# Patient Record
Sex: MALE | Race: WHITE | Employment: OTHER | RURAL
[De-identification: names, ages, dates, MRNs, and addresses within clinical notes are randomized per-mention and may not be internally consistent; named-entity substitution may affect disease eponyms.]

---

## 2017-08-24 ENCOUNTER — OFFICE VISIT (OUTPATIENT)
Dept: FAMILY MEDICINE CLINIC | Age: 82
End: 2017-08-24

## 2017-08-24 VITALS
TEMPERATURE: 96.1 F | SYSTOLIC BLOOD PRESSURE: 155 MMHG | HEART RATE: 54 BPM | BODY MASS INDEX: 27.49 KG/M2 | WEIGHT: 192 LBS | DIASTOLIC BLOOD PRESSURE: 64 MMHG | RESPIRATION RATE: 16 BRPM | HEIGHT: 70 IN | OXYGEN SATURATION: 96 %

## 2017-08-24 DIAGNOSIS — I10 ESSENTIAL HYPERTENSION: ICD-10-CM

## 2017-08-24 DIAGNOSIS — K57.30 DIVERTICULOSIS OF LARGE INTESTINE WITHOUT HEMORRHAGE: ICD-10-CM

## 2017-08-24 DIAGNOSIS — M62.838 MUSCLE SPASM: Primary | ICD-10-CM

## 2017-08-24 DIAGNOSIS — C61 PROSTATE CANCER (HCC): ICD-10-CM

## 2017-08-24 DIAGNOSIS — Z86.79 HISTORY OF ATRIAL FIBRILLATION: ICD-10-CM

## 2017-08-24 DIAGNOSIS — Z96.651 HISTORY OF TOTAL RIGHT KNEE REPLACEMENT: ICD-10-CM

## 2017-08-24 DIAGNOSIS — K80.20 GALLSTONES: ICD-10-CM

## 2017-08-24 RX ORDER — LANOLIN ALCOHOL/MO/W.PET/CERES
400 CREAM (GRAM) TOPICAL 2 TIMES DAILY
Qty: 100 TAB | Refills: 0
Start: 2017-08-24 | End: 2019-01-03 | Stop reason: ALTCHOICE

## 2017-08-24 RX ORDER — PNV NO.95/FERROUS FUM/FOLIC AC 28MG-0.8MG
TABLET ORAL
COMMUNITY
End: 2019-01-03 | Stop reason: ALTCHOICE

## 2017-08-24 NOTE — MR AVS SNAPSHOT
Visit Information Date & Time Provider Department Dept. Phone Encounter #  
 8/24/2017  1:20 PM Elise Interiano MD [facility] Primary [facility] [phone] [id number] Upcoming Health Maintenance Date Due DTaP/Tdap/Td series (1 - Tdap) 10/19/1947 ZOSTER VACCINE AGE 60> 8/19/1986 GLAUCOMA SCREENING Q2Y 10/19/1991 Pneumococcal 65+ High/Highest Risk (1 of 2 - PCV13) 10/19/1991 INFLUENZA AGE 9 TO ADULT 8/1/2017 MEDICARE YEARLY EXAM 11/2/2017 Allergies as of 8/24/2017  Review Complete On: 8/24/2017 By: Elise Interiano MD  
 No Known Allergies Current Immunizations  Never Reviewed No immunizations on file. Not reviewed this visit You Were Diagnosed With   
  
 Codes Comments Essential hypertension    -  Primary ICD-10-CM: I10 
ICD-9-CM: 401.9 Vitals BP Pulse Temp Resp Height(growth percentile) Weight(growth percentile) 155/64 (BP 1 Location: Left arm, BP Patient Position: Sitting) (!) 54 96.1 °F (35.6 °C) 16 5' 10\" (1.778 m) 192 lb (87.1 kg) SpO2 BMI Smoking Status 96% 27.55 kg/m2 Never Smoker Vitals History BMI and BSA Data Body Mass Index Body Surface Area  
 27.55 kg/m 2 2.07 m 2 Your Updated Medication List  
  
   
This list is accurate as of: 8/24/17  2:03 PM.  Always use your most recent med list.  
  
  
  
  
 CENTRUM SILVER MEN PO Take  by mouth. CITRACAL + D3 (CALCIUM PHOS) PO Take  by mouth. KRILL -80-03-50 mg Cap Generic drug:  krill-omega-3-dha-epa-lipids Take  by mouth.  
  
 metoprolol tartrate 50 mg tablet Commonly known as:  LOPRESSOR Take  by mouth two (2) times a day. Introducing [facility]! [facility] introduces [employer] patient portal. Now you can access parts of your medical record, email your doctor's office, and request medication refills online.    
 
1. In your internet browser, go to [email or web address] 2. Click on the First Time User? Click Here link in the Sign In box. You will see the New Member Sign Up page. 3. Enter your madKast Access Code exactly as it appears below. You will not need to use this code after youve completed the sign-up process. If you do not sign up before the expiration date, you must request a new code. · madKast Access Code: [email or web address] Expires: 11/22/2017  2:03 PM 
 
4. Enter the last four digits of your Social Security Number (xxxx) and Date of Birth (mm/dd/yyyy) as indicated and click Submit. You will be taken to the next sign-up page. 5. Create a MEPS Real-Timet ID. This will be your madKast login ID and cannot be changed, so think of one that is secure and easy to remember. 6. Create a madKast password. You can change your password at any time. 7. Enter your Password Reset Question and Answer. This can be used at a later time if you forget your password. 8. Enter your e-mail address. You will receive e-mail notification when new information is available in [address] 9. Click Sign Up. You can now view and download portions of your medical record. 10. Click the Download Summary menu link to download a portable copy of your medical information. If you have questions, please visit the Frequently Asked Questions section of the madKast website. Remember, madKast is NOT to be used for urgent needs. For medical emergencies, dial 911. Now available from your iPhone and Android! Please provide this summary of care documentation to your next provider. Your primary care clinician is listed as Florentin Archer. If you have any questions after today's visit, please call [phone].

## 2017-08-24 NOTE — PROGRESS NOTES
Jerry Brito is a 80 y.o. male who presents with the following:  Chief Complaint   Patient presents with    Hypertension    Rib Pain     both sides    Mass     Prostate       Hypertension    The history is provided by the patient (Patient has a long history of high blood pressure which is well controlled on his metoprolol which was originally given to him for atrial fibrillation). Associated symptoms include chest pain (Chest wall). Pertinent negatives include no orthopnea, no palpitations, no malaise/fatigue, no blurred vision, no headaches, no tinnitus, no dizziness, no shortness of breath, no nausea and no vomiting. Rib Pain    The history is provided by the patient (Patient having sharp stabbing pains in the left and right side of his chest wall on certain movements). Associated symptoms include back pain (Patient has mild to moderate back pain after surgeries on both his neck and his lower back years ago). Pertinent negatives include no abdominal pain, no claudication, no cough, no diaphoresis, no dizziness, no exertional chest pressure, no fever, no headaches, no lower extremity edema, no malaise/fatigue, no nausea, no numbness, no orthopnea, no palpitations, no shortness of breath, no vomiting and no weakness. Mass   Associated symptoms include chest pain (Chest wall). Pertinent negatives include no abdominal pain, no headaches and no shortness of breath. No Known Allergies    Current Outpatient Prescriptions   Medication Sig    MULTIVIT-MIN/FA/LYCOPEN/LUTEIN (CENTRUM SILVER MEN PO) Take  by mouth.  CALCIUM PHOSPHATE TRIB/VIT D3 (CITRACAL + D3, CALCIUM PHOS, PO) Take  by mouth.  krill-omega-3-dha-epa-lipids (KRILL OIL) 127-17-27-19 mg cap Take  by mouth.  magnesium oxide (MAG-OX) 400 mg tablet Take 1 Tab by mouth two (2) times a day.  metoprolol (LOPRESSOR) 50 mg tablet Take  by mouth two (2) times a day. No current facility-administered medications for this visit.         Past Medical History:   Diagnosis Date    HTN (hypertension)     Prostate cancer ([facility] [address]        Past Surgical History:   Procedure Laterality Date    HX ORTHOPAEDIC      HX UROLOGICAL         Family History   Problem Relation Age of Onset    Stroke Father     Cancer Sister        Social History     Social History    Marital status: SINGLE     Spouse name: N/A    Number of children: N/A    Years of education: N/A     Social History Main Topics    Smoking status: Never Smoker    Smokeless tobacco: Never Used    Alcohol use No    Drug use: None    Sexual activity: Not Asked     Other Topics Concern    None     Social History Narrative       Review of Systems   Constitutional: Negative for chills, diaphoresis, fever, malaise/fatigue and weight loss. HENT: Negative for congestion, hearing loss, sore throat and tinnitus. Eyes: Negative for blurred vision, pain and discharge. Respiratory: Negative for cough, shortness of breath and wheezing. Cardiovascular: Positive for chest pain (Chest wall). Negative for palpitations, orthopnea, claudication and leg swelling. Gastrointestinal: Negative for abdominal pain, constipation, heartburn, nausea and vomiting. Genitourinary: Negative for dysuria, frequency and urgency. Musculoskeletal: Positive for back pain (Patient has mild to moderate back pain after surgeries on both his neck and his lower back years ago). Negative for falls, joint pain and myalgias. Skin: Negative for itching and rash. Neurological: Negative for dizziness, tingling, tremors, weakness, numbness and headaches. Endo/Heme/Allergies: Negative for environmental allergies and polydipsia. Psychiatric/Behavioral: Negative for depression and substance abuse. The patient is not nervous/anxious.         Visit Vitals    /64 (BP 1 Location: Left arm, BP Patient Position: Sitting)    Pulse (!) 54    Temp 96.1 °F (35.6 °C)    Resp 16    Ht 5' 10\" (1.778 m)    Wt 192 lb (87.1 kg)  SpO2 96%    BMI 27.55 kg/m2     Physical Exam   Constitutional: He is oriented to person, place, and time and well-developed, well-nourished, and in no distress. HENT:   Head: Normocephalic and atraumatic. Nose: Nose normal.   Mouth/Throat: Oropharynx is clear and moist.   Eyes: Conjunctivae and EOM are normal. Pupils are equal, round, and reactive to light. Neck: Normal range of motion. Neck supple. No JVD present. No tracheal deviation present. No thyromegaly present. Cardiovascular: Normal rate, regular rhythm, normal heart sounds and intact distal pulses. Exam reveals no gallop and no friction rub. No murmur heard. Pulmonary/Chest: Effort normal and breath sounds normal. No respiratory distress. He has no wheezes. He has no rales. He exhibits no tenderness. Patient is tender in the intercostal spaces that correlate to where he was feeling the pain. Abdominal: Soft. Bowel sounds are normal. He exhibits no distension and no mass. There is no tenderness. There is no rebound and no guarding. Musculoskeletal: Normal range of motion. He exhibits no edema or tenderness. Lymphadenopathy:     He has no cervical adenopathy. Neurological: He is alert and oriented to person, place, and time. He has normal reflexes. No cranial nerve deficit. He exhibits normal muscle tone. Gait normal. Coordination normal.   Skin: Skin is warm and dry. No rash noted. No erythema. Psychiatric: Mood, memory, affect and judgment normal.   Vitals reviewed. ICD-10-CM ICD-9-CM    1. Muscle spasm A12.875 367.62 METABOLIC PANEL, COMPREHENSIVE      MAGNESIUM      magnesium oxide (MAG-OX) 400 mg tablet   2. Essential hypertension R26 565.8 METABOLIC PANEL, COMPREHENSIVE      CBC WITH AUTOMATED DIFF      MAGNESIUM      MA COLLECTION VENOUS BLOOD,VENIPUNCTURE   3. Prostate cancer ([facility] [address] C61 185    4. Diverticulosis of large intestine without hemorrhage K57.30 562.10    5.  History of total right knee replacement Z96.651 V43.65    6. Gallstones K80.20 574.20    7. History of atrial fibrillation Z86.79 V12.59        Orders Placed This Encounter    METABOLIC PANEL, COMPREHENSIVE    CBC WITH AUTOMATED DIFF    MAGNESIUM    CA COLLECTION VENOUS BLOOD,VENIPUNCTURE    MULTIVIT-MIN/FA/LYCOPEN/LUTEIN (CENTRUM SILVER MEN PO)     Sig: Take  by mouth.  CALCIUM PHOSPHATE TRIB/VIT D3 (CITRACAL + D3, CALCIUM PHOS, PO)     Sig: Take  by mouth.  krill-omega-3-dha-epa-lipids (KRILL OIL) 341-38-89-11 mg cap     Sig: Take  by mouth.  magnesium oxide (MAG-OX) 400 mg tablet     Sig: Take 1 Tab by mouth two (2) times a day.      Dispense:  100 Tab     Refill:  0       Follow-up Disposition: Not on Anoop Angel MD

## 2017-08-25 LAB
ALBUMIN SERPL-MCNC: 4.3 G/DL (ref 3.2–4.6)
ALBUMIN/GLOB SERPL: 2 {RATIO} (ref 1.2–2.2)
ALP SERPL-CCNC: 55 IU/L (ref 39–117)
ALT SERPL-CCNC: 13 IU/L (ref 0–44)
AST SERPL-CCNC: 20 IU/L (ref 0–40)
BASOPHILS # BLD AUTO: 0 X10E3/UL (ref 0–0.2)
BASOPHILS NFR BLD AUTO: 1 %
BILIRUB SERPL-MCNC: 0.4 MG/DL (ref 0–1.2)
BUN SERPL-MCNC: 9 MG/DL (ref 10–36)
BUN/CREAT SERPL: 10 (ref 10–24)
CALCIUM SERPL-MCNC: 9.2 MG/DL (ref 8.6–10.2)
CHLORIDE SERPL-SCNC: 95 MMOL/L (ref 96–106)
CO2 SERPL-SCNC: 28 MMOL/L (ref 18–29)
CREAT SERPL-MCNC: 0.93 MG/DL (ref 0.76–1.27)
EOSINOPHIL # BLD AUTO: 0.2 X10E3/UL (ref 0–0.4)
EOSINOPHIL NFR BLD AUTO: 3 %
ERYTHROCYTE [DISTWIDTH] IN BLOOD BY AUTOMATED COUNT: 13.7 % (ref 12.3–15.4)
GLOBULIN SER CALC-MCNC: 2.1 G/DL (ref 1.5–4.5)
GLUCOSE SERPL-MCNC: 85 MG/DL (ref 65–99)
HCT VFR BLD AUTO: 37.9 % (ref 37.5–51)
HGB BLD-MCNC: 12.8 G/DL (ref 12.6–17.7)
IMM GRANULOCYTES # BLD: 0 X10E3/UL (ref 0–0.1)
IMM GRANULOCYTES NFR BLD: 0 %
LYMPHOCYTES # BLD AUTO: 2 X10E3/UL (ref 0.7–3.1)
LYMPHOCYTES NFR BLD AUTO: 39 %
MAGNESIUM SERPL-MCNC: 2.2 MG/DL (ref 1.6–2.3)
MCH RBC QN AUTO: 30.1 PG (ref 26.6–33)
MCHC RBC AUTO-ENTMCNC: 33.8 G/DL (ref 31.5–35.7)
MCV RBC AUTO: 89 FL (ref 79–97)
MONOCYTES # BLD AUTO: 0.5 X10E3/UL (ref 0.1–0.9)
MONOCYTES NFR BLD AUTO: 11 %
NEUTROPHILS # BLD AUTO: 2.4 X10E3/UL (ref 1.4–7)
NEUTROPHILS NFR BLD AUTO: 46 %
PLATELET # BLD AUTO: 188 X10E3/UL (ref 150–379)
POTASSIUM SERPL-SCNC: 4.8 MMOL/L (ref 3.5–5.2)
PROT SERPL-MCNC: 6.4 G/DL (ref 6–8.5)
RBC # BLD AUTO: 4.25 X10E6/UL (ref 4.14–5.8)
SODIUM SERPL-SCNC: 137 MMOL/L (ref 134–144)
WBC # BLD AUTO: 5.1 X10E3/UL (ref 3.4–10.8)

## 2017-08-26 LAB
PSA SERPL-MCNC: 6 NG/ML (ref 0–4)
SPECIMEN STATUS REPORT, ROLRST: NORMAL

## 2017-08-28 ENCOUNTER — DOCUMENTATION ONLY (OUTPATIENT)
Dept: FAMILY MEDICINE CLINIC | Age: 82
End: 2017-08-28

## 2017-10-24 ENCOUNTER — OFFICE VISIT (OUTPATIENT)
Dept: SURGERY | Age: 82
End: 2017-10-24

## 2017-10-24 VITALS
HEART RATE: 59 BPM | BODY MASS INDEX: 27.01 KG/M2 | DIASTOLIC BLOOD PRESSURE: 78 MMHG | HEIGHT: 70 IN | SYSTOLIC BLOOD PRESSURE: 131 MMHG | WEIGHT: 188.7 LBS

## 2017-10-24 DIAGNOSIS — K59.01 SLOW TRANSIT CONSTIPATION: ICD-10-CM

## 2017-10-24 DIAGNOSIS — Z80.0 FAMILY HX OF COLON CANCER: ICD-10-CM

## 2017-10-24 DIAGNOSIS — K57.30 DIVERTICULOSIS OF LARGE INTESTINE WITHOUT HEMORRHAGE: Primary | ICD-10-CM

## 2017-10-29 NOTE — PROGRESS NOTES
ZULAY Ervin is a 80 y.o. male who self referred for a screening colonoscopy. It has been about 20 years since the last one and it was normal.  He has been having some trouble with his stools recently with some constipation. He does not take any meds for his bowels. He does not see any blood. He does have a younger sister who developed colon cancer in her [de-identified]. Spent over half of the 20 minute visit in discussion and coordination of care. IMP  We discussed options and given his age I do not feel he needs a scope. He has never had any polyps and aside for a sister with colon cancer he does not have any risks. PLAN  Do not recommend colonoscopy at this time.   Benefiber daily

## 2017-12-18 ENCOUNTER — TELEPHONE (OUTPATIENT)
Dept: FAMILY MEDICINE CLINIC | Age: 82
End: 2017-12-18

## 2017-12-18 ENCOUNTER — OFFICE VISIT (OUTPATIENT)
Dept: FAMILY MEDICINE CLINIC | Age: 82
End: 2017-12-18

## 2017-12-18 VITALS
WEIGHT: 190.4 LBS | DIASTOLIC BLOOD PRESSURE: 70 MMHG | OXYGEN SATURATION: 97 % | TEMPERATURE: 97.4 F | HEART RATE: 66 BPM | RESPIRATION RATE: 16 BRPM | BODY MASS INDEX: 28.2 KG/M2 | HEIGHT: 69 IN | SYSTOLIC BLOOD PRESSURE: 120 MMHG

## 2017-12-18 DIAGNOSIS — J40 BRONCHITIS: Primary | ICD-10-CM

## 2017-12-18 RX ORDER — PREDNISONE 10 MG/1
TABLET ORAL
Qty: 8 TAB | Refills: 0 | Status: SHIPPED | OUTPATIENT
Start: 2017-12-18 | End: 2017-12-21

## 2017-12-18 NOTE — MR AVS SNAPSHOT
Visit Information Date & Time Provider Department Dept. Phone Encounter #  
 12/18/2017  3:20 PM Samson Lemus MD [address] [place] [address] Avenue [phone] [id number] Follow-up Instructions Return if symptoms worsen or fail to improve. Upcoming Health Maintenance Date Due DTaP/Tdap/Td series (1 - Tdap) 10/19/1947 GLAUCOMA SCREENING Q2Y 10/19/1991 MEDICARE YEARLY EXAM 11/2/2017 Allergies as of 12/18/2017  Review Complete On: 12/18/2017 By: Samson Lemus MD  
 No Known Allergies Current Immunizations  Never Reviewed Name Date Influenza High Dose Vaccine PF 9/25/2017, 9/14/2016 Pneumococcal Conjugate (PCV-13) 9/14/2016, 6/15/2015 Pneumococcal Polysaccharide (PPSV-23) 12/1/2003 Zoster Vaccine, Live 12/8/2013 Not reviewed this visit You Were Diagnosed With   
  
 Codes Comments Bronchitis    -  Primary ICD-10-CM: L71 ICD-9-CM: 415 Vitals BP Pulse Temp Resp Height(growth percentile) Weight(growth percentile) 120/70 (BP 1 Location: Left arm, BP Patient Position: Sitting) 66 97.4 °F (36.3 °C) (Oral) 16 5' 9\" (1.753 m) 190 lb 6.4 oz (86.4 kg) SpO2 BMI Smoking Status 97% 28.12 kg/m2 Never Smoker Vitals History BMI and BSA Data Body Mass Index Body Surface Area  
 28.12 kg/m 2 2.05 m 2 Preferred Pharmacy Pharmacy Name Phone [facility] [place] [id number] [phone] Your Updated Medication List  
  
   
This list is accurate as of: 12/18/17  4:12 PM.  Always use your most recent med list.  
  
  
  
  
 CENTRUM SILVER MEN PO Take  by mouth. CITRACAL + D3 (CALCIUM PHOS) PO Take  by mouth. KRILL -12-27-50 mg Cap Generic drug:  krill-omega-3-dha-epa-lipids Take  by mouth.  
  
 magnesium oxide 400 mg tablet Commonly known as:  MAG-OX Take 1 Tab by mouth two (2) times a day. metoprolol tartrate 50 mg tablet Commonly known as:  LOPRESSOR Take  by mouth two (2) times a day. predniSONE 10 mg tablet Commonly known as:  Krzysztof Dally Take 2 tabs twice daily for 1 days then 1 tab twice daily for 2 days then one tab daily for 2 days PROBIOTIC 4X 10-15 mg Tbec Generic drug:  B.infantis-B.ani-B.long-B.bifi Take  by mouth. Prescriptions Sent to Pharmacy Refills  
 predniSONE (DELTASONE) 10 mg tablet 0 Sig: Take 2 tabs twice daily for 1 days then 1 tab twice daily for 2 days then one tab daily for 2 days Class: Normal  
 Pharmacy: [address] Ph #: [phone] Follow-up Instructions Return if symptoms worsen or fail to improve. To-Do List   
 12/25/2017 Imaging:  XR CHEST PA LAT Patient Instructions Home, rest, lots of fluids, vaporizer if needed. Prednisone taper 
mucenex DM if needed. Xray Follow up if worse or not better next 3-5 days. Introducing \[facility]\"" & HEALTH SERVICES! Romayne Duster introduces [employer] patient portal. Now you can access parts of your medical record, email your doctor's office, and request medication refills online. 1. In your internet browser, go to [email or web address] 2. Click on the First Time User? Click Here link in the Sign In box. You will see the New Member Sign Up page. 3. Enter your [employer]itus Access Code exactly as it appears below. You will not need to use this code after youve completed the sign-up process. If you do not sign up before the expiration date, you must request a new code. · [employer] Access Code: [email or web address] Expires: 3/18/2018  4:08 PM 
 
4. Enter the last four digits of your Social Security Number (xxxx) and Date of Birth (mm/dd/yyyy) as indicated and click Submit. You will be taken to the next sign-up page. 5. Create a [employer]itus ID.  This will be your [employer] login ID and cannot be changed, so think of one that is secure and easy to remember. 6. Create a Tealet password. You can change your password at any time. 7. Enter your Password Reset Question and Answer. This can be used at a later time if you forget your password. 8. Enter your e-mail address. You will receive e-mail notification when new information is available in [address] 9. Click Sign Up. You can now view and download portions of your medical record. 10. Click the Download Summary menu link to download a portable copy of your medical information. If you have questions, please visit the Frequently Asked Questions section of the [employer] website. Remember, [employer] is NOT to be used for urgent needs. For medical emergencies, dial 911. Now available from your iPhone and Android! Please provide this summary of care documentation to your next provider. Your primary care clinician is listed as Linda Jordan. If you have any questions after today's visit, please call [phone].

## 2017-12-18 NOTE — PATIENT INSTRUCTIONS
Home, rest, lots of fluids, vaporizer if needed. Prednisone taper  mucenex DM if needed. Xray  Follow up if worse or not better next 3-5 days.

## 2017-12-18 NOTE — PROGRESS NOTES
Fredy Aguilera is a 80 y.o. male who presents to the office today with the following:  Chief Complaint   Patient presents with    Cough     chest congestion       No Known Allergies    Current Outpatient Prescriptions   Medication Sig    B.infantis-B.ani-B.long-B.bifi (PROBIOTIC 4X) 10-15 mg TbEC Take  by mouth.  predniSONE (DELTASONE) 10 mg tablet Take 2 tabs twice daily for 1 days then 1 tab twice daily for 2 days then one tab daily for 2 days    MULTIVIT-MIN/FA/LYCOPEN/LUTEIN (CENTRUM SILVER MEN PO) Take  by mouth.  CALCIUM PHOSPHATE TRIB/VIT D3 (CITRACAL + D3, CALCIUM PHOS, PO) Take  by mouth.  krill-omega-3-dha-epa-lipids (KRILL OIL) 139-78-82-26 mg cap Take  by mouth.  magnesium oxide (MAG-OX) 400 mg tablet Take 1 Tab by mouth two (2) times a day.  metoprolol (LOPRESSOR) 50 mg tablet Take  by mouth two (2) times a day. No current facility-administered medications for this visit. Past Medical History:   Diagnosis Date    HTN (hypertension)     Prostate cancer ([facility] [address]        Past Surgical History:   Procedure Laterality Date    HX ORTHOPAEDIC      HX UROLOGICAL         History   Smoking Status    Never Smoker   Smokeless Tobacco    Never Used       Family History   Problem Relation Age of Onset    Stroke Father     Cancer Sister      colon         History of Present Illness:  Patient here for evaluation URI complaints    Patient complaining of URI symptoms since a week ago Tuesday. He was on a cruise. Began to develop a sore throat. Then he developed nasal congestion and a cough. He on his own took a course of Zithromax that he had with him. He finished this yesterday. He feels a bit better. He states throat is no longer sore. He may be coughing a bit less but still has a lot of coughing with clear phlegm. He also has a runny nose. No fever no chills no chest pain no shortness of breath. No history of COPD.   No history of diabetes    He did have a flu shot earlier this year      Review of Systems:    Review of systems negative except as noted above      Physical Exam:  Visit Vitals    /70 (BP 1 Location: Left arm, BP Patient Position: Sitting)    Pulse 66    Temp 97.4 °F (36.3 °C) (Oral)    Resp 16    Ht 5' 9\" (1.753 m)    Wt 190 lb 6.4 oz (86.4 kg)    SpO2 97%    BMI 28.12 kg/m2     Vitals:    12/18/17 1507   BP: 120/70   BP 1 Location: Left arm   BP Patient Position: Sitting   Pulse: 66   Resp: 16   Temp: 97.4 °F (36.3 °C)   TempSrc: Oral   SpO2: 97%   Weight: 190 lb 6.4 oz (86.4 kg)   Height: 5' 9\" (1.753 m)     Patient no acute distress vitals as above. Afebrile. O2 sat excellent on room air  Head was normocephalic  External ears were normal.  Ear canals normal.  TMs were clear  Nose external nose normal.  No lesions. Minimal clear congestion      OP Mucosa normal.  Pharynx normal.  No erythema or exudate. Structures midline  Neck no nodes no masses  Chest had some coarse expiratory breath sounds patient with no wheezes rhonchi or rales. Good air exchange  Cor regular rate and rhythm no murmurs    1. Bronchitis  Patient with URI/bronchitis. Just finished a course of Zithromax. Feeling some better at this point but with persistent cough and congestion. No additional antibiotics indicated at this point. I am going to place him on a course of prednisone. I am checking a chest x-ray. He will call me if not continuing to feel better this week    - XR CHEST PA LAT; Future        Continue current therapy plan except for indicated above. Verbal and written instructions (see AVS) provided.  Patient expresses understanding of diagnosis and treatment plan. Follow-up Disposition:  Return if symptoms worsen or fail to improve. Tara Swanson.  Brandy Montague MD

## 2017-12-18 NOTE — TELEPHONE ENCOUNTER
Wife calling to get an appointment for her  and to make sure he does not need to go to ER. Nurse advised that if he has breathing difficulty or problems swallowing go to ER. He is a [facility] patient but they do not have any openings andtheir office asked them to try and get an appointment at the [facility] office.

## 2017-12-21 ENCOUNTER — TELEPHONE (OUTPATIENT)
Dept: FAMILY MEDICINE CLINIC | Age: 82
End: 2017-12-21

## 2017-12-21 ENCOUNTER — OFFICE VISIT (OUTPATIENT)
Dept: FAMILY MEDICINE CLINIC | Age: 82
End: 2017-12-21

## 2017-12-21 VITALS
TEMPERATURE: 97.4 F | HEIGHT: 69 IN | RESPIRATION RATE: 20 BRPM | OXYGEN SATURATION: 97 % | BODY MASS INDEX: 27.4 KG/M2 | HEART RATE: 61 BPM | SYSTOLIC BLOOD PRESSURE: 112 MMHG | DIASTOLIC BLOOD PRESSURE: 70 MMHG | WEIGHT: 185 LBS

## 2017-12-21 DIAGNOSIS — Z00.00 ENCOUNTER FOR MEDICARE ANNUAL WELLNESS EXAM: ICD-10-CM

## 2017-12-21 DIAGNOSIS — I10 ESSENTIAL HYPERTENSION: ICD-10-CM

## 2017-12-21 DIAGNOSIS — J40 BRONCHITIS: Primary | ICD-10-CM

## 2017-12-21 RX ORDER — BROMPHENIRAMINE MALEATE, PSEUDOEPHEDRINE HYDROCHLORIDE, AND DEXTROMETHORPHAN HYDROBROMIDE 2; 30; 10 MG/5ML; MG/5ML; MG/5ML
5 SYRUP ORAL
Qty: 118 BOTTLE | Refills: 1 | Status: SHIPPED | OUTPATIENT
Start: 2017-12-21 | End: 2018-08-03

## 2017-12-21 RX ORDER — ALBUTEROL SULFATE 90 UG/1
2 AEROSOL, METERED RESPIRATORY (INHALATION)
Qty: 1 INHALER | Refills: 5 | Status: SHIPPED | OUTPATIENT
Start: 2017-12-21 | End: 2018-01-20

## 2017-12-21 RX ORDER — ALBUTEROL SULFATE 0.83 MG/ML
2.5 SOLUTION RESPIRATORY (INHALATION) ONCE
Qty: 1 EACH | Refills: 0
Start: 2017-12-21 | End: 2017-12-21

## 2017-12-21 NOTE — TELEPHONE ENCOUNTER
Pt wife calls, says pt was seen 12/18 for cough, cold s/s. Pt refuses to take prednisone d/t SE; wife says pt is not better, maybe even worse. Pt coughing, unable to sleep, and exhausted. Advised pt wife to take pt to urgent care or ER if s/s worsen, and/or await a call back from our office with guidance today, as we have no open appts.  Please advise

## 2017-12-21 NOTE — MR AVS SNAPSHOT
Visit Information Date & Time Provider Department Dept. Phone Encounter #  
 12/21/2017  2:00 PM Ulices Up, BE[place] [id number] [facility] [phone] [id number] Upcoming Health Maintenance Date Due  
 GLAUCOMA SCREENING Q2Y 10/19/1991 MEDICARE YEARLY EXAM 12/22/2018 DTaP/Tdap/Td series (2 - Td) 12/21/2027 Allergies as of 12/21/2017  Review Complete On: 12/21/2017 By: Africa Horn RN Severity Noted Reaction Type Reactions Prednisone  12/21/2017    Other (comments) Current Immunizations  Never Reviewed Name Date Influenza High Dose Vaccine PF 9/25/2017, 9/14/2016 Pneumococcal Conjugate (PCV-13) 9/14/2016, 6/15/2015 Pneumococcal Polysaccharide (PPSV-23) 12/1/2003 Zoster Vaccine, Live 12/8/2013 Not reviewed this visit You Were Diagnosed With   
  
 Codes Comments Bronchitis    -  Primary ICD-10-CM: L44 ICD-9-CM: 489 Vitals BP Pulse Temp Resp Height(growth percentile) 112/70 (BP 1 Location: Left arm, BP Patient Position: Sitting) 61 97.4 °F (36.3 °C) (Temporal) 20 5' 9\" (1.753 m) Weight(growth percentile) SpO2 BMI Smoking Status 185 lb (83.9 kg) 97% 27.32 kg/m2 Never Smoker Vitals History BMI and BSA Data Body Mass Index Body Surface Area  
 27.32 kg/m 2 2.02 m 2 Preferred Pharmacy Pharmacy Name Phone [facility] [place] [address] [phone] Your Updated Medication List  
  
   
This list is accurate as of: 12/21/17  3:31 PM.  Always use your most recent med list.  
  
  
  
  
 * albuterol 2.5 mg /3 mL (0.083 %) nebulizer solution Commonly known as:  PROVENTIL VENTOLIN  
3 mL by Nebulization route once for 1 dose. * albuterol 90 mcg/actuation inhaler Commonly known as:  PROVENTIL HFA, VENTOLIN HFA, PROAIR HFA Take 2 Puffs by inhalation every four (4) hours as needed for Wheezing for up to 30 days. Indications: BRONCHOSPASM PREVENTION Brompheniramine-Pseudoeph-DM 2-30-10 mg/5 mL syrup Commonly known as:  BROMFED DM Take 5 mL by mouth four (4) times daily as needed. Indications: COLD SYMPTOMS CENTRUM SILVER MEN PO Take  by mouth. CITRACAL + D3 (CALCIUM PHOS) PO Take  by mouth. KRILL -31-50-50 mg Cap Generic drug:  krill-omega-3-dha-epa-lipids Take  by mouth.  
  
 magnesium oxide 400 mg tablet Commonly known as:  MAG-OX Take 1 Tab by mouth two (2) times a day. metoprolol tartrate 50 mg tablet Commonly known as:  LOPRESSOR Take  by mouth two (2) times a day. PROBIOTIC 4X 10-15 mg Tbec Generic drug:  B.infantis-B.ani-B.long-B.bifi Take  by mouth. * Notice: This list has 2 medication(s) that are the same as other medications prescribed for you. Read the directions carefully, and ask your doctor or other care provider to review them with you. Prescriptions Sent to Pharmacy Refills Brompheniramine-Pseudoeph-DM (BROMFED DM) 2-30-10 mg/5 mL syrup 1 Sig: Take 5 mL by mouth four (4) times daily as needed. Indications: COLD SYMPTOMS Class: Normal  
 Pharmacy: [place] [address] [address] [place] [address] [employer] Ph #: [phone] Route: Oral  
 albuterol (PROVENTIL HFA, VENTOLIN HFA, PROAIR HFA) 90 mcg/actuation inhaler 5 Sig: Take 2 Puffs by inhalation every four (4) hours as needed for Wheezing for up to 30 days. Indications: BRONCHOSPASM PREVENTION Class: Normal  
 Pharmacy: [facility] [address] [place] [address] [address] [facility] Ph #: [phone] Route: Inhalation We Performed the Following ALBUTEROL, INHAL. SOL., FDA-APPROVED FINAL, NON-COMPOUND UNIT DOSE, 1 MG [ [facility]] Introducing [facility]!    
 [employer] introduces MessageCast patient portal. Now you can access parts of your medical record, email your doctor's office, and request medication refills online. 1. In your internet browser, go to [email or web address] 2. Click on the First Time User? Click Here link in the Sign In box. You will see the New Member Sign Up page. 3. Enter your Intermolecular Access Code exactly as it appears below. You will not need to use this code after youve completed the sign-up process. If you do not sign up before the expiration date, you must request a new code. · Intermolecular Access Code: [email or web address] Expires: 3/18/2018  4:08 PM 
 
4. Enter the last four digits of your Social Security Number (xxxx) and Date of Birth (mm/dd/yyyy) as indicated and click Submit. You will be taken to the next sign-up page. 5. Create a Intermolecular ID. This will be your Intermolecular login ID and cannot be changed, so think of one that is secure and easy to remember. 6. Create a Intermolecular password. You can change your password at any time. 7. Enter your Password Reset Question and Answer. This can be used at a later time if you forget your password. 8. Enter your e-mail address. You will receive e-mail notification when new information is available in [address] 9. Click Sign Up. You can now view and download portions of your medical record. 10. Click the Download Summary menu link to download a portable copy of your medical information. If you have questions, please visit the Frequently Asked Questions section of the Intermolecular website. Remember, Intermolecular is NOT to be used for urgent needs. For medical emergencies, dial 911. Now available from your iPhone and Android! Please provide this summary of care documentation to your next provider. Your primary care clinician is listed as Colleen Musa. If you have any questions after today's visit, please call [phone].

## 2017-12-21 NOTE — TELEPHONE ENCOUNTER
Spoke w/Casandra at [facility] office; they will see pt today at 2pm; pt notified

## 2017-12-24 NOTE — PROGRESS NOTES
Subjective:     Lena De La Cruz is a 80 y.o. male who presents today with the following:  Chief Complaint   Patient presents with    Annual Wellness Visit    Cough     Patient complaining of URI symptoms for two weeks. He was on a cruise. Began to develop a sore throat. Then he developed nasal congestion and a cough. He on his own took a course of Zithromax that he had with him. Seen by Dr. America Miranda shortly after finishing the Z pack. Since that visit he developed chest congestion with persistent cough. No fever no chills no chest pain no shortness of breath. No history of COPD. No history of diabetes    COMPLIANT WITH MEDICATION:   HTN; Denies chest pain, dyspnea, palpitations, headache and blurred vision. Blood pressure normotensive. ROS:  Gen: denies fever, chills, fatigue, weight loss, weight gain  HEENT:denies blurry vision, nasal congestion, sore throat  Resp: denies dypsnea, cough, wheezing  CV: denies chest pain radiating to the jaws or arms, palpitations, lower extremity edema  Abd: denies nausea, vomiting, diarrhea, constipation  Neuro: denies numbness/tingling  Endo: denies polyuria, polydipsia, heat/cold intolerance  Heme: no lymphadenopathy    Allergies   Allergen Reactions    Prednisone Other (comments)         Current Outpatient Prescriptions:     Brompheniramine-Pseudoeph-DM (BROMFED DM) 2-30-10 mg/5 mL syrup, Take 5 mL by mouth four (4) times daily as needed. Indications: COLD SYMPTOMS, Disp: 118 Bottle, Rfl: 1    albuterol (PROVENTIL HFA, VENTOLIN HFA, PROAIR HFA) 90 mcg/actuation inhaler, Take 2 Puffs by inhalation every four (4) hours as needed for Wheezing for up to 30 days.  Indications: BRONCHOSPASM PREVENTION, Disp: 1 Inhaler, Rfl: 5    B.infantis-B.ani-B.long-B.bifi (PROBIOTIC 4X) 10-15 mg TbEC, Take  by mouth., Disp: , Rfl:     MULTIVIT-MIN/FA/LYCOPEN/LUTEIN (CENTRUM SILVER MEN PO), Take  by mouth., Disp: , Rfl:     CALCIUM PHOSPHATE TRIB/VIT D3 (CITRACAL + D3, CALCIUM PHOS, PO), Take  by mouth., Disp: , Rfl:     krill-omega-3-dha-epa-lipids (KRILL OIL) 192-45-80-50 mg cap, Take  by mouth., Disp: , Rfl:     metoprolol (LOPRESSOR) 50 mg tablet, Take  by mouth two (2) times a day., Disp: , Rfl:     predniSONE (STERAPRED) 5 mg dose pack, See administration instruction per 5mg dose pack, Disp: 21 Tab, Rfl: 0    magnesium oxide (MAG-OX) 400 mg tablet, Take 1 Tab by mouth two (2) times a day., Disp: 100 Tab, Rfl: 0    Past Medical History:   Diagnosis Date    HTN (hypertension)     Prostate cancer ([facility] [address]        Past Surgical History:   Procedure Laterality Date    HX ORTHOPAEDIC      HX UROLOGICAL         History   Smoking Status    Never Smoker   Smokeless Tobacco    Never Used       Social History     Social History    Marital status: SINGLE     Spouse name: N/A    Number of children: N/A    Years of education: N/A     Social History Main Topics    Smoking status: Never Smoker    Smokeless tobacco: Never Used    Alcohol use No    Drug use: None    Sexual activity: Not Asked     Other Topics Concern     Service Yes    Blood Transfusions No    Caffeine Concern No    Occupational Exposure No    Hobby Hazards No    Sleep Concern No    Stress Concern No    Weight Concern No    Special Diet No    Back Care No    Exercise No    Bike Helmet No    Seat Belt Yes    Self-Exams Yes     Social History Narrative       Family History   Problem Relation Age of Onset    Stroke Father     Cancer Sister      colon         Objective:     Visit Vitals    /70 (BP 1 Location: Left arm, BP Patient Position: Sitting)    Pulse 61    Temp 97.4 °F (36.3 °C) (Temporal)    Resp 20    Ht 5' 9\" (1.753 m)    Wt 185 lb (83.9 kg)    SpO2 97%    BMI 27.32 kg/m2     Body mass index is 27.32 kg/(m^2). General: Alert and oriented. No acute distress. Well nourished  HEENT :  Ears:TMs are normal. Canals are clear.    Eyes: pupils equal, round, react to light and accommodation. Extra ocular movements intact. Nose: patent. Mouth and throat is clear. Neck:supple full range of motion no thyromegaly. Trachea midline, No carotid bruits. No significant lymphadenopathy  Lungs[de-identified] diffuse  wheezes, and rales on auscultation condition improved post nebulizer tx.negative rales, or rhonchi.mild exp wheezing. Heart :RRR, S1 & S2 are normal intensity. No murmur; no gallop  Abdomen: bowel sounds active. No tenderness, guarding, rebound, masses, hepatic or spleen enlargement  Back: no CVA tenderness. Extremities: without clubbing, cyanosis, or edema  Pulses: radial and femoral pulses are normal  Neuro: HMF intact. Cranial nerves II through XII grossly normal.  Motor: is 5 over 5 and symmetrical.   Deep tendon reflexes: +2 equal    Results for orders placed or performed during the hospital encounter of 12/19/17   PSA, DIAGNOSTIC (PROSTATE SPECIFIC AG)   Result Value Ref Range    Prostate Specific Ag 6.5 (H) 0.0 - 4.0 ng/mL       No results found for this visit on 12/21/17. Assessment/ Plan:     Diagnoses and all orders for this visit:    1. Bronchitis  -     ALBUTEROL, INHAL. [facility].()    Other orders  -     Brompheniramine-Pseudoeph-DM (BROMFED DM) 2-30-10 mg/5 mL syrup; Take 5 mL by mouth four (4) times daily as needed. Indications: COLD SYMPTOMS  -     albuterol (PROVENTIL VENTOLIN) 2.5 mg /3 mL (0.083 %) nebulizer solution; 3 mL by Nebulization route once for 1 dose. -     albuterol (PROVENTIL HFA, VENTOLIN HFA, PROAIR HFA) 90 mcg/actuation inhaler; Take 2 Puffs by inhalation every four (4) hours as needed for Wheezing for up to 30 days. Indications: BRONCHOSPASM PREVENTION         1.  Bronchitis        Orders Placed This Encounter    ALBUTEROL, INHAL. [facility].()     Order Specific Question:   Dose     Answer:   1mg     Order Specific Question:   Site     Answer:   OTHER     Order Specific Question:   Expiration Date     Answer:   9/30/2018     Order Specific Question:   Lot# Answer:   [id number]     Order Specific Question:        Answer:   nephron     Order Specific Question:   Charge Quantity? Answer:   1     Order Specific Question:   Perfomed by/Witnessed by: Answer:   [email or web address]    Brompheniramine-Pseudoeph-DM (BROMFED DM) 2-30-10 mg/5 mL syrup     Sig: Take 5 mL by mouth four (4) times daily as needed. Indications: COLD SYMPTOMS     Dispense:  118 Bottle     Refill:  1    albuterol (PROVENTIL VENTOLIN) 2.5 mg /3 mL (0.083 %) nebulizer solution     Sig: 3 mL by Nebulization route once for 1 dose. Dispense:  1 Each     Refill:  0    albuterol (PROVENTIL HFA, VENTOLIN HFA, PROAIR HFA) 90 mcg/actuation inhaler     Sig: Take 2 Puffs by inhalation every four (4) hours as needed for Wheezing for up to 30 days. Indications: BRONCHOSPASM PREVENTION     Dispense:  1 Inhaler     Refill:  5         Verbal and written instructions (see AVS) provided.  Patient expresses understanding of diagnosis and treatment plan. Follow-up Disposition: Not on File      RALEIGH Posey        Chirag Torres is a 80 y.o. male and presents for annual Medicare Wellness Visit. Problem List: Reviewed with patient and discussed risk factors.     Patient Active Problem List   Diagnosis Code    Hypertension I10    Prostate cancer ([facility] [address] C61    Muscle spasm M62.838    Diverticulosis of large intestine without hemorrhage K57.30    History of total right knee replacement Z96.651    Gallstones K80.20    History of atrial fibrillation Z86.79       Current medical providers:  Patient Care Team:  Rosangela Gray MD as PCP - General (Family Practice)  Laine Ontiveros MD (Surgery)    PSH: Reviewed with patient  Past Surgical History:   Procedure Laterality Date    HX ORTHOPAEDIC      HX UROLOGICAL          SH: Reviewed with patient  Social History   Substance Use Topics    Smoking status: Never Smoker    Smokeless tobacco: Never Used    Alcohol use No       FH: Reviewed with patient  Family History   Problem Relation Age of Onset    Stroke Father    [address] [facility] Cancer Sister      colon       Medications/Allergies: Reviewed with patient  Current Outpatient Prescriptions on File Prior to Visit   Medication Sig Dispense Refill    B.infantis-B.ani-B.long-B.bifi (PROBIOTIC 4X) 10-15 mg TbEC Take  by mouth.  MULTIVIT-MIN/FA/LYCOPEN/LUTEIN (CENTRUM SILVER MEN PO) Take  by mouth.  CALCIUM PHOSPHATE TRIB/VIT D3 (CITRACAL + D3, CALCIUM PHOS, PO) Take  by mouth.  krill-omega-3-dha-epa-lipids (KRILL OIL) 499-05-56-33 mg cap Take  by mouth.  metoprolol (LOPRESSOR) 50 mg tablet Take  by mouth two (2) times a day.  magnesium oxide (MAG-OX) 400 mg tablet Take 1 Tab by mouth two (2) times a day. 100 Tab 0     No current facility-administered medications on file prior to visit. Allergies   Allergen Reactions    Prednisone Other (comments)       Objective:  Visit Vitals    /70 (BP 1 Location: Left arm, BP Patient Position: Sitting)    Pulse 61    Temp 97.4 °F (36.3 °C) (Temporal)    Resp 20    Ht 5' 9\" (1.753 m)    Wt 185 lb (83.9 kg)    SpO2 97%    BMI 27.32 kg/m2    Body mass index is 27.32 kg/(m^2). Assessment of cognitive impairment: Alert and oriented x 3    Depression Screen:   PHQ over the last two weeks 12/21/2017   Little interest or pleasure in doing things Not at all   Feeling down, depressed or hopeless Not at all   Total Score PHQ 2 0       Fall Risk Assessment:    Fall Risk Assessment, last 12 mths 12/21/2017   Able to walk? Yes   Fall in past 12 months? No       Functional Ability:   Does the patient exhibit a steady gait? yes   How long did it take the patient to get up and walk from a sitting position? 3 seconds   Is the patient self reliant?  (ie can do own laundry, meals, household chores)  yes     Does the patient handle his/her own medications? yes     Does the patient handle his/her own money?    yes     Is the patients home safe (ie good lighting, handrails on stairs and bath, etc.)? yes     Did you notice or did patient express any hearing difficulties? no     Did you notice or did patient express any vision difficulties?   no     Were distance and reading eye charts used? no       Advance Care Planning:   Patient was offered the opportunity to discuss advance care planning:  yes     Does patient have an Advance Directive:  no   If no, did you provide information on Caring Connections? yes       Plan:      Orders Placed This Encounter    ALBUTEROL, INHAL. hospitals.()    Brompheniramine-Pseudoeph-DM (BROMFED DM) 2-30-10 mg/5 mL syrup    albuterol (PROVENTIL VENTOLIN) 2.5 mg /3 mL (0.083 %) nebulizer solution    albuterol (PROVENTIL HFA, VENTOLIN HFA, PROAIR HFA) 90 mcg/actuation inhaler       Health Maintenance   Topic Date Due    GLAUCOMA SCREENING Q2Y  10/19/1991    MEDICARE YEARLY EXAM  12/22/2018    DTaP/Tdap/Td series (2 - Td) 12/21/2027    ZOSTER VACCINE AGE 60>  Completed    Pneumococcal 65+ High/Highest Risk  Completed    Influenza Age 5 to Adult  Completed       *Patient verbalized understanding and agreement with the plan. A copy of the After Visit Summary with personalized health plan was given to the patient today.       Follow-up Disposition: Not on File        ISAURO Mcallister

## 2018-02-14 ENCOUNTER — OFFICE VISIT (OUTPATIENT)
Dept: FAMILY MEDICINE CLINIC | Age: 83
End: 2018-02-14

## 2018-02-14 VITALS
HEART RATE: 58 BPM | BODY MASS INDEX: 28.29 KG/M2 | RESPIRATION RATE: 18 BRPM | DIASTOLIC BLOOD PRESSURE: 75 MMHG | SYSTOLIC BLOOD PRESSURE: 152 MMHG | TEMPERATURE: 97.4 F | WEIGHT: 191 LBS | HEIGHT: 69 IN | OXYGEN SATURATION: 98 %

## 2018-02-14 DIAGNOSIS — R19.4 CHANGE IN BOWEL HABITS: Primary | ICD-10-CM

## 2018-02-14 NOTE — MR AVS SNAPSHOT
[address] [place] [address] [phone] Patient: Donnette Sandhoff MRN: [id number] :10/19/1926 Visit Information Date & Time Provider Department Dept. Phone Encounter #  
 2018  9:20 AM Symone Olivarez MD [address] [id number] Upcoming Health Maintenance Date Due  
 GLAUCOMA SCREENING Q2Y 10/19/1991 MEDICARE YEARLY EXAM 2018 DTaP/Tdap/Td series (2 - Td) 2027 Allergies as of 2018  Review Complete On: 2018 By: Symone Olivarez MD  
  
 Severity Noted Reaction Type Reactions Prednisone  2017    Other (comments) Current Immunizations  Never Reviewed Name Date Influenza High Dose Vaccine PF 2017, 2016 Pneumococcal Conjugate (PCV-13) 2016, 6/15/2015 Pneumococcal Polysaccharide (PPSV-23) 2003 Zoster Vaccine, Live 2013 Not reviewed this visit You Were Diagnosed With   
  
 Codes Comments Change in bowel habits    -  Primary ICD-10-CM: R19.4 ICD-9-CM: 787.99 Vitals BP Pulse Temp Resp Height(growth percentile) Weight(growth percentile) 152/75 (BP 1 Location: Left arm, BP Patient Position: Sitting) (!) 58 97.4 °F (36.3 °C) (Oral) 18 5' 9\" (1.753 m) 191 lb (86.6 kg) SpO2 BMI Smoking Status 98% 28.21 kg/m2 Never Smoker Vitals History BMI and BSA Data Body Mass Index Body Surface Area  
 28.21 kg/m 2 2.05 m 2 Preferred Pharmacy Pharmacy Name Phone [address] [place] [phone] Your Updated Medication List  
  
   
This list is accurate as of: 18 11:07 AM.  Always use your most recent med list.  
  
  
  
  
 Brompheniramine-Pseudoeph-DM 2-30-10 mg/5 mL syrup Commonly known as:  BROMFED DM Take 5 mL by mouth four (4) times daily as needed.  Indications: COLD SYMPTOMS CENTRUM SILVER MEN PO Take  by mouth. CITRACAL + D3 (CALCIUM PHOS) PO Take  by mouth. KRILL -70-70-50 mg Cap Generic drug:  krill-omega-3-dha-epa-lipids Take  by mouth.  
  
 magnesium oxide 400 mg tablet Commonly known as:  MAG-OX Take 1 Tab by mouth two (2) times a day. metoprolol tartrate 50 mg tablet Commonly known as:  LOPRESSOR Take  by mouth two (2) times a day. predniSONE 5 mg dose pack Commonly known as:  STERAPRED See administration instruction per 5mg dose pack PROBIOTIC 4X 10-15 mg Tbec Generic drug:  B.infantis-B.ani-B.long-B.bifi Take  by mouth. We Performed the Following REFERRAL TO GASTROENTEROLOGY [NOX10 Custom] Referral Information Referral ID Referred By Referred To  
  
 [id number] [facility] Phone: [phone] Fax: [phone] Visits Status Start Date End Date 1 New Request 2/14/18 2/14/19 If your referral has a status of pending review or denied, additional information will be sent to support the outcome of this decision. Introducing [facility] & [facility]! [employer] introduces [employer] Toolkit patient portal. Now you can access parts of your medical record, email your doctor's office, and request medication refills online. 1. In your internet browser, go to [email or web address] 2. Click on the First Time User? Click Here link in the Sign In box. You will see the New Member Sign Up page. 3. Enter your Money Toolkit Access Code exactly as it appears below. You will not need to use this code after youve completed the sign-up process. If you do not sign up before the expiration date, you must request a new code. · Money Toolkit Access Code: [email or web address] Expires: 3/18/2018  4:08 PM 
 
4.  Enter the last four digits of your Social Security Number (xxxx) and Date of Birth (mm/dd/yyyy) as indicated and click Submit. You will be taken to the next sign-up page. 5. Create a Bonfaire ID. This will be your Bonfaire login ID and cannot be changed, so think of one that is secure and easy to remember. 6. Create a Bonfaire password. You can change your password at any time. 7. Enter your Password Reset Question and Answer. This can be used at a later time if you forget your password. 8. Enter your e-mail address. You will receive e-mail notification when new information is available in [address] 9. Click Sign Up. You can now view and download portions of your medical record. 10. Click the Download Summary menu link to download a portable copy of your medical information. If you have questions, please visit the Frequently Asked Questions section of the Bonfaire website. Remember, Bonfaire is NOT to be used for urgent needs. For medical emergencies, dial 911. Now available from your iPhone and Android! Please provide this summary of care documentation to your next provider. Your primary care clinician is listed as Heron Alva. If you have any questions after today's visit, please call [phone].

## 2018-02-14 NOTE — PROGRESS NOTES
Chey Stone is a 80 y.o. male who presents with the following:  Chief Complaint   Patient presents with    Diarrhea     loose stools       Diarrhea    The history is provided by the patient (Patient states that his bowel movements have been erratic for the past 3 months and is been going 2-3 times a day with no blood or cramping and instead of being formed and normal size the stool is gotten looser and even as little as 1 glass of water can ca). This is a chronic problem. The current episode started more than 1 week ago. The problem occurs 2 to 4 times per day. The problem has been gradually worsening. There has been no fever. The stool consistency is described as watery. Pertinent negatives include no abdominal pain, no vomiting, no chills, no sweats, no headaches, no arthralgias, no myalgias, no URI, no cough, no anal bleeding and no back pain. Risk factors: Patient's sister  from colon cancer and he is concerned about the erratic bowel movements and the fact they are getting worse and not better. Allergies   Allergen Reactions    Prednisone Other (comments)       Current Outpatient Prescriptions   Medication Sig    predniSONE (STERAPRED) 5 mg dose pack See administration instruction per 5mg dose pack    Brompheniramine-Pseudoeph-DM (BROMFED DM) 2-30-10 mg/5 mL syrup Take 5 mL by mouth four (4) times daily as needed. Indications: COLD SYMPTOMS    B.infantis-B.ani-B.long-B.bifi (PROBIOTIC 4X) 10-15 mg TbEC Take  by mouth.  MULTIVIT-MIN/FA/LYCOPEN/LUTEIN (CENTRUM SILVER MEN PO) Take  by mouth.  CALCIUM PHOSPHATE TRIB/VIT D3 (CITRACAL + D3, CALCIUM PHOS, PO) Take  by mouth.  krill-omega-3-dha-epa-lipids (KRILL OIL) 235-24-02-51 mg cap Take  by mouth.  magnesium oxide (MAG-OX) 400 mg tablet Take 1 Tab by mouth two (2) times a day.  metoprolol (LOPRESSOR) 50 mg tablet Take  by mouth two (2) times a day. No current facility-administered medications for this visit.         Past Medical History:   Diagnosis Date    HTN (hypertension)     Prostate cancer ([facility] [address]        Past Surgical History:   Procedure Laterality Date    HX ORTHOPAEDIC      HX UROLOGICAL         Family History   Problem Relation Age of Onset    Stroke Father     Cancer Sister      colon       Social History     Social History    Marital status: SINGLE     Spouse name: N/A    Number of children: N/A    Years of education: N/A     Social History Main Topics    Smoking status: Never Smoker    Smokeless tobacco: Never Used    Alcohol use No    Drug use: None    Sexual activity: Not Asked     Other Topics Concern     Service Yes    Blood Transfusions No    Caffeine Concern No    Occupational Exposure No    Hobby Hazards No    Sleep Concern No    Stress Concern No    Weight Concern No    Special Diet No    Back Care No    Exercise No    Bike Helmet No    Seat Belt Yes    Self-Exams Yes     Social History Narrative       Review of Systems   Constitutional: Negative for chills. Respiratory: Negative for cough. Gastrointestinal: Positive for diarrhea. Negative for abdominal pain, anal bleeding and vomiting. Musculoskeletal: Negative for arthralgias, back pain and myalgias. Neurological: Negative for headaches. Visit Vitals    /75 (BP 1 Location: Left arm, BP Patient Position: Sitting)    Pulse (!) 58    Temp 97.4 °F (36.3 °C) (Oral)    Resp 18    Ht 5' 9\" (1.753 m)    Wt 191 lb (86.6 kg)    SpO2 98%    BMI 28.21 kg/m2     Physical Exam   Constitutional: He is oriented to person, place, and time and well-developed, well-nourished, and in no distress. HENT:   Head: Normocephalic and atraumatic. Nose: Nose normal.   Mouth/Throat: Oropharynx is clear and moist.   Eyes: Conjunctivae and EOM are normal. Pupils are equal, round, and reactive to light. Neck: Normal range of motion. Neck supple. No JVD present. No tracheal deviation present. No thyromegaly present. Cardiovascular: Normal rate, regular rhythm, normal heart sounds and intact distal pulses. Exam reveals no gallop and no friction rub. No murmur heard. Pulmonary/Chest: Effort normal and breath sounds normal. No respiratory distress. He has no wheezes. He has no rales. He exhibits no tenderness. Abdominal: Soft. Bowel sounds are normal. He exhibits no distension and no mass. There is no tenderness. There is no rebound and no guarding. Musculoskeletal: Normal range of motion. He exhibits no edema or tenderness. Lymphadenopathy:     He has no cervical adenopathy. Neurological: He is alert and oriented to person, place, and time. He has normal reflexes. No cranial nerve deficit. He exhibits normal muscle tone. Gait normal. Coordination normal.   Skin: Skin is warm and dry. No rash noted. No erythema. Psychiatric: Mood, memory, affect and judgment normal.   Vitals reviewed. ICD-10-CM ICD-9-CM    1. Change in bowel habits R19.4 787.99 REFERRAL TO GASTROENTEROLOGY       Orders Placed This Encounter    [facility]     Referral Priority:   Routine     Referral Type:   Consultation     Referral Reason:   Specialty Services Required     Referral Location:   [facility]     Referred to Provider:   Abdelrahman Guerrero MD   To rule out anything else going on and for the patient's peace of mind I believe that the colonoscopy is worthwhile. The patient is aware that he is at greater risk of perforation because of a thin wall to his gut.     Follow-up Disposition: Not on Nan Edmondson MD

## 2018-02-15 ENCOUNTER — DOCUMENTATION ONLY (OUTPATIENT)
Dept: FAMILY MEDICINE CLINIC | Age: 83
End: 2018-02-15

## 2018-12-27 PROBLEM — J96.00 ACUTE RESPIRATORY FAILURE (HCC): Status: ACTIVE | Noted: 2018-12-27

## 2019-01-03 PROBLEM — J96.00 ACUTE RESPIRATORY FAILURE (HCC): Status: RESOLVED | Noted: 2018-12-27 | Resolved: 2019-01-03

## 2020-09-25 ENCOUNTER — TRANSCRIBE ORDER (OUTPATIENT)
Dept: SCHEDULING | Age: 85
End: 2020-09-25

## 2020-09-25 DIAGNOSIS — K59.09 CONSTIPATION, CHRONIC: Primary | ICD-10-CM

## 2020-11-06 ENCOUNTER — OFFICE VISIT (OUTPATIENT)
Dept: ONCOLOGY | Age: 85
End: 2020-11-06
Payer: MEDICARE

## 2020-11-06 VITALS
TEMPERATURE: 97.8 F | HEIGHT: 68 IN | RESPIRATION RATE: 16 BRPM | DIASTOLIC BLOOD PRESSURE: 63 MMHG | HEART RATE: 67 BPM | BODY MASS INDEX: 21.95 KG/M2 | SYSTOLIC BLOOD PRESSURE: 123 MMHG | OXYGEN SATURATION: 95 % | WEIGHT: 144.8 LBS

## 2020-11-06 DIAGNOSIS — C61 PROSTATE CANCER (HCC): Primary | ICD-10-CM

## 2020-11-06 PROCEDURE — 99203 OFFICE O/P NEW LOW 30 MIN: CPT | Performed by: INTERNAL MEDICINE

## 2020-11-06 RX ORDER — SILODOSIN 8 MG/1
8 CAPSULE ORAL
COMMUNITY
End: 2021-01-29

## 2020-11-06 RX ORDER — OMEPRAZOLE 40 MG/1
40 CAPSULE, DELAYED RELEASE ORAL DAILY
COMMUNITY
End: 2021-01-29

## 2020-11-06 RX ORDER — FAMOTIDINE 40 MG/1
40 TABLET, FILM COATED ORAL DAILY
COMMUNITY
End: 2021-01-29

## 2020-11-06 NOTE — PROGRESS NOTES
Reason for Visit:   Sasha Mchugh is a 80 y.o. male who is seen for further management of Prostate Cancer    Treatment History:   Dx: Prostate Cancer--initially treated with proton therapy in [facility]. PSA recurrence and began Intermittent Androgen Deprivation with Lupron. Following PSA. No symptoms with increase PSA  Current Tx: Intermittent Androgen Deprivation with leuprolide   Goal: prolong life    History of Present Illness:   Here for above--wants to keep cancer care closer to his home. Had been seen at [facility] and getting intermittent androgen deprivation. Does not have symptoms with PSA rise, does not have symptoms with leuprolide--thinks may have gotten hot flashes with last shot but unsure. Progressive decline in strength over past year--nothing specific just weak. To start PT/OT    Past Medical History:   Diagnosis Date    Afib [facility] [address]     HTN (hypertension)     Prostate cancer ([facility] [address]       Past Surgical History:   Procedure Laterality Date    HX ORTHOPAEDIC     [facility] UROLOGICAL        Social History     Tobacco Use    Smoking status: Never Smoker    Smokeless tobacco: Never Used   Substance Use Topics    Alcohol use: No      Family History   Problem Relation Age of Onset    Stroke Father     Cancer Sister         colon     Current Outpatient Medications   Medication Sig    antiox #8/om3/dha/epa/lut/zeax (PRESERVISION AREDS 2, OMEGA-3, PO) Take 1 Cap by mouth two (2) times a day.  famotidine (PEPCID) 40 mg tablet Take 40 mg by mouth daily.  omeprazole (PRILOSEC) 40 mg capsule Take 40 mg by mouth daily.  silodosin (RAPAFLO) 8 mg capsule Take 8 mg by mouth daily (with breakfast).  metoprolol (LOPRESSOR) 50 mg tablet Take 50 mg by mouth daily. No current facility-administered medications for this visit.        Allergies   Allergen Reactions    Oxycodone-Acetaminophen Other (comments)     Cloudy conscious      Prednisone Other (comments)        Review of Systems: A complete review of systems was obtained, negative except as described above. Physical Exam:     Visit Vitals  /63   Pulse 67   Temp 97.8 °F (36.6 °C) (Oral)   Resp 16   Ht 5' 7.5\" (1.715 m)   Wt 144 lb 12.8 oz (65.7 kg)   SpO2 95%   BMI 22.34 kg/m²     ECOG PS: 2  General: No distress  Eyes: PERRLA, anicteric sclerae  HENT: Atraumatic, OP clear  Neck: Supple  Lymphatic: No cervical, supraclavicular, or inguinal adenopathy  Respiratory: CTAB, normal respiratory effort  CV: Normal rate, regular rhythm, no murmurs, no peripheral edema  GI: Soft, nontender, nondistended, no masses, no hepatomegaly, no splenomegaly  MS: Normal gait and station. Digits without clubbing or cyanosis. Skin: No rashes, ecchymoses, or petechiae. Normal temperature, turgor, and texture. Psych: Alert, oriented, appropriate affect, normal judgment/insight    Results:     Lab Results   Component Value Date/Time    WBC 4.3 12/27/2018 07:04 AM    HGB 12.4 12/27/2018 07:04 AM    HCT 36.7 12/27/2018 07:04 AM    PLATELET 421 90/97/6073 07:04 AM    MCV 89.1 12/27/2018 07:04 AM    ABS. NEUTROPHILS 2.1 12/27/2018 07:04 AM     Lab Results   Component Value Date/Time    Sodium 129 (L) 12/27/2018 07:04 AM    Potassium 4.0 12/27/2018 07:04 AM    Chloride 93 (L) 12/27/2018 07:04 AM    CO2 28 12/27/2018 07:04 AM    Glucose 118 (H) 12/27/2018 07:04 AM    BUN 10 12/27/2018 07:04 AM    Creatinine 0.69 (L) 12/27/2018 07:04 AM    GFR est AA >60 12/27/2018 07:04 AM    GFR est non-AA >60 12/27/2018 07:04 AM    Calcium 8.6 12/27/2018 07:04 AM     Lab Results   Component Value Date/Time    Bilirubin, total 0.4 08/24/2017 02:05 PM    ALT (SGPT) 13 08/24/2017 02:05 PM    Alk. phosphatase 55 08/24/2017 02:05 PM    Protein, total 6.4 08/24/2017 02:05 PM    Albumin 4.3 08/24/2017 02:05 PM       PSA 4.8 10/21/2020    Assessment:   1) Prostate Cancer      Plan:   1) Hormone Sensitive--return in 4 weeks to check PSA--watch how fast it rises.   Leuprolide when up or when has symptoms.   Has tolerated treatment very well in past.    Signed By: Kirsten Bravo MD

## 2020-11-06 NOTE — PROGRESS NOTES
Ga Medina is a 80 y.o. male new patient today referred by DR Abhinav Neri for history of prostate cancer.

## 2020-12-18 ENCOUNTER — OFFICE VISIT (OUTPATIENT)
Dept: ONCOLOGY | Age: 85
End: 2020-12-18
Payer: MEDICARE

## 2020-12-18 VITALS
HEART RATE: 66 BPM | DIASTOLIC BLOOD PRESSURE: 69 MMHG | HEIGHT: 68 IN | BODY MASS INDEX: 21.92 KG/M2 | RESPIRATION RATE: 16 BRPM | WEIGHT: 144.6 LBS | TEMPERATURE: 97.1 F | OXYGEN SATURATION: 97 % | SYSTOLIC BLOOD PRESSURE: 139 MMHG

## 2020-12-18 DIAGNOSIS — C61 PROSTATE CANCER (HCC): Primary | ICD-10-CM

## 2020-12-18 PROCEDURE — G8420 CALC BMI NORM PARAMETERS: HCPCS | Performed by: INTERNAL MEDICINE

## 2020-12-18 PROCEDURE — G8432 DEP SCR NOT DOC, RNG: HCPCS | Performed by: INTERNAL MEDICINE

## 2020-12-18 PROCEDURE — 99214 OFFICE O/P EST MOD 30 MIN: CPT | Performed by: INTERNAL MEDICINE

## 2020-12-18 PROCEDURE — G8536 NO DOC ELDER MAL SCRN: HCPCS | Performed by: INTERNAL MEDICINE

## 2020-12-18 PROCEDURE — 1101F PT FALLS ASSESS-DOCD LE1/YR: CPT | Performed by: INTERNAL MEDICINE

## 2020-12-18 PROCEDURE — G8427 DOCREV CUR MEDS BY ELIG CLIN: HCPCS | Performed by: INTERNAL MEDICINE

## 2020-12-18 RX ORDER — POLYETHYLENE GLYCOL 3350 17 G/17G
17 POWDER, FOR SOLUTION ORAL
COMMUNITY

## 2020-12-18 NOTE — PROGRESS NOTES
Urmila Warner is a 80 y.o. male who is seen for further management of Prostate Cancer. Key Oncology Meds     Patient is on no Oncologic meds. Key Pain Meds     The patient is on no pain meds.

## 2020-12-18 NOTE — PROGRESS NOTES
Reason for Visit:   Christiano Heard is a 80 y.o. male who is seen for further management of Prostate Cancer     Treatment History:   Dx: Prostate Cancer--initially treated with proton therapy in [facility]. PSA recurrence and began Intermittent Androgen Deprivation with Lupron. Following PSA. No symptoms with increase PSA  Current Tx: Intermittent Androgen Deprivation with leuprolide   Goal: prolong life    History of Present Illness:   Doing ok, no major issues, feels like getting weaker--persistent wt loss as well. No urine or stool issues. No appetite. Past Medical History:   Diagnosis Date    [facility]ib ([facility] [address]     HTN (hypertension)     Prostate cancer ([facility] [address]       Past Surgical History:   Procedure Laterality Date    HX ORTHOPAEDIC     [facility] UROLOGICAL        Social History     Tobacco Use    Smoking status: Never Smoker    Smokeless tobacco: Never Used   Substance Use Topics    Alcohol use: No      Family History   Problem Relation Age of Onset    Stroke Father     Cancer Sister         colon     Current Outpatient Medications   Medication Sig    antiox #8/om3/dha/epa/lut/zeax (PRESERVISION AREDS 2, OMEGA-3, PO) Take 1 Cap by mouth two (2) times a day.  famotidine (PEPCID) 40 mg tablet Take 40 mg by mouth daily.  omeprazole (PRILOSEC) 40 mg capsule Take 40 mg by mouth daily.  silodosin (RAPAFLO) 8 mg capsule Take 8 mg by mouth daily (with breakfast).  metoprolol (LOPRESSOR) 50 mg tablet Take 50 mg by mouth daily. No current facility-administered medications for this visit. Allergies   Allergen Reactions    Oxycodone-Acetaminophen Other (comments)     Cloudy conscious      Prednisone Other (comments)        Review of Systems: A complete review of systems was obtained, negative except as described above. Physical Exam:   There were no vitals taken for this visit.   ECOG PS: 2  General: No distress  Eyes: PERRLA, anicteric sclerae  HENT: Atraumatic, OP clear  Neck: Supple  Lymphatic: No cervical, supraclavicular, or inguinal adenopathy  Respiratory: CTAB, normal respiratory effort  CV: Normal rate, regular rhythm, no murmurs, no peripheral edema  GI: Soft, nontender, nondistended, no masses, no hepatomegaly, no splenomegaly  MS: Normal gait and station. Digits without clubbing or cyanosis. Skin: No rashes, ecchymoses, or petechiae. Normal temperature, turgor, and texture. Psych: Alert, oriented, appropriate affect, normal judgment/insight    Results:     Lab Results   Component Value Date/Time    WBC 4.3 12/27/2018 07:04 AM    HGB 12.4 12/27/2018 07:04 AM    HCT 36.7 12/27/2018 07:04 AM    PLATELET 331 51/20/7410 07:04 AM    MCV 89.1 12/27/2018 07:04 AM    ABS. NEUTROPHILS 2.1 12/27/2018 07:04 AM     Lab Results   Component Value Date/Time    Sodium 129 (L) 12/27/2018 07:04 AM    Potassium 4.0 12/27/2018 07:04 AM    Chloride 93 (L) 12/27/2018 07:04 AM    CO2 28 12/27/2018 07:04 AM    Glucose 118 (H) 12/27/2018 07:04 AM    BUN 10 12/27/2018 07:04 AM    Creatinine 0.69 (L) 12/27/2018 07:04 AM    GFR est AA >60 12/27/2018 07:04 AM    GFR est non-AA >60 12/27/2018 07:04 AM    Calcium 8.6 12/27/2018 07:04 AM     Lab Results   Component Value Date/Time    Bilirubin, total 0.4 08/24/2017 02:05 PM    ALT (SGPT) 13 08/24/2017 02:05 PM    Alk. phosphatase 55 08/24/2017 02:05 PM    Protein, total 6.4 08/24/2017 02:05 PM    Albumin 4.3 08/24/2017 02:05 PM       PSA 4.8 10/21/2020     Assessment:   1) Prostate Cancer     Plan:   1) Hormone Sensitive--return in 4 weeks to check PSA--watch how fast it rises. Leuprolide when up or when has symptoms. Has tolerated treatment very well in past.  Weakness and wt loss could be symptom.       Signed By: Nito Denson MD

## 2020-12-29 ENCOUNTER — TELEPHONE (OUTPATIENT)
Dept: ONCOLOGY | Age: 85
End: 2020-12-29

## 2020-12-29 NOTE — TELEPHONE ENCOUNTER
----- Message from Kt Mantilla MD sent at 12/23/2020  2:11 PM EST -----  PSA increased from prior--will repeat before making a decision on treatment--please inform

## 2020-12-29 NOTE — TELEPHONE ENCOUNTER
HIPAA verified. Relayed message concerning PSA  Labs results. Patient verbalized understanding, thanked for call.   He was aware of his follow up appointments for next month

## 2021-01-21 ENCOUNTER — OFFICE VISIT (OUTPATIENT)
Dept: ONCOLOGY | Age: 86
End: 2021-01-21
Payer: MEDICARE

## 2021-01-21 VITALS
HEIGHT: 68 IN | WEIGHT: 146 LBS | DIASTOLIC BLOOD PRESSURE: 65 MMHG | TEMPERATURE: 97.3 F | BODY MASS INDEX: 22.13 KG/M2 | RESPIRATION RATE: 16 BRPM | SYSTOLIC BLOOD PRESSURE: 126 MMHG | HEART RATE: 58 BPM | OXYGEN SATURATION: 97 %

## 2021-01-21 DIAGNOSIS — C61 PROSTATE CANCER (HCC): Primary | ICD-10-CM

## 2021-01-21 PROCEDURE — G8536 NO DOC ELDER MAL SCRN: HCPCS | Performed by: INTERNAL MEDICINE

## 2021-01-21 PROCEDURE — 1101F PT FALLS ASSESS-DOCD LE1/YR: CPT | Performed by: INTERNAL MEDICINE

## 2021-01-21 PROCEDURE — G8420 CALC BMI NORM PARAMETERS: HCPCS | Performed by: INTERNAL MEDICINE

## 2021-01-21 PROCEDURE — 99214 OFFICE O/P EST MOD 30 MIN: CPT | Performed by: INTERNAL MEDICINE

## 2021-01-21 PROCEDURE — G8427 DOCREV CUR MEDS BY ELIG CLIN: HCPCS | Performed by: INTERNAL MEDICINE

## 2021-01-21 PROCEDURE — G8510 SCR DEP NEG, NO PLAN REQD: HCPCS | Performed by: INTERNAL MEDICINE

## 2021-01-21 RX ORDER — ROSUVASTATIN CALCIUM 5 MG/1
1 TABLET, COATED ORAL
COMMUNITY
Start: 2021-01-18

## 2021-01-21 NOTE — PROGRESS NOTES
Reason for Visit:   Jyoti Cruz a 80 y. o. male who is seen for further management of Prostate Cancer     Treatment History:   Dx: Prostate Cancer--initially treated with proton therapy in [facility].  PSA recurrence and began Intermittent Androgen Deprivation with Lupron.  Following PSA.  No symptoms with increase PSA  Current Tx: Intermittent Androgen Deprivation with leuprolide   Goal: prolong life    History of Present Illness:   Still with considerable fatigue/wt loss. No new nodules/lumps/bumps    Past Medical History:   Diagnosis Date    Afib ([facility] [address]     HTN (hypertension)     Prostate cancer ([facility] [address]       Past Surgical History:   Procedure Laterality Date    HX ORTHOPAEDIC     [facility] UROLOGICAL        Social History     Tobacco Use    Smoking status: Never Smoker    Smokeless tobacco: Never Used   Substance Use Topics    Alcohol use: No      Family History   Problem Relation Age of Onset    Stroke Father     Cancer Sister         colon     Current Outpatient Medications   Medication Sig    rosuvastatin (CRESTOR) 5 mg tablet Take 1 Tab by mouth nightly.  metoprolol tartrate (LOPRESSOR) 50 mg tablet Take 1 Tab by mouth two (2) times a day.  apixaban (Eliquis) 2.5 mg tablet Take 1 Tab by mouth two (2) times a day.  polyethylene glycol (Miralax) 17 gram/dose powder Take 17 g by mouth daily.  antiox #8/om3/dha/epa/lut/zeax (PRESERVISION AREDS 2, OMEGA-3, PO) Take 1 Cap by mouth two (2) times a day.  omeprazole (PRILOSEC) 40 mg capsule Take 40 mg by mouth daily.  famotidine (PEPCID) 40 mg tablet Take 40 mg by mouth daily.  silodosin (RAPAFLO) 8 mg capsule Take 8 mg by mouth daily (with breakfast). No current facility-administered medications for this visit.        Allergies   Allergen Reactions    Oxycodone-Acetaminophen Other (comments)     Cloudy conscious      Prednisone Other (comments)        Review of Systems: A complete review of systems was obtained, negative except as described above. Physical Exam:     Visit Vitals  Pulse (!) 58   Temp 97.3 °F (36.3 °C) (Oral)   Resp 16   Ht 5' 7.5\" (1.715 m)   Wt 146 lb (66.2 kg)   SpO2 97%   BMI 22.53 kg/m²     ECOG PS: 1-2  General: No distress  Eyes: PERRLA, anicteric sclerae  HENT: Atraumatic, OP clear  Neck: Supple  Lymphatic: No cervical, supraclavicular, or inguinal adenopathy  Respiratory: CTAB, normal respiratory effort  CV: Normal rate, regular rhythm, no murmurs, no peripheral edema  GI: Soft, nontender, nondistended, no masses, no hepatomegaly, no splenomegaly  MS: Normal gait and station. Digits without clubbing or cyanosis. Skin: No rashes, ecchymoses, or petechiae. Normal temperature, turgor, and texture. Psych: Alert, oriented, appropriate affect, normal judgment/insight    Results:     Lab Results   Component Value Date/Time    WBC 5.3 01/19/2021 10:42 AM    HGB 12.1 01/19/2021 10:42 AM    HCT 36.2 (L) 01/19/2021 10:42 AM    PLATELET 728 33/48/2478 10:42 AM    MCV 92.3 01/19/2021 10:42 AM    ABS. NEUTROPHILS 3.5 01/19/2021 10:42 AM     Lab Results   Component Value Date/Time    Sodium 132 (L) 01/19/2021 10:42 AM    Potassium 4.0 01/19/2021 10:42 AM    Chloride 95 (L) 01/19/2021 10:42 AM    CO2 33 (H) 01/19/2021 10:42 AM    Glucose 114 (H) 01/19/2021 10:42 AM    BUN 13 01/19/2021 10:42 AM    Creatinine 0.66 (L) 01/19/2021 10:42 AM    GFR est AA >60 01/19/2021 10:42 AM    GFR est non-AA >60 01/19/2021 10:42 AM    Calcium 8.7 01/19/2021 10:42 AM     Lab Results   Component Value Date/Time    Bilirubin, total 0.4 01/19/2021 10:42 AM    ALT (SGPT) 24 01/19/2021 10:42 AM    Alk. phosphatase 65 01/19/2021 10:42 AM    Protein, total 6.1 (L) 01/19/2021 10:42 AM    Albumin 3.3 (L) 01/19/2021 10:42 AM    Globulin 2.8 01/19/2021 10:42 AM       PSA 7.7 1/13/2021     Assessment:   1) Prostate Cancer     Plan:   1) Hormone Sensitive--will restart leuprolide--has symptoms and rising PSA.   Back in 6 weeks to see how he does and if it helps his symptoms.       Signed By: Saad Irwin MD

## 2021-01-21 NOTE — PROGRESS NOTES
Krish Morel a 80 y. o. male who is seen for further management of Prostate Cancer. Patient states he feels fine, but is weak, has no energy or strength. Per is wife he sleeps well, he feels he sleeps to much. Over the last year he has lost 20+ lbs.

## 2021-02-09 ENCOUNTER — TRANSCRIBE ORDER (OUTPATIENT)
Dept: SCHEDULING | Age: 86
End: 2021-02-09

## 2021-02-09 DIAGNOSIS — I48.91 UNSPECIFIED ATRIAL FIBRILLATION (HCC): Primary | ICD-10-CM

## 2021-02-09 DIAGNOSIS — I48.91 ATRIAL FIBRILLATION (HCC): Primary | ICD-10-CM

## 2021-02-23 ENCOUNTER — OFFICE VISIT (OUTPATIENT)
Dept: CARDIOLOGY CLINIC | Age: 86
End: 2021-02-23
Payer: MEDICARE

## 2021-02-23 VITALS
RESPIRATION RATE: 16 BRPM | WEIGHT: 145 LBS | DIASTOLIC BLOOD PRESSURE: 82 MMHG | TEMPERATURE: 98.2 F | OXYGEN SATURATION: 96 % | HEART RATE: 51 BPM | HEIGHT: 67 IN | BODY MASS INDEX: 22.76 KG/M2 | SYSTOLIC BLOOD PRESSURE: 120 MMHG

## 2021-02-23 DIAGNOSIS — I10 ESSENTIAL HYPERTENSION: ICD-10-CM

## 2021-02-23 DIAGNOSIS — K21.9 GASTROESOPHAGEAL REFLUX DISEASE, UNSPECIFIED WHETHER ESOPHAGITIS PRESENT: ICD-10-CM

## 2021-02-23 DIAGNOSIS — E87.1 HYPONATREMIA: ICD-10-CM

## 2021-02-23 DIAGNOSIS — I48.0 PAROXYSMAL ATRIAL FIBRILLATION (HCC): Primary | ICD-10-CM

## 2021-02-23 DIAGNOSIS — K57.30 DIVERTICULOSIS OF LARGE INTESTINE WITHOUT HEMORRHAGE: ICD-10-CM

## 2021-02-23 DIAGNOSIS — I38 MILD VALVULAR HEART DISEASE: ICD-10-CM

## 2021-02-23 DIAGNOSIS — E78.5 DYSLIPIDEMIA: ICD-10-CM

## 2021-02-23 DIAGNOSIS — C61 PROSTATE CANCER (HCC): ICD-10-CM

## 2021-02-23 DIAGNOSIS — I77.810 DILATED AORTIC ROOT (HCC): ICD-10-CM

## 2021-02-23 PROBLEM — M62.838 MUSCLE SPASM: Status: RESOLVED | Noted: 2017-08-24 | Resolved: 2021-02-23

## 2021-02-23 PROCEDURE — 93000 ELECTROCARDIOGRAM COMPLETE: CPT | Performed by: INTERNAL MEDICINE

## 2021-02-23 PROCEDURE — G8510 SCR DEP NEG, NO PLAN REQD: HCPCS | Performed by: INTERNAL MEDICINE

## 2021-02-23 PROCEDURE — G8420 CALC BMI NORM PARAMETERS: HCPCS | Performed by: INTERNAL MEDICINE

## 2021-02-23 PROCEDURE — 1101F PT FALLS ASSESS-DOCD LE1/YR: CPT | Performed by: INTERNAL MEDICINE

## 2021-02-23 PROCEDURE — 99204 OFFICE O/P NEW MOD 45 MIN: CPT | Performed by: INTERNAL MEDICINE

## 2021-02-23 PROCEDURE — G8427 DOCREV CUR MEDS BY ELIG CLIN: HCPCS | Performed by: INTERNAL MEDICINE

## 2021-02-23 PROCEDURE — G8536 NO DOC ELDER MAL SCRN: HCPCS | Performed by: INTERNAL MEDICINE

## 2021-02-23 NOTE — LETTER
2/23/2021 Patient: Carol Ann Cade YOB: 1926 Date of Visit: 2/23/2021 Javier Guan MD 
[address] [place] [phone] Via Fax: [phone] Dear Javier Guan MD, Thank you for referring Mr. Carol Ann Cade to [facility] for evaluation. My notes for this consultation are attached. If you have questions, please do not hesitate to call me. I look forward to following your patient along with you.  
 
 
Sincerely, 
 
Roshan Haynes MD

## 2021-02-23 NOTE — PROGRESS NOTES
Demetrice Aldana is a 80 y.o. male is here for new patient evaluation--recently moved to [facility] and episodes of PAF/RVR--seen in ER and by PCP. On metoprolol, cardizem, and Eliquis. Sees Dr. Billy Jean as PCP. Hx hypertension, dyslipidemia, GERD, prostate Ca (s/p XRT). Lexiscan MPI 10/20/20 with normal perfusion--no infarct/ischemia, LVEF 66%. Recent Echo 2/17/21:  · LV: Estimated LVEF is 65 - 70%. Visually measured ejection fraction. Normal cavity size and systolic function (ejection fraction normal). Upper normal wall thickness. Mild (grade 1) left ventricular diastolic dysfunction. · LA: Severely dilated left atrium. Left Atrium volume index is 45 mL/m2. · AV: Mild aortic valve regurgitation is present. · MV: Mild mitral valve regurgitation is present. · TV: Mild tricuspid valve regurgitation is present. · PV: Mild pulmonic valve regurgitation is present. · AO: Mild aortic root dilatation. Aortic root diameter = 4.5 cm. ·   Recent OV and labs with Dr Frye--2/8/21--Hb 12.1, Na 126, gluc 117, BUN 12, Cr 0.5, K 4.8.  proBNP 742  The patient denies chest pain/ shortness of breath, orthopnea, PND, LE edema, palpitations, syncope, presyncope or fatigue.        Patient Active Problem List    Diagnosis Date Noted    Hyponatremia 02/23/2021    Dilated aortic root (HCC)     Mild valvular heart disease     Dyslipidemia     GERD (gastroesophageal reflux disease)     Diverticulosis of large intestine without hemorrhage 08/24/2017    History of total right knee replacement 08/24/2017    Gallstones 08/24/2017    History of atrial fibrillation 08/24/2017    Hypertension 05/31/2014    Prostate cancer ([facility] [address] 05/31/2014      Virginia Mitchell MD  Past Medical History:   Diagnosis Date    Afib [facility])     Dilated aortic root ([facility] [address]     Diverticulosis     Dyslipidemia     GERD (gastroesophageal reflux disease)     HTN (hypertension)     Hx of colonic polyps     Hyponatremia 2/23/2021    Mild valvular heart disease     Prostate cancer ([facility] [address] 2014    s/p XRT (proton beam)      Past Surgical History:   Procedure Laterality Date    HX ORTHOPAEDIC      HX UROLOGICAL       Allergies   Allergen Reactions    Oxycodone-Acetaminophen Other (comments)     Cloudy conscious      Prednisone Other (comments)      Family History   Problem Relation Age of Onset    Stroke Father     Hypertension Father     Cancer Sister         colon    Hypertension Mother       Social History     Socioeconomic History    Marital status: SINGLE     Spouse name: Not on file    Number of children: Not on file    Years of education: Not on file    Highest education level: Not on file   Occupational History    Not on file   Social Needs    Financial resource strain: Not on file    Food insecurity     Worry: Not on file     Inability: Not on file    Transportation needs     Medical: Not on file     Non-medical: Not on file   Tobacco Use    Smoking status: Never Smoker    Smokeless tobacco: Never Used   Substance and Sexual Activity    Alcohol use: No     Comment: quit 20 years ago- states he was a heavy drinker    Drug use: Never    Sexual activity: Not Currently   Lifestyle    Physical activity     Days per week: Not on file     Minutes per session: Not on file    Stress: Not on file   Relationships    Social connections     Talks on phone: Not on file     Gets together: Not on file     Attends Spiritism service: Not on file     Active member of club or organization: Not on file     Attends meetings of clubs or organizations: Not on file     Relationship status: Not on file    Intimate partner violence     Fear of current or ex partner: Not on file     Emotionally abused: Not on file     Physically abused: Not on file     Forced sexual activity: Not on file   Other Topics Concern     Service Yes    Blood Transfusions No    Caffeine Concern No    Occupational Exposure No    Hobby Hazards No    Sleep Concern No    Stress Concern No    Weight Concern No    Special Diet No    Back Care No    Exercise No    Bike Helmet No    Seat Belt Yes    Self-Exams Yes   Social History Narrative    Not on file      Current Outpatient Medications   Medication Sig    silodosin (RAPAFLO) 4 mg capsule Take 4 mg by mouth daily (with breakfast).  krill oil 500 mg cap Take 1 Cap by mouth daily (with breakfast).  cholecalciferol (Vitamin D3) 25 mcg (1,000 unit) cap Take 1,000 Units by mouth daily.  dilTIAZem ER (CARDIZEM LA) 180 mg tablet Take 1 Tab by mouth daily.  rosuvastatin (CRESTOR) 5 mg tablet Take 1 Tab by mouth nightly.  metoprolol tartrate (LOPRESSOR) 50 mg tablet Take 1 Tab by mouth two (2) times a day.  apixaban (Eliquis) 2.5 mg tablet Take 1 Tab by mouth two (2) times a day.  polyethylene glycol (Miralax) 17 gram/dose powder Take 17 g by mouth daily.  antiox #8/om3/dha/epa/lut/zeax (PRESERVISION AREDS 2, OMEGA-3, PO) Take 1 Cap by mouth two (2) times a day.  omeprazole (PRILOSEC) 10 mg capsule Take 10 mg by mouth daily. No current facility-administered medications for this visit. Review of Symptoms:    CONST  No weight change. No fever, chills, sweats    ENT No visual changes, URI sx, sore throat    CV  See HPI   RESP  No cough, or sputum, wheezing. Also see HPI   GI  No abdominal pain or change in bowel habits. No heartburn or dysphagia. No melena or rectal bleeding.   No dysuria, urgency, frequency, hematuria   MSKEL  No joint pain, swelling. No muscle pain. SKIN  No rash or lesions. NEURO  No headache, syncope, or seizure. No weakness, loss of sensation, or paresthesias. PSYCH  No low mood or depression  No anxiety. HE/LYMPH  No easy bruising, abnormal bleeding, or enlarged glands.         Physical ExamPhysical Exam:    Visit Vitals  /82 (BP 1 Location: Left upper arm, BP Patient Position: Sitting, BP Cuff Size: Adult)   Pulse (!) 51   Temp 98.2 °F (36.8 °C) (Temporal)   Resp 16   Ht 5' 7\" (1.702 m)   Wt 145 lb (65.8 kg)   SpO2 96%   BMI 22.71 kg/m²     Gen: NAD  HEENT:  PERRL, throat clear  Neck: no adenopathy, no thyromegaly, no JVD   Heart:  Regular,Nl S1S2,  I/VI murmur, no gallop or rub. Lungs:  clear  Abdomen:   Soft, non-tender, bowel sounds are active.    Extremities:  No edema  Pulse: symmetric  Neuro: A&O times 3, No focal neuro deficits    Cardiographics    ECG: NSR HR 66, RBBB      Labs:   Lab Results   Component Value Date/Time    Sodium 130 (L) 01/29/2021 06:55 AM    Sodium 132 (L) 01/19/2021 10:42 AM    Sodium 134 (L) 01/12/2021 03:20 PM    Sodium 134 (L) 12/18/2020 12:10 PM    Sodium 129 (L) 12/27/2018 07:04 AM    Potassium 4.4 01/29/2021 06:55 AM    Potassium 4.0 01/19/2021 10:42 AM    Potassium 4.3 01/12/2021 03:20 PM    Potassium 4.0 12/18/2020 12:10 PM    Potassium 4.0 12/27/2018 07:04 AM    Chloride 95 (L) 01/29/2021 06:55 AM    Chloride 95 (L) 01/19/2021 10:42 AM    Chloride 97 01/12/2021 03:20 PM    Chloride 95 (L) 12/18/2020 12:10 PM    Chloride 93 (L) 12/27/2018 07:04 AM    CO2 30 01/29/2021 06:55 AM    CO2 33 (H) 01/19/2021 10:42 AM    CO2 34 (H) 01/12/2021 03:20 PM    CO2 32 12/18/2020 12:10 PM    CO2 28 12/27/2018 07:04 AM    Anion gap 5 01/29/2021 06:55 AM    Anion gap 4 (L) 01/19/2021 10:42 AM    Anion gap 3 (L) 01/12/2021 03:20 PM    Anion gap 7 12/18/2020 12:10 PM    Anion gap 8 12/27/2018 07:04 AM    Glucose 119 (H) 01/29/2021 06:55 AM    Glucose 114 (H) 01/19/2021 10:42 AM    Glucose 106 (H) 01/12/2021 03:20 PM    Glucose 82 12/18/2020 12:10 PM    Glucose 118 (H) 12/27/2018 07:04 AM    BUN 13 01/29/2021 06:55 AM    BUN 13 01/19/2021 10:42 AM    BUN 12 01/12/2021 03:20 PM    BUN 10 12/18/2020 12:10 PM    BUN 10 12/27/2018 07:04 AM    Creatinine 0.62 (L) 01/29/2021 06:55 AM    Creatinine 0.66 (L) 01/19/2021 10:42 AM    Creatinine 0.62 (L) 01/12/2021 03:20 PM    Creatinine 0.60 (L) 12/18/2020 12:10 PM    Creatinine 0.69 (L) 12/27/2018 07:04 AM    BUN/Creatinine ratio 21 (H) 01/29/2021 06:55 AM    BUN/Creatinine ratio 20 01/19/2021 10:42 AM    BUN/Creatinine ratio 19 01/12/2021 03:20 PM    BUN/Creatinine ratio 17 12/18/2020 12:10 PM    BUN/Creatinine ratio 14 12/27/2018 07:04 AM    GFR est AA >60 01/29/2021 06:55 AM    GFR est AA >60 01/19/2021 10:42 AM    GFR est AA >60 01/12/2021 03:20 PM    GFR est AA >60 12/18/2020 12:10 PM    GFR est AA >60 12/27/2018 07:04 AM    GFR est non-AA >60 01/29/2021 06:55 AM    GFR est non-AA >60 01/19/2021 10:42 AM    GFR est non-AA >60 01/12/2021 03:20 PM    GFR est non-AA >60 12/18/2020 12:10 PM    GFR est non-AA >60 12/27/2018 07:04 AM    Calcium 8.8 01/29/2021 06:55 AM    Calcium 8.7 01/19/2021 10:42 AM    Calcium 9.0 01/12/2021 03:20 PM    Calcium 9.0 12/18/2020 12:10 PM    Calcium 8.6 12/27/2018 07:04 AM    Bilirubin, total 0.4 01/29/2021 06:55 AM    Bilirubin, total 0.4 01/19/2021 10:42 AM    Bilirubin, total 0.5 01/12/2021 03:20 PM    Bilirubin, total 0.4 12/18/2020 12:10 PM    Bilirubin, total 0.4 08/24/2017 02:05 PM    Alk. phosphatase 85 01/29/2021 06:55 AM    Alk. phosphatase 65 01/19/2021 10:42 AM    Alk. phosphatase 91 01/12/2021 03:20 PM    Alk. phosphatase 85 12/18/2020 12:10 PM    Alk.  phosphatase 55 08/24/2017 02:05 PM    Protein, total 6.1 (L) 01/29/2021 06:55 AM    Protein, total 6.1 (L) 01/19/2021 10:42 AM    Protein, total 6.6 01/12/2021 03:20 PM    Protein, total 6.7 12/18/2020 12:10 PM    Protein, total 6.4 08/24/2017 02:05 PM    Albumin 3.4 (L) 01/29/2021 06:55 AM    Albumin 3.3 (L) 01/19/2021 10:42 AM    Albumin 3.6 01/12/2021 03:20 PM    Albumin 3.6 12/18/2020 12:10 PM    Albumin 4.3 08/24/2017 02:05 PM    Globulin 2.7 01/29/2021 06:55 AM    Globulin 2.8 01/19/2021 10:42 AM    Globulin 3.0 01/12/2021 03:20 PM    Globulin 3.1 12/18/2020 12:10 PM    A-G Ratio 1.3 01/29/2021 06:55 AM    A-G Ratio 1.2 01/19/2021 10:42 AM    A-G Ratio 1.2 01/12/2021 03:20 PM    A-G Ratio 1.2 12/18/2020 12:10 PM    A-G Ratio 2.0 08/24/2017 02:05 PM    ALT (SGPT) 30 01/29/2021 06:55 AM    ALT (SGPT) 24 01/19/2021 10:42 AM    ALT (SGPT) 41 01/12/2021 03:20 PM    ALT (SGPT) 32 12/18/2020 12:10 PM    ALT (SGPT) 13 08/24/2017 02:05 PM     Lab Results   Component Value Date/Time     08/03/2018 11:15 PM     No results found for: CHOL, CHOLX, CHLST, CHOLV, [id number], HDL, HDLP, LDL, LDLC, DLDLP, TGLX, TRIGL, TRIGP, CHHD, CHHDX  No results found for this or any previous visit. Assessment:         Patient Active Problem List    Diagnosis Date Noted    Hyponatremia 02/23/2021    Dilated aortic root (HCC)     Mild valvular heart disease     Dyslipidemia     GERD (gastroesophageal reflux disease)     Diverticulosis of large intestine without hemorrhage 08/24/2017    History of total right knee replacement 08/24/2017    Gallstones 08/24/2017    History of atrial fibrillation 08/24/2017    Hypertension 05/31/2014    Prostate cancer ([facility] [address] 05/31/2014      80 y.o. male is here for new patient evaluation--recently moved to [facility] and episodes of PAF/RVR--seen in ER and by PCP. On metoprolol, cardizem, and Eliquis. Sees Dr. Delores Whitley as PCP. Hx hypertension, dyslipidemia, GERD, prostate Ca (s/p XRT). Lexiscan MPI 10/20/20 with normal perfusion--no infarct/ischemia, LVEF 66%. Recent Echo 2/17/21:  · LV: Estimated LVEF is 65 - 70%. Visually measured ejection fraction. Normal cavity size and systolic function (ejection fraction normal). Upper normal wall thickness. Mild (grade 1) left ventricular diastolic dysfunction. · LA: Severely dilated left atrium. Left Atrium volume index is 45 mL/m2. · AV: Mild aortic valve regurgitation is present. · MV: Mild mitral valve regurgitation is present. · TV: Mild tricuspid valve regurgitation is present. · PV: Mild pulmonic valve regurgitation is present. · AO: Mild aortic root dilatation. Aortic root diameter = 4.5 cm.   ·   Recent OV and labs with  [facility]--2/8/21--Hb 12.1, Na 126  gluc 117, BUN 12, Cr 0.5, K 4.8.  proBNP 742     Plan:     Testing, labs, testing (lexiscan MPI, Echo)--ER andoffice noteIs reviewed/discussed-- currently in NSR, doing ok since increased Cardizem CD to 180, continues on metoprolol/eliquis  Reviewed afib options--antiarrhthmics, ablation, pacing, etc  Conservative rx currently, not interested in aggressive/invasive  Fu with PCP as planned  RTC 3 mos, sooner swapna Natarajan MD

## 2021-02-23 NOTE — PROGRESS NOTES
Chief Complaint   Patient presents with    New Patient     needs to establish care      Verified patient with two patient identifiers. Medications reviewed/approved by Dr. Ronald Mackay. 1. Have you been to the ER, urgent care clinic since your last visit? Hospitalized since your last visit? new pt     2. Have you seen or consulted any other health care providers outside of the [address] [place] [address] since your last visit? Include any pap smears or colon screening.  New pt

## 2021-03-04 ENCOUNTER — OFFICE VISIT (OUTPATIENT)
Dept: ONCOLOGY | Age: 86
End: 2021-03-04
Payer: MEDICARE

## 2021-03-04 VITALS
TEMPERATURE: 96.9 F | WEIGHT: 143.6 LBS | RESPIRATION RATE: 16 BRPM | OXYGEN SATURATION: 98 % | SYSTOLIC BLOOD PRESSURE: 134 MMHG | HEIGHT: 67 IN | DIASTOLIC BLOOD PRESSURE: 71 MMHG | BODY MASS INDEX: 22.54 KG/M2 | HEART RATE: 61 BPM

## 2021-03-04 DIAGNOSIS — C61 PROSTATE CANCER (HCC): Primary | ICD-10-CM

## 2021-03-04 PROCEDURE — G8427 DOCREV CUR MEDS BY ELIG CLIN: HCPCS | Performed by: INTERNAL MEDICINE

## 2021-03-04 PROCEDURE — 99213 OFFICE O/P EST LOW 20 MIN: CPT | Performed by: INTERNAL MEDICINE

## 2021-03-04 PROCEDURE — G8420 CALC BMI NORM PARAMETERS: HCPCS | Performed by: INTERNAL MEDICINE

## 2021-03-04 PROCEDURE — G8536 NO DOC ELDER MAL SCRN: HCPCS | Performed by: INTERNAL MEDICINE

## 2021-03-04 PROCEDURE — 1101F PT FALLS ASSESS-DOCD LE1/YR: CPT | Performed by: INTERNAL MEDICINE

## 2021-03-04 PROCEDURE — G8510 SCR DEP NEG, NO PLAN REQD: HCPCS | Performed by: INTERNAL MEDICINE

## 2021-03-04 NOTE — PROGRESS NOTES
Aleshia Tash a 80 y. o. male who is seen for further management of Prostate Cancer. Patient is scheduled for his next Eligard on 4/20/2021      Key Oncology Meds     Patient is on no Oncologic meds. Key Pain Meds     The patient is on no pain meds.         Chemotherapy Flowsheet 1/26/2021   Cycle C1   Date 1/26/2021   Drug / Regimen Eligard   Dosage 22.5 mg   Time Up 1343   Notes Left abdominal tissue

## 2021-03-04 NOTE — PROGRESS NOTES
Reason for Visit:   Remedios Charlton a 80 y. o. male who is seen for further management of Prostate Cancer     Treatment History:   Dx: Prostate Cancer--initially diagnosed with T1c [place] score of 8 in 4/2010 after an incidental finding of elevated PSA up to 100. Metastatic workup at that time was negative. He was then started by his urologist on ADT from 4/2010 until 6/2012 when the patient decided to discontinue therapy due to side effects. He underwent proton beam radiation in 9/2012 due to a slight increase of his PSA. His PSA remained stable around 1 until 6/2018 when he was found to have PSA of 11.9 during his regular surveillance and began Intermittent Androgen Deprivation with Lupron.  Following PSA.  No symptoms with increase PSA   Current Tx: Intermittent Androgen Deprivation with leuprolide    Goal: prolong life/palliative    History of Present Illness:   No energy, weakness, some balance problems, using cane and hasn't fallen. Not sure if the weakness and fatigue are related to the leuprolide. Past Medical History:   Diagnosis Date    Afib ([facility] [address]     Dilated aortic root (HCC)     Diverticulosis     Dyslipidemia     GERD (gastroesophageal reflux disease)     HTN (hypertension)     Hx of colonic polyps     Hyponatremia 2/23/2021    Mild valvular heart disease     Prostate cancer ([facility] [address] 2014    s/p XRT (proton beam)      Past Surgical History:   Procedure Laterality Date    HX ORTHOPAEDIC      HX UROLOGICAL        Social History     Tobacco Use    Smoking status: Never Smoker    Smokeless tobacco: Never Used   Substance Use Topics    Alcohol use: No     Comment: quit 20 years ago- states he was a heavy drinker      Family History   Problem Relation Age of Onset   [de-identified] Stroke Father     Hypertension Father     Cancer Sister         colon    Hypertension Mother      Current Outpatient Medications   Medication Sig    omeprazole (PRILOSEC) 10 mg capsule Take 10 mg by mouth daily.  silodosin (RAPAFLO) 4 mg capsule Take 4 mg by mouth daily (with breakfast).  krill oil 500 mg cap Take 1 Cap by mouth daily (with breakfast).  cholecalciferol (Vitamin D3) 25 mcg (1,000 unit) cap Take 1,000 Units by mouth daily.  dilTIAZem ER (CARDIZEM LA) 180 mg tablet Take 1 Tab by mouth daily.  rosuvastatin (CRESTOR) 5 mg tablet Take 1 Tab by mouth nightly.  metoprolol tartrate (LOPRESSOR) 50 mg tablet Take 1 Tab by mouth two (2) times a day.  apixaban (Eliquis) 2.5 mg tablet Take 1 Tab by mouth two (2) times a day.  polyethylene glycol (Miralax) 17 gram/dose powder Take 17 g by mouth daily.  antiox #8/om3/dha/epa/lut/zeax (PRESERVISION AREDS 2, OMEGA-3, PO) Take 1 Cap by mouth two (2) times a day. No current facility-administered medications for this visit. Allergies   Allergen Reactions    Oxycodone-Acetaminophen Other (comments)     Cloudy conscious      Prednisone Other (comments)        Review of Systems: A complete review of systems was obtained, negative except as described above. Physical Exam:   There were no vitals taken for this visit. ECOG PS: 2  General: No distress  Eyes: PERRLA, anicteric sclerae  HENT: Atraumatic, OP clear  Neck: Supple  Lymphatic: No cervical, supraclavicular, or inguinal adenopathy  Respiratory: CTAB, normal respiratory effort  CV: Normal rate, regular rhythm, no murmurs, no peripheral edema  GI: Soft, nontender, nondistended, no masses, no hepatomegaly, no splenomegaly  MS: Normal gait and station. Digits without clubbing or cyanosis. Skin: No rashes, ecchymoses, or petechiae. Normal temperature, turgor, and texture. Psych: Alert, oriented, appropriate affect, normal judgment/insight    Results:     Lab Results   Component Value Date/Time    WBC 4.9 01/29/2021 06:55 AM    HGB 11.7 (L) 01/29/2021 06:55 AM    HCT 34.4 (L) 01/29/2021 06:55 AM    PLATELET 522 88/44/6045 06:55 AM    MCV 92.5 01/29/2021 06:55 AM    ABS.  NEUTROPHILS 3.0 01/29/2021 06:55 AM     Lab Results   Component Value Date/Time    Sodium 130 (L) 01/29/2021 06:55 AM    Potassium 4.4 01/29/2021 06:55 AM    Chloride 95 (L) 01/29/2021 06:55 AM    CO2 30 01/29/2021 06:55 AM    Glucose 119 (H) 01/29/2021 06:55 AM    BUN 13 01/29/2021 06:55 AM    Creatinine 0.62 (L) 01/29/2021 06:55 AM    GFR est AA >60 01/29/2021 06:55 AM    GFR est non-AA >60 01/29/2021 06:55 AM    Calcium 8.8 01/29/2021 06:55 AM     Lab Results   Component Value Date/Time    Bilirubin, total 0.4 01/29/2021 06:55 AM    ALT (SGPT) 30 01/29/2021 06:55 AM    Alk. phosphatase 85 01/29/2021 06:55 AM    Protein, total 6.1 (L) 01/29/2021 06:55 AM    Albumin 3.4 (L) 01/29/2021 06:55 AM    Globulin 2.7 01/29/2021 06:55 AM       PSA 7.7 1/13/2021     Assessment:   1) Prostate Cancer     Plan:   1) Hormone Sensitive--on leuprolide--recheck PSA today, if down will hold to see if energy and weakness improve. May benefit from PT--will defer to Dr Guerita Pace. Back in 2 months to see how he does and if it helps his symptoms.       Signed By: Rock Priscilla MD

## 2021-03-22 ENCOUNTER — TELEPHONE (OUTPATIENT)
Dept: ONCOLOGY | Age: 86
End: 2021-03-22

## 2021-03-22 NOTE — TELEPHONE ENCOUNTER
HIPAA verified. Patient had called to get results of PSA, provided results of 5.0. Patient verbalized understanding, in January it was 7.7. Patient has an appointment on 4/20/2021 for Brianna, he would like to know if he needs to keep the appointment for April since his PSA is going down.

## 2021-03-22 NOTE — TELEPHONE ENCOUNTER
Patient called stated that he has & apt with [facility] but never receive the results of PSA. Please call patient.

## 2021-03-24 NOTE — TELEPHONE ENCOUNTER
HIPAA verified. Spoke with patient concerning Dr Overton Sender note okay to hold Eligard. Patient then stated he feels it would be ovi to take the eligard as scheduled due to it would make his PSA go lower. Patient chose to keep his PSA appointment for 4/20/2021 for Eligard,  Lab appointment on 6/7 @ 1130 and OV f/u with Dr Real Addison on 6/10/2021 @ 1140. Patient was able to read back dates and times. Thanked for call.

## 2021-03-30 ENCOUNTER — HOSPITAL ENCOUNTER (OUTPATIENT)
Dept: NUTRITION | Age: 86
Discharge: HOME | End: 2021-03-30
Payer: MEDICARE

## 2021-03-30 ENCOUNTER — TRANSCRIBE ORDER (OUTPATIENT)
Dept: NUTRITION | Age: 86
End: 2021-03-30

## 2021-03-30 DIAGNOSIS — R63.4 LOSS OF WEIGHT: Primary | ICD-10-CM

## 2021-03-30 PROCEDURE — 97802 MEDICAL NUTRITION INDIV IN: CPT

## 2021-04-12 NOTE — PROGRESS NOTES
[facility] [address] [place] [address] Prasad [phone]  Outpatient Diet Hx / Assessment Name: Clay Posey   Diet Order: High calorie, high pro : 10/19/1926     Date: 3/30/21        Patient Information   Diagnosis: Wt loss, constipation    Past Medical Hx: Past Medical History:   Diagnosis Date    [facility]ib [facility] [address]     Dilated aortic root ([facility] [address]     Diverticulosis     Dyslipidemia     GERD (gastroesophageal reflux disease)     HTN (hypertension)     Hx of colonic polyps     Hyponatremia 2021    Mild valvular heart disease     Prostate cancer ([facility] [address]     s/p XRT (proton beam)      Exercise/Activity: None    # in Household: 2 Cooking/shopping responsibility: Together    ETOH:  none Smoking: none   Nausea/Vomiting: none Bowel Problems: Constipation    Current Meds: lopressor, eliqus,vit d 3, crestor, prilosec    Current Wt: 135#  Hgt: 69in  BMI: 19.9 IBW: 160# Goal Wt: 160#   Wt ?: 170# 1 yr ago  Appetite: Poor       24- hour Diet Recall:   Time Foods Eaten   breakfast    1 pack of grits, blueberries and raspberries on to, coffee and prune juice with boost in the am, benefiber and 3 prunes              Lunch    None (state they get up so late)          snack water             Dinner  Soup (often lobster bisque) and broth      Usual Intake:    Milk: 0 Times a day/week. Comments: none ? Only on Cereal   ? Skim ? 1% ? 2% ? Whole Milk ? Soy Milk ? Other   Vegetables: 0 Times a day/week. [id number] 2-3x wk and mashed potatoes sometimes    Fruits: 1-2 Times a day smoothie   Meats: 2-3 Times a week How often? Hard for pt to chew most meats     ? Red meat Not much  ? White Meat Not much  ? Eggs no   ? High-fat meats no ? Fried Meats No  ? Fish Yes    Fast Food / Eating out 1-2 Times a month    Fluid Intake:  Mainly ? x Water ? X Juices ? Milk ? Soft Drinks ? X Coffee   ? X Tea ? Sugar sweetened drinks oj and prune juice         ? Meal Pattern: ? 3 meals/day ? snacks ?  X skip meals Blood Sugar @ home: (if applicable) No    Other pertinent info: Pt has no appetite, he likes boost but was told to try to eat food and     Cut back but this is now not working for him as he has lost wt and cannot keep up his appetite with     Solid foods         Pt is interested in silver sneakers and will ask his PCP about this      Pts Diet seems to be: Low in Mod in High in  Low in Mod in High in    x   Calories x   Fiber   x   Protein  x  Sodium   x   Fat x   Vitamins / Minerals   x   Carbohydrates  x  Sugar    x  Cholesterol x   Calcium             Comments/Recommendations/Goals   Main diet concerns: Pt skips meals, his caloric and protein intake do not meet his needs    He has lost a sig amount of wt in the last yr     His strength and gait are not well     Pt sleeps a lot causing him to skip meals as well        Reviewed diet with Patient / Significant other: Both    Barriers to diet compliance Y/N: no      Recommendations:     ? Increase boost or ensure to 3-4  A day-pt can survive on a liquid diet-he enjoys this     ? Pt should not skip meals and try to do more small meals more frequently     ? Pt has hard time walking and had to be wheeled today-might benefit from PT    ? Do fluids with calories, increase these if he is not eating solids     ? Pt may benefit from appetite stimulant if needed     ? Pt needs to do more protein and more veggies     Materials provided:      ? Sample smoothies recipes ? ? Increasing calories and protein in the diet     ? ? Weight gaining guidelines ? ? Tips to increase calories in the diet  ? Sample menu for wt gain  ? Other                       Individualized Goals:      Initial Goals #1 Increase calories via boost or ensure-can drink 3-4 day if needed    #2 Drink fluids with calories- ie.  Pt agreed to sweet tea, meds with juice etc    #3 Try some different smoothies with whey protein and sample recipes received    #4 Exercise-pt may need physical therapy again (difficulty walking) and wants to go to gym via silver sneakers program                 Georgie Erika RD

## 2021-04-20 ENCOUNTER — HOSPITAL ENCOUNTER (OUTPATIENT)
Dept: INFUSION THERAPY | Age: 86
Discharge: HOME | End: 2021-04-20
Payer: MEDICARE

## 2021-04-20 ENCOUNTER — TELEPHONE (OUTPATIENT)
Dept: INFUSION THERAPY | Age: 86
End: 2021-04-20

## 2021-04-20 VITALS
SYSTOLIC BLOOD PRESSURE: 167 MMHG | DIASTOLIC BLOOD PRESSURE: 77 MMHG | RESPIRATION RATE: 18 BRPM | OXYGEN SATURATION: 97 % | WEIGHT: 135.2 LBS | BODY MASS INDEX: 21.18 KG/M2 | TEMPERATURE: 96.4 F | HEART RATE: 61 BPM

## 2021-04-20 DIAGNOSIS — C61 PROSTATE CANCER (HCC): Primary | ICD-10-CM

## 2021-04-20 LAB — PSA SERPL-MCNC: 5.5 NG/ML (ref 0.01–4)

## 2021-04-20 PROCEDURE — 84153 ASSAY OF PSA TOTAL: CPT

## 2021-04-20 PROCEDURE — 74011250636 HC RX REV CODE- 250/636: Performed by: INTERNAL MEDICINE

## 2021-04-20 PROCEDURE — 96402 CHEMO HORMON ANTINEOPL SQ/IM: CPT

## 2021-04-20 PROCEDURE — 36415 COLL VENOUS BLD VENIPUNCTURE: CPT

## 2021-04-20 RX ADMIN — LEUPROLIDE ACETATE 22.5 MG: KIT SUBCUTANEOUS at 14:08

## 2021-04-20 NOTE — PROGRESS NOTES
Problem: Patient Education:  Go to Education Activity  Goal: Patient/Family Education  Outcome: Resolved/Met

## 2021-04-20 NOTE — PROGRESS NOTES
[facility] Progress Note    Date: 2021    Name: Evan Alas    MRN: [id number]         : 10/19/1926    Mr. Williamson Arrived ambulatory and in no distress for cycle 2 day 1 of Eligard regimen. Assessment was completed, no acute issues at this time, no new complaints voiced. PSA drawn by phlebotomist.    Chemotherapy Flowsheet 2021   Cycle C2   Date 2021   Drug / Regimen Eligard   Dosage 22.5 mg   Time Up 1408   Notes right abdominal tissue       Mr. Wills December vitals were reviewed. Visit Vitals  BP (!) 167/77 (BP 1 Location: Left upper arm)   Pulse 61   Temp (!) 96.4 °F (35.8 °C)   Resp 18   Wt 61.3 kg (135 lb 3.2 oz)   SpO2 97%   BMI 21.18 kg/m²       Eligard 22.5 mg was administered subcutaneous to right abdominal tissue. Mr. Kay Mendoza tolerated treatment well and was discharged from [place] [id number] in stable condition at 1420. He is to return on 2021 at 1330 for his next appointment.     Sudeep Hamilton RN  2021

## 2021-05-11 ENCOUNTER — TELEPHONE (OUTPATIENT)
Dept: CARDIOLOGY CLINIC | Age: 86
End: 2021-05-11

## 2021-05-11 NOTE — TELEPHONE ENCOUNTER
pts spouse called in to report that the eliquis is too expensive and the pt will NOT be taking it. Pts spouse reports that pt does not have RX insurance. Advised to discuss coumadin with Min Molina regarding the transition as we do not have a coumadin clinic at this location. Wife verbalized understanding. Verified patient with two patient identifiers.

## 2021-05-14 ENCOUNTER — HOSPITAL ENCOUNTER (EMERGENCY)
Age: 86
Discharge: ANOTHER HOSPITAL | End: 2021-05-14
Attending: EMERGENCY MEDICINE
Payer: MEDICARE

## 2021-05-14 ENCOUNTER — APPOINTMENT (OUTPATIENT)
Dept: GENERAL RADIOLOGY | Age: 86
End: 2021-05-14
Attending: EMERGENCY MEDICINE
Payer: MEDICARE

## 2021-05-14 ENCOUNTER — HOSPITAL ENCOUNTER (INPATIENT)
Age: 86
LOS: 6 days | Discharge: HOSPICE | DRG: 193 | End: 2021-05-20
Attending: EMERGENCY MEDICINE | Admitting: STUDENT IN AN ORGANIZED HEALTH CARE EDUCATION/TRAINING PROGRAM
Payer: MEDICARE

## 2021-05-14 ENCOUNTER — APPOINTMENT (OUTPATIENT)
Dept: CT IMAGING | Age: 86
End: 2021-05-14
Attending: EMERGENCY MEDICINE
Payer: MEDICARE

## 2021-05-14 VITALS
SYSTOLIC BLOOD PRESSURE: 123 MMHG | OXYGEN SATURATION: 100 % | TEMPERATURE: 98.3 F | DIASTOLIC BLOOD PRESSURE: 85 MMHG | HEART RATE: 92 BPM | RESPIRATION RATE: 24 BRPM

## 2021-05-14 DIAGNOSIS — E78.5 DYSLIPIDEMIA: ICD-10-CM

## 2021-05-14 DIAGNOSIS — I50.9 ACUTE CONGESTIVE HEART FAILURE, UNSPECIFIED HEART FAILURE TYPE (HCC): ICD-10-CM

## 2021-05-14 DIAGNOSIS — J96.01 ACUTE RESPIRATORY FAILURE WITH HYPOXEMIA (HCC): Primary | ICD-10-CM

## 2021-05-14 DIAGNOSIS — J18.9 PNEUMONIA OF BOTH LOWER LOBES DUE TO INFECTIOUS ORGANISM: ICD-10-CM

## 2021-05-14 DIAGNOSIS — J18.9 COMMUNITY ACQUIRED PNEUMONIA, UNSPECIFIED LATERALITY: ICD-10-CM

## 2021-05-14 DIAGNOSIS — E44.0 MODERATE PROTEIN-CALORIE MALNUTRITION (HCC): ICD-10-CM

## 2021-05-14 DIAGNOSIS — I48.0 PAF (PAROXYSMAL ATRIAL FIBRILLATION) (HCC): ICD-10-CM

## 2021-05-14 DIAGNOSIS — Z86.79 HISTORY OF ATRIAL FIBRILLATION: ICD-10-CM

## 2021-05-14 DIAGNOSIS — J96.01 ACUTE RESPIRATORY FAILURE WITH HYPOXIA (HCC): Primary | ICD-10-CM

## 2021-05-14 DIAGNOSIS — I48.20 CHRONIC ATRIAL FIBRILLATION (HCC): ICD-10-CM

## 2021-05-14 DIAGNOSIS — R63.0 POOR APPETITE: ICD-10-CM

## 2021-05-14 DIAGNOSIS — I10 ESSENTIAL HYPERTENSION: ICD-10-CM

## 2021-05-14 DIAGNOSIS — E87.1 HYPONATREMIA: ICD-10-CM

## 2021-05-14 DIAGNOSIS — R53.81 PHYSICAL DEBILITY: ICD-10-CM

## 2021-05-14 DIAGNOSIS — Z71.89 GOALS OF CARE, COUNSELING/DISCUSSION: ICD-10-CM

## 2021-05-14 DIAGNOSIS — I77.810 DILATED AORTIC ROOT (HCC): ICD-10-CM

## 2021-05-14 DIAGNOSIS — G93.40 ENCEPHALOPATHY ACUTE: ICD-10-CM

## 2021-05-14 LAB
ALBUMIN SERPL-MCNC: 3.4 G/DL (ref 3.5–5)
ALBUMIN/GLOB SERPL: 1.1 {RATIO} (ref 1.1–2.2)
ALP SERPL-CCNC: 73 U/L (ref 45–117)
ALT SERPL-CCNC: 44 U/L (ref 12–78)
ANION GAP SERPL CALC-SCNC: 2 MMOL/L (ref 5–15)
ARTERIAL PATENCY WRIST A: YES
AST SERPL-CCNC: 25 U/L (ref 15–37)
BASE EXCESS BLDA CALC-SCNC: 10.1 MMOL/L
BASOPHILS # BLD: 0 K/UL (ref 0–0.1)
BASOPHILS NFR BLD: 0 % (ref 0–1)
BDY SITE: ABNORMAL
BILIRUB SERPL-MCNC: 0.6 MG/DL (ref 0.2–1)
BNP SERPL-MCNC: 7493 PG/ML (ref 0–450)
BUN SERPL-MCNC: 7 MG/DL (ref 6–20)
BUN/CREAT SERPL: 12 (ref 12–20)
CALCIUM SERPL-MCNC: 8.8 MG/DL (ref 8.5–10.1)
CHLORIDE SERPL-SCNC: 89 MMOL/L (ref 97–108)
CO2 SERPL-SCNC: 38 MMOL/L (ref 21–32)
CREAT SERPL-MCNC: 0.59 MG/DL (ref 0.7–1.3)
DIFFERENTIAL METHOD BLD: ABNORMAL
EOSINOPHIL # BLD: 0 K/UL (ref 0–0.4)
EOSINOPHIL NFR BLD: 0 % (ref 0–7)
ERYTHROCYTE [DISTWIDTH] IN BLOOD BY AUTOMATED COUNT: 12.7 % (ref 11.5–14.5)
FLUAV RNA SPEC QL NAA+PROBE: NOT DETECTED
FLUBV RNA SPEC QL NAA+PROBE: NOT DETECTED
GLOBULIN SER CALC-MCNC: 3.2 G/DL (ref 2–4)
GLUCOSE SERPL-MCNC: 144 MG/DL (ref 65–100)
HCO3 BLDA-SCNC: 39 MMOL/L (ref 22–26)
HCT VFR BLD AUTO: 34.9 % (ref 36.6–50.3)
HGB BLD-MCNC: 11.4 G/DL (ref 12.1–17)
IMM GRANULOCYTES # BLD AUTO: 0 K/UL (ref 0–0.04)
IMM GRANULOCYTES NFR BLD AUTO: 0 % (ref 0–0.5)
INR PPP: 1.2 (ref 0.9–1.1)
LACTATE SERPL-SCNC: 1.1 MMOL/L (ref 0.4–2)
LYMPHOCYTES # BLD: 0.3 K/UL (ref 0.8–3.5)
LYMPHOCYTES NFR BLD: 3 % (ref 12–49)
MAGNESIUM SERPL-MCNC: 2.1 MG/DL (ref 1.6–2.4)
MCH RBC QN AUTO: 30.6 PG (ref 26–34)
MCHC RBC AUTO-ENTMCNC: 32.7 G/DL (ref 30–36.5)
MCV RBC AUTO: 93.6 FL (ref 80–99)
MONOCYTES # BLD: 0.3 K/UL (ref 0–1)
MONOCYTES NFR BLD: 3 % (ref 5–13)
NEUTS BAND NFR BLD MANUAL: 2 %
NEUTS SEG # BLD: 9.8 K/UL (ref 1.8–8)
NEUTS SEG NFR BLD: 92 % (ref 32–75)
NRBC # BLD: 0 K/UL (ref 0–0.01)
NRBC BLD-RTO: 0 PER 100 WBC
PCO2 BLDA: 69 MMHG (ref 35–45)
PH BLDA: 7.37 [PH] (ref 7.35–7.45)
PLATELET # BLD AUTO: 140 K/UL (ref 150–400)
PLATELET COMMENTS,PCOM: ABNORMAL
PMV BLD AUTO: 9.2 FL (ref 8.9–12.9)
PO2 BLDA: 46 MMHG (ref 80–100)
POTASSIUM SERPL-SCNC: 4 MMOL/L (ref 3.5–5.1)
PROT SERPL-MCNC: 6.6 G/DL (ref 6.4–8.2)
PROTHROMBIN TIME: 11.6 SEC (ref 9–11.1)
RBC # BLD AUTO: 3.73 M/UL (ref 4.1–5.7)
RBC MORPH BLD: ABNORMAL
SAO2 % BLD: 79 % (ref 92–97)
SAO2% DEVICE SAO2% SENSOR NAME: ABNORMAL
SARS-COV-2, COV2: NORMAL
SARS-COV-2, COV2: NOT DETECTED
SERVICE CMNT-IMP: ABNORMAL
SODIUM SERPL-SCNC: 129 MMOL/L (ref 136–145)
SPECIMEN SITE: ABNORMAL
TROPONIN I SERPL-MCNC: <0.05 NG/ML
WBC # BLD AUTO: 10.4 K/UL (ref 4.1–11.1)

## 2021-05-14 PROCEDURE — 87636 SARSCOV2 & INF A&B AMP PRB: CPT

## 2021-05-14 PROCEDURE — 5A09357 ASSISTANCE WITH RESPIRATORY VENTILATION, LESS THAN 24 CONSECUTIVE HOURS, CONTINUOUS POSITIVE AIRWAY PRESSURE: ICD-10-PCS | Performed by: INTERNAL MEDICINE

## 2021-05-14 PROCEDURE — 99285 EMERGENCY DEPT VISIT HI MDM: CPT

## 2021-05-14 PROCEDURE — 83880 ASSAY OF NATRIURETIC PEPTIDE: CPT

## 2021-05-14 PROCEDURE — 36600 WITHDRAWAL OF ARTERIAL BLOOD: CPT

## 2021-05-14 PROCEDURE — 74011250636 HC RX REV CODE- 250/636: Performed by: EMERGENCY MEDICINE

## 2021-05-14 PROCEDURE — 85610 PROTHROMBIN TIME: CPT

## 2021-05-14 PROCEDURE — 83605 ASSAY OF LACTIC ACID: CPT

## 2021-05-14 PROCEDURE — 96375 TX/PRO/DX INJ NEW DRUG ADDON: CPT

## 2021-05-14 PROCEDURE — 65660000000 HC RM CCU STEPDOWN

## 2021-05-14 PROCEDURE — 80053 COMPREHEN METABOLIC PANEL: CPT

## 2021-05-14 PROCEDURE — 99283 EMERGENCY DEPT VISIT LOW MDM: CPT

## 2021-05-14 PROCEDURE — 87040 BLOOD CULTURE FOR BACTERIA: CPT

## 2021-05-14 PROCEDURE — 84484 ASSAY OF TROPONIN QUANT: CPT

## 2021-05-14 PROCEDURE — 74011250636 HC RX REV CODE- 250/636: Performed by: STUDENT IN AN ORGANIZED HEALTH CARE EDUCATION/TRAINING PROGRAM

## 2021-05-14 PROCEDURE — 82803 BLOOD GASES ANY COMBINATION: CPT

## 2021-05-14 PROCEDURE — 36415 COLL VENOUS BLD VENIPUNCTURE: CPT

## 2021-05-14 PROCEDURE — 74011000636 HC RX REV CODE- 636: Performed by: EMERGENCY MEDICINE

## 2021-05-14 PROCEDURE — 71045 X-RAY EXAM CHEST 1 VIEW: CPT

## 2021-05-14 PROCEDURE — 83735 ASSAY OF MAGNESIUM: CPT

## 2021-05-14 PROCEDURE — 70450 CT HEAD/BRAIN W/O DYE: CPT

## 2021-05-14 PROCEDURE — 74011000258 HC RX REV CODE- 258: Performed by: EMERGENCY MEDICINE

## 2021-05-14 PROCEDURE — 96365 THER/PROPH/DIAG IV INF INIT: CPT

## 2021-05-14 PROCEDURE — 85025 COMPLETE CBC W/AUTO DIFF WBC: CPT

## 2021-05-14 PROCEDURE — 71275 CT ANGIOGRAPHY CHEST: CPT

## 2021-05-14 PROCEDURE — U0005 INFEC AGEN DETEC AMPLI PROBE: HCPCS

## 2021-05-14 PROCEDURE — 93005 ELECTROCARDIOGRAM TRACING: CPT

## 2021-05-14 RX ORDER — ROSUVASTATIN CALCIUM 5 MG/1
5 TABLET, COATED ORAL
Status: DISCONTINUED | OUTPATIENT
Start: 2021-05-14 | End: 2021-05-19

## 2021-05-14 RX ORDER — ONDANSETRON 4 MG/1
4 TABLET, ORALLY DISINTEGRATING ORAL
Status: DISCONTINUED | OUTPATIENT
Start: 2021-05-14 | End: 2021-05-19

## 2021-05-14 RX ORDER — SODIUM CHLORIDE 0.9 % (FLUSH) 0.9 %
5-40 SYRINGE (ML) INJECTION EVERY 8 HOURS
Status: DISCONTINUED | OUTPATIENT
Start: 2021-05-14 | End: 2021-05-20 | Stop reason: HOSPADM

## 2021-05-14 RX ORDER — METOPROLOL TARTRATE 50 MG/1
50 TABLET ORAL 2 TIMES DAILY
Status: DISCONTINUED | OUTPATIENT
Start: 2021-05-15 | End: 2021-05-19

## 2021-05-14 RX ORDER — FUROSEMIDE 10 MG/ML
40 INJECTION INTRAMUSCULAR; INTRAVENOUS 2 TIMES DAILY
Status: DISCONTINUED | OUTPATIENT
Start: 2021-05-15 | End: 2021-05-14

## 2021-05-14 RX ORDER — POLYETHYLENE GLYCOL 3350 17 G/17G
17 POWDER, FOR SOLUTION ORAL DAILY PRN
Status: DISCONTINUED | OUTPATIENT
Start: 2021-05-14 | End: 2021-05-20 | Stop reason: HOSPADM

## 2021-05-14 RX ORDER — FUROSEMIDE 10 MG/ML
40 INJECTION INTRAMUSCULAR; INTRAVENOUS DAILY
Status: DISCONTINUED | OUTPATIENT
Start: 2021-05-15 | End: 2021-05-16

## 2021-05-14 RX ORDER — ONDANSETRON 2 MG/ML
4 INJECTION INTRAMUSCULAR; INTRAVENOUS
Status: DISCONTINUED | OUTPATIENT
Start: 2021-05-14 | End: 2021-05-20 | Stop reason: HOSPADM

## 2021-05-14 RX ORDER — DILTIAZEM HYDROCHLORIDE 5 MG/ML
5 INJECTION INTRAVENOUS ONCE
Status: COMPLETED | OUTPATIENT
Start: 2021-05-14 | End: 2021-05-15

## 2021-05-14 RX ORDER — SODIUM CHLORIDE 0.9 % (FLUSH) 0.9 %
5-10 SYRINGE (ML) INJECTION ONCE
Status: DISCONTINUED | OUTPATIENT
Start: 2021-05-14 | End: 2021-05-14 | Stop reason: HOSPADM

## 2021-05-14 RX ORDER — FUROSEMIDE 10 MG/ML
20 INJECTION INTRAMUSCULAR; INTRAVENOUS
Status: COMPLETED | OUTPATIENT
Start: 2021-05-14 | End: 2021-05-14

## 2021-05-14 RX ORDER — DILTIAZEM HYDROCHLORIDE 180 MG/1
180 CAPSULE, COATED, EXTENDED RELEASE ORAL DAILY
Status: DISCONTINUED | OUTPATIENT
Start: 2021-05-15 | End: 2021-05-14

## 2021-05-14 RX ORDER — ALBUTEROL SULFATE 90 UG/1
2 AEROSOL, METERED RESPIRATORY (INHALATION)
Status: DISCONTINUED | OUTPATIENT
Start: 2021-05-15 | End: 2021-05-16

## 2021-05-14 RX ORDER — SODIUM CHLORIDE 0.9 % (FLUSH) 0.9 %
5-40 SYRINGE (ML) INJECTION AS NEEDED
Status: DISCONTINUED | OUTPATIENT
Start: 2021-05-14 | End: 2021-05-20 | Stop reason: HOSPADM

## 2021-05-14 RX ADMIN — IOPAMIDOL 100 ML: 755 INJECTION, SOLUTION INTRAVENOUS at 17:33

## 2021-05-14 RX ADMIN — AZITHROMYCIN MONOHYDRATE 500 MG: 500 INJECTION, POWDER, LYOPHILIZED, FOR SOLUTION INTRAVENOUS at 21:36

## 2021-05-14 RX ADMIN — FUROSEMIDE 20 MG: 10 INJECTION, SOLUTION INTRAMUSCULAR; INTRAVENOUS at 15:50

## 2021-05-14 RX ADMIN — CEFTRIAXONE SODIUM 1 G: 1 INJECTION, POWDER, FOR SOLUTION INTRAMUSCULAR; INTRAVENOUS at 15:53

## 2021-05-14 RX ADMIN — Medication 10 ML: at 22:01

## 2021-05-14 NOTE — PROGRESS NOTES
Spoke with Iris Mchugh of [facility] to arrange ALS transport to [address] [facility] ED.  ETA of [phone] given

## 2021-05-14 NOTE — ROUTINE PROCESS
RT note: ABG on RA, Spo2 82%  RN at bedside Results for Emily Mercado (MRN [id number]) as of 5/14/2021 15:24 Ref. Range 5/14/2021 15:20  
pH Latest Ref Range: 7.35 - 7.45   7.37  
PCO2 Latest Ref Range: 35 - 45 mmHg 69 (H)  
PO2 Latest Ref Range: 80 - 100 mmHg 46 (LL)  
BICARBONATE Latest Ref Range: 22 - 26 mmol/L 39 (H) O2 SAT Latest Ref Range: 92 - 97 % 79 (L) BASE EXCESS Latest Units: mmol/L 10.1 Sample source Latest Units:   ARTERIAL  
SITE Latest Units:   RIGHT RADIAL ABISAI'S TEST Latest Units:   YES  
O2 METHOD Latest Units:   ROOM AIR Critical value read back Unknown Called to Kandy Nyhan, MD on 05/14/2021 at 15:24

## 2021-05-14 NOTE — ED NOTES
TRANSFER - OUT REPORT:    Verbal report given to [facility] with [facility] on Bev   being transferred to [facility] ED for routine progression of care       Report consisted of patients Situation, Background, Assessment and   Recommendations(SBAR). Information from the following report(s) SBAR, ED Summary, Intake/Output and MAR was reviewed with the receiving nurse. Lines:   Peripheral IV 05/14/21 Right Wrist (Active)   Site Assessment Clean, dry, & intact 05/14/21 1534   Phlebitis Assessment 0 05/14/21 1534   Infiltration Assessment 0 05/14/21 1534   Dressing Status Clean, dry, & intact 05/14/21 1534        Opportunity for questions and clarification was provided.       Patient transported with:   Monitor  O2 @ 3 liters

## 2021-05-14 NOTE — ED NOTES
Attempted to obtain covid swab and patient became unresponsive. Dr Hercules Salvage called to bedside. Patient had been carrying on conversation and suddenly got quiet, eyes closed and became quiet, after a few minutes patient responded to verbal stimuli. Patient had a pulse and vitals remained stable.

## 2021-05-14 NOTE — ED NOTES
TRANSFER - OUT REPORT:    Verbal report given to Abelino Jaramillo RN on [employer]  being transferred to [address] [facility] ED for routine progression of care       Report consisted of patients Situation, Background, Assessment and   Recommendations(SBAR). Information from the following report(s) SBAR, Kardex, ED Summary, OR Summary, Procedure Summary and Intake/Output was reviewed with the receiving nurse. Lines:   Peripheral IV 05/14/21 Right Wrist (Active)   Site Assessment Clean, dry, & intact 05/14/21 1534   Phlebitis Assessment 0 05/14/21 1534   Infiltration Assessment 0 05/14/21 1534   Dressing Status Clean, dry, & intact 05/14/21 1534        Opportunity for questions and clarification was provided.       Patient transported with:   O2 @ 3 liters     [facility]

## 2021-05-14 NOTE — ED NOTES
Patient arrived in ED via EMS, complaining of shortness of breath on exertion that started 2 days ago. Patient alert and oriented,  Self maintained airway. BBS crackles, mild retractions noted. Patient arrived in ED on O2 via nc at 4 lpm.  Upon arrival O2 removed and spO2 down to 86% on room air. Placed on 3 lpm.  Patient denies chest pain, abdominal pain, nausea or vomiting but states he has not had a bowel movement in 3 days.

## 2021-05-14 NOTE — ED TRIAGE NOTES
Increasing SOB/COURTNEY and decreased x 3 days with hx of afib. No SOB at  This time.  , denies pain, cough , n/v or diarrhea or urinary symptoms

## 2021-05-15 PROBLEM — I48.0 PAF (PAROXYSMAL ATRIAL FIBRILLATION) (HCC): Status: ACTIVE | Noted: 2021-05-15

## 2021-05-15 LAB
ANION GAP SERPL CALC-SCNC: 5 MMOL/L (ref 5–15)
ATRIAL RATE: 91 BPM
BUN SERPL-MCNC: 7 MG/DL (ref 6–20)
BUN/CREAT SERPL: 15 (ref 12–20)
CALCIUM SERPL-MCNC: 8.5 MG/DL (ref 8.5–10.1)
CALCULATED R AXIS, ECG10: -29 DEGREES
CALCULATED T AXIS, ECG11: -11 DEGREES
CHLORIDE SERPL-SCNC: 88 MMOL/L (ref 97–108)
CO2 SERPL-SCNC: 32 MMOL/L (ref 21–32)
CREAT SERPL-MCNC: 0.47 MG/DL (ref 0.7–1.3)
DIAGNOSIS, 93000: NORMAL
GLUCOSE SERPL-MCNC: 115 MG/DL (ref 65–100)
MAGNESIUM SERPL-MCNC: 2.2 MG/DL (ref 1.6–2.4)
OSMOLALITY UR: 400 MOSM/KG H2O
POTASSIUM SERPL-SCNC: 3.9 MMOL/L (ref 3.5–5.1)
Q-T INTERVAL, ECG07: 396 MS
QRS DURATION, ECG06: 140 MS
QTC CALCULATION (BEZET), ECG08: 487 MS
SARS-COV-2, COV2: NOT DETECTED
SODIUM SERPL-SCNC: 125 MMOL/L (ref 136–145)
SODIUM SERPL-SCNC: 131 MMOL/L (ref 136–145)
SODIUM UR-SCNC: <5 MMOL/L
SPECIMEN SOURCE, FCOV2M: NORMAL
VENTRICULAR RATE, ECG03: 91 BPM

## 2021-05-15 PROCEDURE — 74011250636 HC RX REV CODE- 250/636: Performed by: STUDENT IN AN ORGANIZED HEALTH CARE EDUCATION/TRAINING PROGRAM

## 2021-05-15 PROCEDURE — 51798 US URINE CAPACITY MEASURE: CPT

## 2021-05-15 PROCEDURE — 94640 AIRWAY INHALATION TREATMENT: CPT

## 2021-05-15 PROCEDURE — 84300 ASSAY OF URINE SODIUM: CPT

## 2021-05-15 PROCEDURE — 74011000250 HC RX REV CODE- 250: Performed by: STUDENT IN AN ORGANIZED HEALTH CARE EDUCATION/TRAINING PROGRAM

## 2021-05-15 PROCEDURE — 99223 1ST HOSP IP/OBS HIGH 75: CPT | Performed by: INTERNAL MEDICINE

## 2021-05-15 PROCEDURE — 84295 ASSAY OF SERUM SODIUM: CPT

## 2021-05-15 PROCEDURE — 83935 ASSAY OF URINE OSMOLALITY: CPT

## 2021-05-15 PROCEDURE — 65660000000 HC RM CCU STEPDOWN

## 2021-05-15 PROCEDURE — 74011000258 HC RX REV CODE- 258: Performed by: STUDENT IN AN ORGANIZED HEALTH CARE EDUCATION/TRAINING PROGRAM

## 2021-05-15 PROCEDURE — 94760 N-INVAS EAR/PLS OXIMETRY 1: CPT

## 2021-05-15 PROCEDURE — 83735 ASSAY OF MAGNESIUM: CPT

## 2021-05-15 PROCEDURE — 83970 ASSAY OF PARATHORMONE: CPT

## 2021-05-15 PROCEDURE — 74011250637 HC RX REV CODE- 250/637: Performed by: STUDENT IN AN ORGANIZED HEALTH CARE EDUCATION/TRAINING PROGRAM

## 2021-05-15 PROCEDURE — 80048 BASIC METABOLIC PNL TOTAL CA: CPT

## 2021-05-15 PROCEDURE — 74011250637 HC RX REV CODE- 250/637: Performed by: NURSE PRACTITIONER

## 2021-05-15 PROCEDURE — 36415 COLL VENOUS BLD VENIPUNCTURE: CPT

## 2021-05-15 PROCEDURE — 74011250636 HC RX REV CODE- 250/636: Performed by: NURSE PRACTITIONER

## 2021-05-15 RX ORDER — DILTIAZEM HYDROCHLORIDE 180 MG/1
180 CAPSULE, COATED, EXTENDED RELEASE ORAL DAILY
Status: DISCONTINUED | OUTPATIENT
Start: 2021-05-15 | End: 2021-05-18

## 2021-05-15 RX ORDER — SODIUM CHLORIDE 9 MG/ML
75 INJECTION, SOLUTION INTRAVENOUS CONTINUOUS
Status: DISCONTINUED | OUTPATIENT
Start: 2021-05-15 | End: 2021-05-16

## 2021-05-15 RX ADMIN — ALBUTEROL SULFATE 2 PUFF: 90 AEROSOL, METERED RESPIRATORY (INHALATION) at 21:15

## 2021-05-15 RX ADMIN — Medication 10 ML: at 16:03

## 2021-05-15 RX ADMIN — APIXABAN 2.5 MG: 2.5 TABLET, FILM COATED ORAL at 08:47

## 2021-05-15 RX ADMIN — DILTIAZEM HYDROCHLORIDE 5 MG: 5 INJECTION INTRAVENOUS at 00:21

## 2021-05-15 RX ADMIN — APIXABAN 2.5 MG: 2.5 TABLET, FILM COATED ORAL at 17:04

## 2021-05-15 RX ADMIN — Medication 10 ML: at 06:00

## 2021-05-15 RX ADMIN — CEFTRIAXONE 1 G: 1 INJECTION, POWDER, FOR SOLUTION INTRAMUSCULAR; INTRAVENOUS at 08:46

## 2021-05-15 RX ADMIN — ROSUVASTATIN CALCIUM 5 MG: 10 TABLET, COATED ORAL at 00:33

## 2021-05-15 RX ADMIN — METOPROLOL TARTRATE 50 MG: 50 TABLET, FILM COATED ORAL at 08:47

## 2021-05-15 RX ADMIN — METOPROLOL TARTRATE 50 MG: 50 TABLET, FILM COATED ORAL at 17:04

## 2021-05-15 RX ADMIN — ALBUTEROL SULFATE 2 PUFF: 90 AEROSOL, METERED RESPIRATORY (INHALATION) at 17:02

## 2021-05-15 RX ADMIN — ROSUVASTATIN CALCIUM 5 MG: 10 TABLET, COATED ORAL at 21:31

## 2021-05-15 RX ADMIN — AZITHROMYCIN MONOHYDRATE 500 MG: 500 INJECTION, POWDER, LYOPHILIZED, FOR SOLUTION INTRAVENOUS at 21:31

## 2021-05-15 RX ADMIN — Medication 10 ML: at 21:39

## 2021-05-15 RX ADMIN — SODIUM CHLORIDE 75 ML/HR: 9 INJECTION, SOLUTION INTRAVENOUS at 16:03

## 2021-05-15 RX ADMIN — DILTIAZEM HYDROCHLORIDE 180 MG: 180 CAPSULE, COATED, EXTENDED RELEASE ORAL at 08:47

## 2021-05-15 NOTE — PROGRESS NOTES
Received notification from bedside RN about patient with regards to: patient with recent order for Cardizem drip with titration after Cardizem IVP. Patient needs to transfer to stepdown for this order. Patient's current HR after Cardizem IVP in 80's-low 90's. Can stay in this unit if Cardizem is on fix rate with no titration  VS: /63, HR 94, RR 24, O2 sat 99%    Intervention given:Titratable Cardizem changed to 5mg continuos to start for HR sustained >110. Will upgrade to stepdown as needed.

## 2021-05-15 NOTE — ED NOTES
2101 Judson Veronica MD at bedside for eval;;    2128 Vivienne Perez MD at bedside for admission eval;;

## 2021-05-15 NOTE — PROGRESS NOTES
Bedside and Verbal shift change report given to Nika (oncoming nurse) by Donna Glynn (offgoing nurse). Report included the following information SBAR, Kardex, ED Summary, Procedure Summary, MAR and Recent Results.

## 2021-05-15 NOTE — ED PROVIDER NOTES
EMERGENCY DEPARTMENT HISTORY AND PHYSICAL EXAM      Date: 5/14/2021  Patient Name: Bridget Uriarte    History of Presenting Illness     Chief Complaint   Patient presents with    Transfer Of Care     ED visit d/t transfer from [facility] - dxed with PNA - arrived on 3 L NC with normal POX - A/Ox3 person place and time and situation; History Provided By: Patient    HPI: Bridget Uriarte, 80 y.o. male  With past medical history of atrial fibrillation, hypertension presenting today as a transfer from [facility] for acute respiratory failure. The patient started having some shortness of breath that has been steadily progressive over the past week. He was brought to the hospital by paramedics and had saturations in the 80s on room air. The patient was found to have bilateral infiltrates on the CT, no PE, no evidence of influenza or Covid, and was treated for both heart failure and community-acquired pneumonia and was transferred here. The patient notes that he is feeling better with the nasal cannula oxygen, and has no other complaints at this time. There are no other complaints, changes, or physical findings at this time.     PCP: Emir Ibarra MD    Current Facility-Administered Medications on File Prior to Encounter   Medication Dose Route Frequency Provider Last Rate Last Admin    [COMPLETED] cefTRIAXone (ROCEPHIN) 1 g in 0.9% sodium chloride (MBP/ADV) 50 mL MBP  1 g IntraVENous NOW Dc Jj MD   Stopped at 05/14/21 1623    [COMPLETED] furosemide (LASIX) injection 20 mg  20 mg IntraVENous NOW FELIBERTO Trujillo MD   20 mg at 05/14/21 1550    [COMPLETED] iopamidoL (ISOVUE-370) 76 % injection 100 mL  100 mL IntraVENous RAD ONCE Dc Jj MD   100 mL at 05/14/21 1733    [DISCONTINUED] sodium chloride (NS) flush 5-10 mL  5-10 mL IntraVENous ONCE Dc Jj MD        [DISCONTINUED] azithromycin (ZITHROMAX) 500 mg in 0.9% sodium chloride 250 mL IVPB  500 mg IntraVENous NOW Jamie Melchor MD        [DISCONTINUED] azithromycin (ZITHROMAX) 500 mg in 0.9% sodium chloride 250 mL (VIAL-MATE)  500 mg IntraVENous NOW Jamie Melchor MD         Current Outpatient Medications on File Prior to Encounter   Medication Sig Dispense Refill    krill oil 500 mg cap Take 1 Cap by mouth daily (with breakfast).  cholecalciferol (Vitamin D3) 25 mcg (1,000 unit) cap Take 1,000 Units by mouth daily.  dilTIAZem ER (CARDIZEM LA) 180 mg tablet Take 1 Tab by mouth daily. 30 Tab 0    rosuvastatin (CRESTOR) 5 mg tablet Take 1 Tab by mouth nightly.  metoprolol tartrate (LOPRESSOR) 50 mg tablet Take 1 Tab by mouth two (2) times a day. 60 Tab 0    apixaban (Eliquis) 2.5 mg tablet Take 1 Tab by mouth two (2) times a day. 60 Tab 0    omeprazole (PRILOSEC) 10 mg capsule Take 10 mg by mouth daily.  silodosin (RAPAFLO) 4 mg capsule Take 4 mg by mouth daily (with breakfast).  polyethylene glycol (Miralax) 17 gram/dose powder Take 17 g by mouth.  antiox #8/om3/dha/epa/lut/zeax (PRESERVISION AREDS 2, OMEGA-3, PO) Take 1 Cap by mouth two (2) times a day. Past History     Past Medical History:  Past Medical History:   Diagnosis Date    [facility] [address]     Dilated aortic root [facility] [address]     Diverticulosis     Dyslipidemia     GERD (gastroesophageal reflux disease)     HTN (hypertension)     Hx of colonic polyps     Hyponatremia 2/23/2021    Mild valvular heart disease     Prostate cancer ([facility] [address] 2014    s/p XRT (proton beam)       Past Surgical History:  Past Surgical History:   Procedure Laterality Date    HX ORTHOPAEDIC      HX UROLOGICAL         Family History:  Family History   Problem Relation Age of Onset    Stroke Father     Hypertension Father     Cancer Sister         colon    Hypertension Mother        Social History:  Social History     Tobacco Use    Smoking status: Never Smoker    Smokeless tobacco: Never Used   Substance Use Topics    Alcohol use:  No Comment: quit 20 years ago- states he was a heavy drinker    Drug use: Never       Allergies: Allergies   Allergen Reactions    Oxycodone-Acetaminophen Other (comments)     Cloudy conscious      Prednisone Other (comments)         Review of Systems   Constitutional: No  fever  Skin: No  rash  HEENT: No  nasal congestion  Resp: No cough  CV: No chest pain  GI: No vomiting  : No dysuria  MSK: No joint pain  Neuro: No numbness  Psych: No anxiety      Physical Exam     Patient Vitals for the past 12 hrs:   Temp Pulse Resp BP SpO2   05/14/21 2047 99.2 °F (37.3 °C) (!) 106 20 (!) 130/101 95 %     General: alert, No acute distress  Eyes: EOMI, normal conjunctiva  ENT: moist mucous membranes. Neck: Active, full ROM of neck. Skin: No rashes. no jaundice              Lungs: Equal chest expansion. no respiratory distress. clear to auscultation bilaterally No accessory muscle usage  Heart: regular rate     no peripheral edema   2+ radial pulses and DPs bilaterally  Abd:  non distended soft, nontender. No rebound tenderness. No guarding  Back: Full ROM  MSK: Full, active ROM in all 4 extremities.    Neuro: Alert and oriented to Person, Place, Time and Situation; normal speech;   Psych: Cooperative with exam; Appropriate mood and affect             Diagnostic Study Results     Labs -     Recent Results (from the past 12 hour(s))   BLOOD GAS, ARTERIAL    Collection Time: 05/14/21  3:20 PM   Result Value Ref Range    pH 7.37 7.35 - 7.45      PCO2 69 (H) 35 - 45 mmHg    PO2 46 (LL) 80 - 100 mmHg    O2 SAT 79 (L) 92 - 97 %    BICARBONATE 39 (H) 22 - 26 mmol/L    BASE EXCESS 10.1 mmol/L    O2 METHOD ROOM AIR      Sample source ARTERIAL      SITE RIGHT RADIAL      ABISAI'S TEST YES      Critical value read back Called to Enrique Donnelly MD on 05/14/2021 at 15:24    CBC WITH AUTOMATED DIFF    Collection Time: 05/14/21  3:21 PM   Result Value Ref Range    WBC 10.4 4.1 - 11.1 K/uL    RBC 3.73 (L) 4.10 - 5.70 M/uL    HGB 11.4 (L) 12.1 - 17.0 g/dL    HCT 34.9 (L) 36.6 - 50.3 %    MCV 93.6 80.0 - 99.0 FL    MCH 30.6 26.0 - 34.0 PG    MCHC 32.7 30.0 - 36.5 g/dL    RDW 12.7 11.5 - 14.5 %    PLATELET 502 (L) 332 - 400 K/uL    MPV 9.2 8.9 - 12.9 FL    NRBC 0.0 0  WBC    ABSOLUTE NRBC 0.00 0.00 - 0.01 K/uL    NEUTROPHILS 92 (H) 32 - 75 %    BAND NEUTROPHILS 2 %    LYMPHOCYTES 3 (L) 12 - 49 %    MONOCYTES 3 (L) 5 - 13 %    EOSINOPHILS 0 0 - 7 %    BASOPHILS 0 0 - 1 %    IMMATURE GRANULOCYTES 0 0.0 - 0.5 %    ABS. NEUTROPHILS 9.8 (H) 1.8 - 8.0 K/UL    ABS. LYMPHOCYTES 0.3 (L) 0.8 - 3.5 K/UL    ABS. MONOCYTES 0.3 0.0 - 1.0 K/UL    ABS. EOSINOPHILS 0.0 0.0 - 0.4 K/UL    ABS. BASOPHILS 0.0 0.0 - 0.1 K/UL    ABS. IMM. GRANS. 0.0 0.00 - 0.04 K/UL    DF MANUAL      PLATELET COMMENTS ADEQUATE PLATELETS      RBC COMMENTS NORMOCYTIC, NORMOCHROMIC     PROTHROMBIN TIME + INR    Collection Time: 05/14/21  3:21 PM   Result Value Ref Range    INR 1.2 (H) 0.9 - 1.1      Prothrombin time 11.6 (H) 9.0 - 82.8 sec   METABOLIC PANEL, COMPREHENSIVE    Collection Time: 05/14/21  3:21 PM   Result Value Ref Range    Sodium 129 (L) 136 - 145 mmol/L    Potassium 4.0 3.5 - 5.1 mmol/L    Chloride 89 (L) 97 - 108 mmol/L    CO2 38 (H) 21 - 32 mmol/L    Anion gap 2 (L) 5 - 15 mmol/L    Glucose 144 (H) 65 - 100 mg/dL    BUN 7 6 - 20 MG/DL    Creatinine 0.59 (L) 0.70 - 1.30 MG/DL    BUN/Creatinine ratio 12 12 - 20      GFR est AA >60 >60 ml/min/1.73m2    GFR est non-AA >60 >60 ml/min/1.73m2    Calcium 8.8 8.5 - 10.1 MG/DL    Bilirubin, total 0.6 0.2 - 1.0 MG/DL    ALT (SGPT) 44 12 - 78 U/L    AST (SGOT) 25 15 - 37 U/L    Alk.  phosphatase 73 45 - 117 U/L    Protein, total 6.6 6.4 - 8.2 g/dL    Albumin 3.4 (L) 3.5 - 5.0 g/dL    Globulin 3.2 2.0 - 4.0 g/dL    A-G Ratio 1.1 1.1 - 2.2     TROPONIN I    Collection Time: 05/14/21  3:21 PM   Result Value Ref Range    Troponin-I, Qt. <0.05 <0.05 ng/mL   MAGNESIUM    Collection Time: 05/14/21  3:21 PM   Result Value Ref Range    Magnesium 2.1 1.6 - 2.4 mg/dL   LACTIC ACID    Collection Time: 05/14/21  3:21 PM   Result Value Ref Range    Lactic acid 1.1 0.4 - 2.0 MMOL/L   NT-PRO BNP    Collection Time: 05/14/21  3:21 PM   Result Value Ref Range    NT pro-BNP 7,493 (H) 0 - 450 PG/ML   COVID-19 WITH INFLUENZA A/B    Collection Time: 05/14/21  4:13 PM   Result Value Ref Range    SARS-CoV-2 Not detected NOTD      Influenza A by PCR Not detected NOTD      Influenza B by PCR Not detected NOTD         Radiologic Studies -   No orders to display     CT Results  (Last 48 hours)               05/14/21 1731  CT HEAD WO CONT Final result    Impression:  No acute changes. Narrative:  EXAM: CT HEAD WO CONT       INDICATION: AMS       COMPARISON: None. CONTRAST: None. TECHNIQUE: Unenhanced CT of the head was performed using 5 mm images. Brain and   bone windows were generated. Coronal and sagittal reformats. CT dose reduction   was achieved through use of a standardized protocol tailored for this   examination and automatic exposure control for dose modulation. FINDINGS:       There is no extra-axial fluid collection, hemorrhage or shift. Mild atrophy and nonspecific white matter changes. 05/14/21 1731  CTA CHEST W OR W WO CONT Final result    Impression:  Bibasal infiltrates , no evidence for pulmonary emboli. Narrative:  Clinical indication: Hypoxia, shortness of breath. Localizer obtained without contrast at the level of the pulmonary arteries. Fast   injection rate of 100 cc of Isovue-370 with review of the raw data and MIP   reconstructions. Comparison December 27, 2018. INTERPRETATION PROVIDED FOR   COMPLIANCE ONLY AT NO CHARGE        . Bibasilar infiltrate. No definite evidence for pulmonary emboli. Chronic   elevation of the right hemidiaphragm. No masses or adenopathy. No pericardial   pleural effusion no shift or pneumothorax.                        CXR Results  (Last 48 hours)               05/14/21 1516  XR CHEST PORT Final result    Impression:  Bibasilar patchy opacification. Narrative:  EXAM: XR CHEST PORT       INDICATION: SOB       COMPARISON: January 29       FINDINGS: A portable AP radiograph of the chest was obtained at 1509 hours. The   patient is on a cardiac monitor. There is patchy opacification in the lower   lobes bilaterally. There is atherosclerosis of the aorta. The bones and soft   tissues are grossly within normal limits. Medical Decision Making   I am the first provider for this patient. I reviewed the vital signs, available nursing notes, past medical history, past surgical history, family history and social history. Vital Signs-Reviewed the patient's vital signs. Patient Vitals for the past 12 hrs:   Temp Pulse Resp BP SpO2   05/14/21 2047 99.2 °F (37.3 °C) (!) 106 20 (!) 130/101 95 %       Provider Notes (Medical Decision Making):     Differential Diagnosis: Heart failure exacerbation, pneumonia, electrolyte derangement    Initial Plan: Patient transferred here with hypoxic respiratory failure, treated with Lasix and Rocephin and azithromycin. The patient has oxygen saturation of 93% on 3 L. ED Course:   Initial assessment performed. The patients presenting problems have been discussed, and they are in agreement with the care plan formulated and outlined with them. I have encouraged them to ask questions as they arise throughout their visit. ED Course as of May 14 2120   Fri May 14, 2021   2119 On my interpretation of the patient's outside records the patient has evidence of bilateral infiltrates, elevated cardiac BNP. The patient had already been treated with azithromycin, ceftriaxone, and Lasix. He is comfortable on his 3 L nasal cannula. Ultimately is admitted to the hospitalist service. [NW]      ED Course User Index  [NW] Leeann Griggs MD       I, Rachana Rojo MD, am the attending of record for this patient encounter.       Disposition: Admitted    Admission Note:  Patient is being admitted to the hospital by Service: Hospitalist.  The results of their tests and reasons for their admission have been discussed with them and available family. They convey agreement and understanding for the need to be admitted and for their admission diagnosis. Diagnosis     Clinical Impression:   1. Acute respiratory failure with hypoxia ([facility] [address]    2. Acute congestive heart failure, unspecified heart failure type [facility])        Attestations:    Yann Shepard MD    Please note that this dictation was completed with [employer], the computer voice recognition software. Quite often unanticipated grammatical, syntax, homophones, and other interpretive errors are inadvertently transcribed by the computer software. Please disregard these errors. Please excuse any errors that have escaped final proofreading. Thank you.

## 2021-05-15 NOTE — PROGRESS NOTES
I reviewed pertinent labs and imaging, and discussed /agreed on the plan of care with Dr. Franco Guerrero      Hospitalist Progress Note    NAME: Omid Alamo   :  10/19/1926   MRN:  [id number]       Assessment / Plan:  1200 HR 71 after morning meds   1500 Urine osmo 400 Ur sodium <5 will Dc fluid restriction , start NS 75 ml   Recheck lab in 2 hrs     Acute hypoxic respiratory failure  Community-acquired pneumonia  - 2L NC sats at 94%   - cont albuterol inhaler   - cont rocephin   - BC NGTD   - if COVID neg add neb tx     A. fib with RVR  Diastolic CHF exacerbation with preserved ejection fraction  - -115 on admission , rec'd IV dilt in ER   - -113 now gtt not started will resume home med Cardizem 180 mg     And metoprolol 50 mg   - HR to 120s with exertion then returns to 117   -  BNP 7493 ec'd IV lasix overnight  - cont Eliquis     Acute on chronic Hyponatremia  - Baseline looks like 130s   - today at 125   -confused   - 1500 ml fluid restriction , hold lasix today   - send Urine osmolarity and urine sodium     Hypertension  GERD  History of prostate cancer  Hyperlipidemia  Continue the home medications.   /     Estimated discharge date: May 17  Barriers:    Code status: Full  Prophylaxis: Lovenox  Recommended Disposition: Home w/Family     Subjective:     Chief Complaint / Reason for Physician Visit  Pt restless this morning attempting to get out of bed . Easily redirectd back to bed . Courtney noted with HR to 120s then returned to 107 at rest . Will reassesses once meds are given    Review of Systems:  Symptom Y/N Comments  Symptom Y/N Comments   Fever/Chills    Chest Pain     Poor Appetite    Edema     Cough    Abdominal Pain     Sputum    Joint Pain     SOB/COURTNEY    Pruritis/Rash     Nausea/vomit    Tolerating PT/OT     Diarrhea    Tolerating Diet     Constipation    Other       Could NOT obtain due to: confused     Objective:     VITALS:   Last 24hrs VS reviewed since prior progress note.  Most recent are:  Patient Vitals for the past 24 hrs:   Temp Pulse Resp BP SpO2   05/15/21 0611  89      05/15/21 0530  (!) 107      05/15/21 0500  (!) 105      05/15/21 0430  99      05/15/21 0400  85      05/15/21 0330  98      05/15/21 0258 97.9 °F (36.6 °C) 86 22 (!) 114/58 99 %   05/15/21 0016  (!) 115      05/15/21 0015  (!) 110      05/14/21 2240 98.5 °F (36.9 °C) 94 24 114/63 99 %   05/14/21 2145  (!) 107 23 (!) 126/109 99 %   05/14/21 2100  (!) 108 25 127/85 93 %   05/14/21 2047 99.2 °F (37.3 °C) (!) 106 20 (!) 130/101 95 %     No intake or output data in the 24 hours ending 05/15/21 0818     I had a face to face encounter and independently examined this patient on 5/15/2021, as outlined below:  PHYSICAL EXAM:  General: Thin Alert, confused no acute distress    EENT:  EOMI. Anicteric sclerae. MMM missing teeth   Resp:            no wheezing or rales. No accessory muscle use COURTNEY   CV:  Tachyccardia  rhythm,  No edema  GI:  Soft, Non distended, Non tender. +Bowel sounds  Neurologic:  Alert and oriented X 1, normal speech,   Psych:   t. Not anxious nor agitated  Skin:  No rashes. No jaundice    Reviewed most current lab test results and cultures  YES  Reviewed most current radiology test results   YES  Review and summation of old records today    NO  Reviewed patient's current orders and MAR    YES  PMH/ reviewed - no change compared to H&P  ________________________________________________________________________  Care Plan discussed with:    Comments   Patient x    Family      RN x    Care Manager     Consultant                        Multidiciplinary team rounds were held today with , nursing, pharmacist and clinical coordinator. Patient's plan of care was discussed; medications were reviewed and discharge planning was addressed.      ________________________________________________________________________  Total NON critical care TIME:  30  Minutes    Total CRITICAL CARE TIME Spent:   Minutes non procedure based      Comments   >50% of visit spent in counseling and coordination of care     ________________________________________________________________________  Slick Adam NP     Procedures: see electronic medical records for all procedures/Xrays and details which were not copied into this note but were reviewed prior to creation of Plan. LABS:  I reviewed today's most current labs and imaging studies. Pertinent labs include:  Recent Labs     05/14/21  1521   WBC 10.4   HGB 11.4*   HCT 34.9*   *     Recent Labs     05/15/21  0316 05/14/21  1521   * 129*   K 3.9 4.0   CL 88* 89*   CO2 32 38*   * 144*   BUN 7 7   CREA 0.47* 0.59*   CA 8.5 8.8   MG 2.2 2.1   ALB  --  3.4*   TBILI  --  0.6   ALT  --  44   INR  --  1.2*       Signed: Hanna Adam NP

## 2021-05-15 NOTE — H&P
Hospitalist Admission Note    NAME: Jonathan Page   :  10/19/1926   MRN:  [id number]     Date/Time:  2021 10:00 PM    Patient PCP: Andie Chirinos MD  ______________________________________________________________________  Given the patient's current clinical presentation, I have a high level of concern for decompensation if discharged from the emergency department. Complex decision making was performed, which includes reviewing the patient's available past medical records, laboratory results, and x-ray films. My assessment of this patient's clinical condition and my plan of care is as follows. Assessment / Plan:      Acute hypoxic respiratory failure  A. fib with RVR  Diastolic CHF exacerbation with preserved ejection fraction  Community-acquired pneumonia  CTA chestBibasal infiltrates , no evidence for pulmonary emboli. Chest x-rayBibasilar patchy opacification. CT headno acute abnormalities  Troponin <0.05, lactic acid 1.1, BNP 7493. Rapid Covid negative, PCR pending. ABG7.37/69/46/39 on room air  Started on Rocephin azithromycin, albuterol 4 times daily. Got Lasix at the [facility], start on Lasix 40 mg IV daily. Started on diltiazem drip after giving diltiazem bolus. Continue metoprolol 50 mg twice daily. Cardiology consulted. Eliquis 2.5 mg twice daily  Currently on 3 L nasal cannula. Patient history of A. fib, as per the patient well controlled with the oral medications. Patient sees Dr. Tabitha Gilman on the outpatient basis. Echo in F 65 to 28%, grade 1 diastolic dysfunction, severely dilated left atrium. Acute on chronic hyponatremia  Sodium 129, BMP in a.m. Hypertension  GERD  History of prostate cancer  Hyperlipidemia  Continue the home medications.     Code Status: Full code    DVT Prophylaxis: Eliquis  GI Prophylaxis: not indicated    Baseline: Ambulatory with assistance at home      Subjective:   CHIEF COMPLAINT: Shortness of breath    HISTORY OF PRESENT ILLNESS:     Clay Posey is a 80 y.o.  male who presents with shortness of breath. Past medical history of hypertension, GERD, prostate cancer, hyperlipidemia, A. fib, diastolic CHF. Patient complains of shortness of breath getting progressively worse over the last couple of days, last night he was not able to sleep. Patient also stated he has A. fib and recently the rate is not controlled with the oral medications. Patient [facility] to [facility] with the complaints and was transferred here because of the acute respiratory failure. Currently the patient is on 3 L nasal cannula. Patient complains of chest congestion but no cough, no fever and chills, no chest pain, no nausea vomiting, no abdominal pain, no history of sick contacts. We were asked to admit for work up and evaluation of the above problems. Past Medical History:   Diagnosis Date    [facility]ib ([facility]     Dilated aortic root (HCC)     Diverticulosis     Dyslipidemia     GERD (gastroesophageal reflux disease)     HTN (hypertension)     Hx of colonic polyps     Hyponatremia 2/23/2021    Mild valvular heart disease     Prostate cancer ([facility] 2014    s/p XRT (proton beam)        Past Surgical History:   Procedure Laterality Date    HX ORTHOPAEDIC      HX UROLOGICAL         Social History     Tobacco Use    Smoking status: Never Smoker    Smokeless tobacco: Never Used   Substance Use Topics    Alcohol use: No     Comment: quit 20 years ago- states he was a heavy drinker        Family History   Problem Relation Age of Onset   Frankie German Stroke Father     Hypertension Father     Cancer Sister         colon    Hypertension Mother      Allergies   Allergen Reactions    Oxycodone-Acetaminophen Other (comments)     Cloudy conscious      Prednisone Other (comments)        Prior to Admission medications    Medication Sig Start Date End Date Taking?  Authorizing Provider   krill oil 500 mg cap Take 1 Cap by mouth daily (with breakfast). Yes Zina, MD Angi   cholecalciferol (Vitamin D3) 25 mcg (1,000 unit) cap Take 1,000 Units by mouth daily. Yes Zina, MD Angi   dilTIAZem ER (CARDIZEM LA) 180 mg tablet Take 1 Tab by mouth daily. 1/30/21  Yes Ny Hernandez MD   rosuvastatin (CRESTOR) 5 mg tablet Take 1 Tab by mouth nightly. 1/18/21  Yes Provider, Historical   metoprolol tartrate (LOPRESSOR) 50 mg tablet Take 1 Tab by mouth two (2) times a day. 1/19/21  Yes Armando Thomas MD   apixaban (Eliquis) 2.5 mg tablet Take 1 Tab by mouth two (2) times a day. 1/19/21  Yes Armando Thomas MD   omeprazole (PRILOSEC) 10 mg capsule Take 10 mg by mouth daily. Other, MD Angi   silodosin (RAPAFLO) 4 mg capsule Take 4 mg by mouth daily (with breakfast). Other, MD Angi   polyethylene glycol (Miralax) 17 gram/dose powder Take 17 g by mouth. Provider, Historical   antiox #8/om3/dha/epa/lut/zeax (PRESERVISION AREDS 2, OMEGA-3, PO) Take 1 Cap by mouth two (2) times a day. Provider, Historical       REVIEW OF SYSTEMS:     I am not able to complete the review of systems because:    The patient is intubated and sedated    The patient has altered mental status due to his acute medical problems    The patient has baseline aphasia from prior stroke(s)    The patient has baseline dementia and is not reliable historian    The patient is in acute medical distress and unable to provide information           Total of 12 systems reviewed as follows:       POSITIVE= underlined text  Negative = text not underlined  General:  fever, chills, sweats, generalized weakness, weight loss/gain,      loss of appetite   Eyes:    blurred vision, eye pain, loss of vision, double vision  ENT:    rhinorrhea, pharyngitis   Respiratory:   cough, sputum production, SOB, COURTNEY, wheezing, pleuritic pain   Cardiology:   chest pain, palpitations, orthopnea, PND, edema, syncope   Gastrointestinal:  abdominal pain , N/V, diarrhea, dysphagia, constipation, bleeding   Genitourinary:  frequency, urgency, dysuria, hematuria, incontinence   Muskuloskeletal :  arthralgia, myalgia, back pain  Hematology:  easy bruising, nose or gum bleeding, lymphadenopathy   Dermatological: rash, ulceration, pruritis, color change / jaundice  Endocrine:   hot flashes or polydipsia   Neurological:  headache, dizziness, confusion, focal weakness, paresthesia,     Speech difficulties, memory loss, gait difficulty  Psychological: Feelings of anxiety, depression, agitation    Objective:   VITALS:    Visit Vitals  BP (!) 126/109   Pulse (!) 107   Temp 99.2 °F (37.3 °C)   Resp 23   Ht 5' 9\" (1.753 m)   Wt 61.7 kg (136 lb 0.4 oz)   SpO2 99%   BMI 20.09 kg/m²       PHYSICAL EXAM:    General:    Alert, cooperative, no distress, appears stated age. HEENT: Atraumatic, anicteric sclerae, pink conjunctivae     No oral ulcers, mucosa moist, throat clear, dentition fair  Neck:  Supple, symmetrical,  thyroid: non tender  Lungs:   Diminished breath sound bilaterally, rhonchi lower lobes. No rales. Chest wall:  No tenderness  No Accessory muscle use. Heart:   Irregular rhythm,  No  murmur   No edema  Abdomen:   Soft, non-tender. Not distended. Bowel sounds normal  Extremities: No cyanosis. No clubbing,      Skin turgor normal, Capillary refill normal, Radial dial pulse 2+  Skin:     Not pale. Not Jaundiced  No rashes   Psych:  Good insight. Not depressed. Not anxious or agitated. Neurologic: EOMs intact. No facial asymmetry. No aphasia or slurred speech. Symmetrical strength, Sensation grossly intact.  Alert and oriented X 4.     _______________________________________________________________________  Care Plan discussed with:    Comments   Patient X    Family      RN X    Care Manager                    Consultant:  X    _______________________________________________________________________  Expected  Disposition:   Home with Family    HH/PT/OT/RN X   SNF/LTC    KAMRAN ________________________________________________________________________  TOTAL TIME:  60 Minutes    Critical Care Provided     Minutes non procedure based      Comments     Reviewed previous records   >50% of visit spent in counseling and coordination of care  Discussion with patient and/or family and questions answered       ________________________________________________________________________  Signed: Brandy Hogan MD    Procedures: see electronic medical records for all procedures/Xrays and details which were not copied into this note but were reviewed prior to creation of Plan. LAB DATA REVIEWED:    Recent Results (from the past 24 hour(s))   BLOOD GAS, ARTERIAL    Collection Time: 05/14/21  3:20 PM   Result Value Ref Range    pH 7.37 7.35 - 7.45      PCO2 69 (H) 35 - 45 mmHg    PO2 46 (LL) 80 - 100 mmHg    O2 SAT 79 (L) 92 - 97 %    BICARBONATE 39 (H) 22 - 26 mmol/L    BASE EXCESS 10.1 mmol/L    O2 METHOD ROOM AIR      Sample source ARTERIAL      SITE RIGHT RADIAL      ABISAI'S TEST YES      Critical value read back Called to Enrique Donnelly MD on 05/14/2021 at 15:24    CBC WITH AUTOMATED DIFF    Collection Time: 05/14/21  3:21 PM   Result Value Ref Range    WBC 10.4 4.1 - 11.1 K/uL    RBC 3.73 (L) 4.10 - 5.70 M/uL    HGB 11.4 (L) 12.1 - 17.0 g/dL    HCT 34.9 (L) 36.6 - 50.3 %    MCV 93.6 80.0 - 99.0 FL    MCH 30.6 26.0 - 34.0 PG    MCHC 32.7 30.0 - 36.5 g/dL    RDW 12.7 11.5 - 14.5 %    PLATELET 739 (L) 434 - 400 K/uL    MPV 9.2 8.9 - 12.9 FL    NRBC 0.0 0  WBC    ABSOLUTE NRBC 0.00 0.00 - 0.01 K/uL    NEUTROPHILS 92 (H) 32 - 75 %    BAND NEUTROPHILS 2 %    LYMPHOCYTES 3 (L) 12 - 49 %    MONOCYTES 3 (L) 5 - 13 %    EOSINOPHILS 0 0 - 7 %    BASOPHILS 0 0 - 1 %    IMMATURE GRANULOCYTES 0 0.0 - 0.5 %    ABS. NEUTROPHILS 9.8 (H) 1.8 - 8.0 K/UL    ABS. LYMPHOCYTES 0.3 (L) 0.8 - 3.5 K/UL    ABS. MONOCYTES 0.3 0.0 - 1.0 K/UL    ABS. EOSINOPHILS 0.0 0.0 - 0.4 K/UL    ABS. BASOPHILS 0.0 0.0 - 0.1 K/UL    ABS. IMM. GRANS. 0.0 0.00 - 0.04 K/UL    DF MANUAL      PLATELET COMMENTS ADEQUATE PLATELETS      RBC COMMENTS NORMOCYTIC, NORMOCHROMIC     PROTHROMBIN TIME + INR    Collection Time: 05/14/21  3:21 PM   Result Value Ref Range    INR 1.2 (H) 0.9 - 1.1      Prothrombin time 11.6 (H) 9.0 - 70.1 sec   METABOLIC PANEL, COMPREHENSIVE    Collection Time: 05/14/21  3:21 PM   Result Value Ref Range    Sodium 129 (L) 136 - 145 mmol/L    Potassium 4.0 3.5 - 5.1 mmol/L    Chloride 89 (L) 97 - 108 mmol/L    CO2 38 (H) 21 - 32 mmol/L    Anion gap 2 (L) 5 - 15 mmol/L    Glucose 144 (H) 65 - 100 mg/dL    BUN 7 6 - 20 MG/DL    Creatinine 0.59 (L) 0.70 - 1.30 MG/DL    BUN/Creatinine ratio 12 12 - 20      GFR est AA >60 >60 ml/min/1.73m2    GFR est non-AA >60 >60 ml/min/1.73m2    Calcium 8.8 8.5 - 10.1 MG/DL    Bilirubin, total 0.6 0.2 - 1.0 MG/DL    ALT (SGPT) 44 12 - 78 U/L    AST (SGOT) 25 15 - 37 U/L    Alk.  phosphatase 73 45 - 117 U/L    Protein, total 6.6 6.4 - 8.2 g/dL    Albumin 3.4 (L) 3.5 - 5.0 g/dL    Globulin 3.2 2.0 - 4.0 g/dL    A-G Ratio 1.1 1.1 - 2.2     TROPONIN I    Collection Time: 05/14/21  3:21 PM   Result Value Ref Range    Troponin-I, Qt. <0.05 <0.05 ng/mL   MAGNESIUM    Collection Time: 05/14/21  3:21 PM   Result Value Ref Range    Magnesium 2.1 1.6 - 2.4 mg/dL   LACTIC ACID    Collection Time: 05/14/21  3:21 PM   Result Value Ref Range    Lactic acid 1.1 0.4 - 2.0 MMOL/L   NT-PRO BNP    Collection Time: 05/14/21  3:21 PM   Result Value Ref Range    NT pro-BNP 7,493 (H) 0 - 450 PG/ML   COVID-19 WITH INFLUENZA A/B    Collection Time: 05/14/21  4:13 PM   Result Value Ref Range    SARS-CoV-2 Not detected NOTD      Influenza A by PCR Not detected NOTD      Influenza B by PCR Not detected NOTD     SARS-COV-2    Collection Time: 05/14/21  9:39 PM   Result Value Ref Range    SARS-CoV-2 Please find results under separate order

## 2021-05-15 NOTE — CONSULTS
CARDIOLOGY CONSULT       Date of  Admission: 5/14/2021  8:30 PM     Admission type:Emergency    Consult for:  PAF  Consulted by: Dr. Brenda Gamez     Subjective:     Evan Alas is a 80 y.o. male admitted [facility] [J96.01];[facility]F exacerbation ([facility] [I50.9];CAP (community acquired pneumonia) [J18.9]. We are consulted for paroxysmal atrial fibrillation with slightly rapid ventricular response. He is known to Dr. Alexis Sarkar, and is on p.o. diltiazem and Eliquis at baseline. Last EKG in the office showed sinus rhythm. Today he is in atrial fibrillation, with controlled ventricular rate after a bolus of IV diltiazem. Since heart rate came under control, it was decided not to start a diltiazem drip. The patient has shortness of breath but denies chest pain, orthopnea, PND, LE edema, palpitations, syncope, or presyncope.       Patient Active Problem List    Diagnosis Date Noted    PAF (paroxysmal atrial fibrillation) ([facility] 05/15/2021    CAP (community acquired pneumonia) 05/14/2021    [facility]ation [facility] 05/14/2021    Acute hypoxemic respiratory failure ([facility] 05/14/2021    Hyponatremia 02/23/2021    Dilated aortic root (HCC)     Mild valvular heart disease     Dyslipidemia     GERD (gastroesophageal reflux disease)     Diverticulosis of large intestine without hemorrhage 08/24/2017    History of total right knee replacement 08/24/2017    Gallstones 08/24/2017    History of atrial fibrillation 08/24/2017    Hypertension 05/31/2014    [facility] ([facility] 05/31/2014      Ravinder Avelar MD  Past Medical History:   Diagnosis Date    Afib [facility])     Dilated aortic root ([facility]     Diverticulosis     Dyslipidemia     GERD (gastroesophageal reflux disease)     HTN (hypertension)     Hx of colonic polyps     Hyponatremia 2/23/2021    Mild valvular heart disease     Prostate cancer ([facility] 2014    s/p XRT (proton beam)       Social History     Socioeconomic History    Marital status: SINGLE     Spouse name: Not on file    Number of children: Not on file    Years of education: Not on file    Highest education level: Not on file   Tobacco Use    Smoking status: Never Smoker    Smokeless tobacco: Never Used   Substance and Sexual Activity    Alcohol use: No     Comment: quit 20 years ago- states he was a heavy drinker    Drug use: Never    Sexual activity: Not Currently   Other Topics Concern     Service Yes    Blood Transfusions No    Caffeine Concern No    Occupational Exposure No    Hobby Hazards No    Sleep Concern No    Stress Concern No    Weight Concern No    Special Diet No    Back Care No    Exercise No    Bike Helmet No    Seat Belt Yes    Self-Exams Yes     Allergies   Allergen Reactions    Oxycodone-Acetaminophen Other (comments)     Cloudy conscious      Prednisone Other (comments)      Family History   Problem Relation Age of Onset    Stroke Father     Hypertension Father     Cancer Sister         colon    Hypertension Mother       Current Facility-Administered Medications   Medication Dose Route Frequency    [Held by provider] dilTIAZem (CARDIZEM) 100 mg in dextrose 5% (MBP/ADV) 100 mL infusion  5 mg/hr IntraVENous PRN    dilTIAZem ER (CARDIZEM CD) capsule 180 mg  180 mg Oral DAILY    apixaban (ELIQUIS) tablet 2.5 mg  2.5 mg Oral BID    metoprolol tartrate (LOPRESSOR) tablet 50 mg  50 mg Oral BID    rosuvastatin (CRESTOR) tablet 5 mg  5 mg Oral QHS    azithromycin (ZITHROMAX) 500 mg in 0.9% sodium chloride 250 mL (VIAL-MATE)  500 mg IntraVENous Q24H    albuterol (PROVENTIL HFA, VENTOLIN HFA, PROAIR HFA) inhaler 2 Puff  2 Puff Inhalation QID RT    cefTRIAXone (ROCEPHIN) 1 g in 0.9% sodium chloride (MBP/ADV) 50 mL MBP  1 g IntraVENous Q24H    sodium chloride (NS) flush 5-40 mL  5-40 mL IntraVENous Q8H    sodium chloride (NS) flush 5-40 mL  5-40 mL IntraVENous PRN    polyethylene glycol (MIRALAX) packet 17 g  17 g Oral DAILY PRN    ondansetron (ZOFRAN ODT) tablet 4 mg  4 mg Oral Q8H PRN    Or    ondansetron (ZOFRAN) injection 4 mg  4 mg IntraVENous Q6H PRN    [Held by provider] furosemide (LASIX) injection 40 mg  40 mg IntraVENous DAILY         Review of Symptoms:  11 systems reviewed, negative other than as stated in HPI     Subjective:        Visit Vitals  /77   Pulse (!) 103   Temp 98 °F (36.7 °C)   Resp 22   Ht 5' 9\" (1.753 m)   Wt 136 lb 0.4 oz (61.7 kg)   SpO2 93%   BMI 20.09 kg/m²       Physical:  Gen:  NAD, sleepy, very thin appears malnourished  Heart: irregular rhythm, no murmur, gallop or rub  Lungs: Grossly clear to auscultation bilaterally  Neck: supple, symmetrical, trachea midline, no adenopathy, thyroid: not enlarged, symmetric, no tenderness/mass/nodules, no carotid bruit and no JVD  Abdomen: soft, non-tender. Bowel sounds normal. No masses,  no organomegaly  Extremities: extremities normal, atraumatic, no cyanosis or edema, positive femorals without bruits, normal dp pulses  Neurologic: CN, motor and speech grossly normal    Data Review:   Recent Labs     05/14/21  1521   WBC 10.4   HGB 11.4*   HCT 34.9*   *     Recent Labs     05/15/21  0316 05/14/21  1521   * 129*   K 3.9 4.0   CL 88* 89*   CO2 32 38*   * 144*   BUN 7 7   CREA 0.47* 0.59*   CA 8.5 8.8   MG 2.2 2.1   ALB  --  3.4*   TBILI  --  0.6   ALT  --  44   INR  --  1.2*       Recent Labs     05/14/21  1521   TROIQ <0.05       No results found for: CHOL, CHOLPOCT, CHOLX, CHLST, CHOLV, HDL, HDLPOC, HDLP, LDL, LDLCPOC, LDLC, DLDLP, VLDLC, VLDL, TGLX, TRIGL, TRIGP, TGLPOCT, CHHD, CHHDX      No intake or output data in the 24 hours ending 05/15/21 1053     Cardiographics    Telemetry: AFIB    ECG:   Atrial fibrillation   Right bundle branch block     Echocardiogram 2/17/21:  · LV: Estimated LVEF is 65 - 70%. Visually measured ejection fraction. Normal cavity size and systolic function (ejection fraction normal).  Upper normal wall thickness. Mild (grade 1) left ventricular diastolic dysfunction. · LA: Severely dilated left atrium. Left Atrium volume index is 45 mL/m2. · AV: Mild aortic valve regurgitation is present. · MV: Mild mitral valve regurgitation is present. · TV: Mild tricuspid valve regurgitation is present. · PV: Mild pulmonic valve regurgitation is present. · AO: Mild aortic root dilatation. Aortic root diameter = 4.5 cm. Assessment:       Principal Problem:    CAP (community acquired pneumonia) (5/14/2021)    Active Problems:    Hypertension (5/31/2014)      Dyslipidemia ()      CHF exacerbation ([facility] (5/14/2021)      [facility] (5/14/2021)      PAF (paroxysmal atrial fibrillation) ([facility] (5/15/2021)         Plan:     Obdulio Daniel is a 80 y.o. male admitted for Acute hypoxemic respiratory failure ([facility] [J96.01];[facility] exacerbation ([facility] [I50.9];CAP (community acquired pneumonia) [J18.9]. We are consulted for paroxysmal atrial fibrillation with slightly rapid ventricular response. He is known to Dr. Shoaib Reardon, and is on p.o. diltiazem and Eliquis at baseline. Last EKG in the office showed sinus rhythm. Today he is in atrial fibrillation, with controlled ventricular rate after a bolus of IV diltiazem. Since heart rate came under control, it was decided not to start a diltiazem drip. Paroxysmal atrial fibrillation:  Rate currently controlled. Continue PO diltiazem and Eliquis. Possible CHF:  LVEF normal, cannot rule out component of diastolic heart failure. Diuretic currently held due to fairly good oxygenation and significant hyponatremia. Monitor. proBNP is elevated at 7000+. Suspected community-acquired pneumonia:  As per internal medicine. Check procalcitonin. If entirely normal, may argue for acute heart failure with preserved ejection fraction greater than pneumonia. Hyponatremia:  As per internal medicine. Holding Lasix currently.     Dyslipidemia:  Continue statin    Hypertension:  Currently well controlled. Thanks for the consult. I appreciate the opportunity to participate in this patient's care.

## 2021-05-16 ENCOUNTER — APPOINTMENT (OUTPATIENT)
Dept: NON INVASIVE DIAGNOSTICS | Age: 86
DRG: 193 | End: 2021-05-16
Attending: INTERNAL MEDICINE
Payer: MEDICARE

## 2021-05-16 LAB
ANION GAP SERPL CALC-SCNC: 0 MMOL/L (ref 5–15)
BUN SERPL-MCNC: 10 MG/DL (ref 6–20)
BUN/CREAT SERPL: 20 (ref 12–20)
CALCIUM SERPL-MCNC: 8.7 MG/DL (ref 8.5–10.1)
CALCIUM SERPL-MCNC: 9 MG/DL (ref 8.5–10.1)
CHLORIDE SERPL-SCNC: 93 MMOL/L (ref 97–108)
CO2 SERPL-SCNC: 40 MMOL/L (ref 21–32)
CREAT SERPL-MCNC: 0.49 MG/DL (ref 0.7–1.3)
ERYTHROCYTE [DISTWIDTH] IN BLOOD BY AUTOMATED COUNT: 12.8 % (ref 11.5–14.5)
GLUCOSE SERPL-MCNC: 113 MG/DL (ref 65–100)
HCT VFR BLD AUTO: 40 % (ref 36.6–50.3)
HGB BLD-MCNC: 12.1 G/DL (ref 12.1–17)
MCH RBC QN AUTO: 30.9 PG (ref 26–34)
MCHC RBC AUTO-ENTMCNC: 30.3 G/DL (ref 30–36.5)
MCV RBC AUTO: 102 FL (ref 80–99)
NRBC # BLD: 0 K/UL (ref 0–0.01)
NRBC BLD-RTO: 0 PER 100 WBC
PLATELET # BLD AUTO: 156 K/UL (ref 150–400)
PMV BLD AUTO: 9.4 FL (ref 8.9–12.9)
POTASSIUM SERPL-SCNC: 4 MMOL/L (ref 3.5–5.1)
PROCALCITONIN SERPL-MCNC: 0.15 NG/ML
PTH-INTACT SERPL-MCNC: 67.8 PG/ML (ref 18.4–88)
RBC # BLD AUTO: 3.92 M/UL (ref 4.1–5.7)
SODIUM SERPL-SCNC: 133 MMOL/L (ref 136–145)
WBC # BLD AUTO: 6.6 K/UL (ref 4.1–11.1)

## 2021-05-16 PROCEDURE — 74011250636 HC RX REV CODE- 250/636: Performed by: NURSE PRACTITIONER

## 2021-05-16 PROCEDURE — 36415 COLL VENOUS BLD VENIPUNCTURE: CPT

## 2021-05-16 PROCEDURE — 77010033678 HC OXYGEN DAILY

## 2021-05-16 PROCEDURE — 74011250637 HC RX REV CODE- 250/637: Performed by: STUDENT IN AN ORGANIZED HEALTH CARE EDUCATION/TRAINING PROGRAM

## 2021-05-16 PROCEDURE — 84145 PROCALCITONIN (PCT): CPT

## 2021-05-16 PROCEDURE — 99233 SBSQ HOSP IP/OBS HIGH 50: CPT | Performed by: INTERNAL MEDICINE

## 2021-05-16 PROCEDURE — 74011250637 HC RX REV CODE- 250/637: Performed by: INTERNAL MEDICINE

## 2021-05-16 PROCEDURE — 85027 COMPLETE CBC AUTOMATED: CPT

## 2021-05-16 PROCEDURE — 74011000258 HC RX REV CODE- 258: Performed by: STUDENT IN AN ORGANIZED HEALTH CARE EDUCATION/TRAINING PROGRAM

## 2021-05-16 PROCEDURE — 74011250636 HC RX REV CODE- 250/636: Performed by: STUDENT IN AN ORGANIZED HEALTH CARE EDUCATION/TRAINING PROGRAM

## 2021-05-16 PROCEDURE — 65660000000 HC RM CCU STEPDOWN

## 2021-05-16 PROCEDURE — 74011250637 HC RX REV CODE- 250/637: Performed by: NURSE PRACTITIONER

## 2021-05-16 PROCEDURE — 74011250636 HC RX REV CODE- 250/636: Performed by: INTERNAL MEDICINE

## 2021-05-16 PROCEDURE — 94760 N-INVAS EAR/PLS OXIMETRY 1: CPT

## 2021-05-16 PROCEDURE — 80048 BASIC METABOLIC PNL TOTAL CA: CPT

## 2021-05-16 PROCEDURE — 93306 TTE W/DOPPLER COMPLETE: CPT

## 2021-05-16 RX ORDER — LEVALBUTEROL INHALATION SOLUTION 0.63 MG/3ML
0.63 SOLUTION RESPIRATORY (INHALATION)
Status: DISCONTINUED | OUTPATIENT
Start: 2021-05-16 | End: 2021-05-16

## 2021-05-16 RX ORDER — FUROSEMIDE 40 MG/1
20 TABLET ORAL DAILY
Status: DISCONTINUED | OUTPATIENT
Start: 2021-05-17 | End: 2021-05-19

## 2021-05-16 RX ORDER — ALBUTEROL SULFATE 90 UG/1
2 AEROSOL, METERED RESPIRATORY (INHALATION)
Status: DISCONTINUED | OUTPATIENT
Start: 2021-05-16 | End: 2021-05-16

## 2021-05-16 RX ORDER — FUROSEMIDE 40 MG/1
40 TABLET ORAL
Status: COMPLETED | OUTPATIENT
Start: 2021-05-16 | End: 2021-05-16

## 2021-05-16 RX ORDER — SODIUM CHLORIDE 9 MG/ML
100 INJECTION, SOLUTION INTRAVENOUS CONTINUOUS
Status: DISCONTINUED | OUTPATIENT
Start: 2021-05-16 | End: 2021-05-16

## 2021-05-16 RX ORDER — LEVALBUTEROL INHALATION SOLUTION 0.63 MG/3ML
0.63 SOLUTION RESPIRATORY (INHALATION)
Status: DISCONTINUED | OUTPATIENT
Start: 2021-05-16 | End: 2021-05-20 | Stop reason: HOSPADM

## 2021-05-16 RX ADMIN — POLYETHYLENE GLYCOL 3350 17 G: 17 POWDER, FOR SOLUTION ORAL at 13:59

## 2021-05-16 RX ADMIN — METOPROLOL TARTRATE 50 MG: 50 TABLET, FILM COATED ORAL at 09:15

## 2021-05-16 RX ADMIN — DILTIAZEM HYDROCHLORIDE 180 MG: 180 CAPSULE, COATED, EXTENDED RELEASE ORAL at 09:15

## 2021-05-16 RX ADMIN — APIXABAN 2.5 MG: 2.5 TABLET, FILM COATED ORAL at 09:15

## 2021-05-16 RX ADMIN — ROSUVASTATIN CALCIUM 5 MG: 10 TABLET, COATED ORAL at 21:15

## 2021-05-16 RX ADMIN — Medication 10 ML: at 21:15

## 2021-05-16 RX ADMIN — SODIUM CHLORIDE 75 ML/HR: 9 INJECTION, SOLUTION INTRAVENOUS at 06:41

## 2021-05-16 RX ADMIN — SODIUM CHLORIDE 100 ML/HR: 9 INJECTION, SOLUTION INTRAVENOUS at 11:20

## 2021-05-16 RX ADMIN — APIXABAN 2.5 MG: 2.5 TABLET, FILM COATED ORAL at 18:34

## 2021-05-16 RX ADMIN — FUROSEMIDE 40 MG: 40 TABLET ORAL at 13:35

## 2021-05-16 RX ADMIN — AZITHROMYCIN MONOHYDRATE 500 MG: 500 INJECTION, POWDER, LYOPHILIZED, FOR SOLUTION INTRAVENOUS at 21:15

## 2021-05-16 RX ADMIN — Medication 10 ML: at 05:28

## 2021-05-16 RX ADMIN — METOPROLOL TARTRATE 50 MG: 50 TABLET, FILM COATED ORAL at 18:34

## 2021-05-16 RX ADMIN — CEFTRIAXONE 1 G: 1 INJECTION, POWDER, FOR SOLUTION INTRAMUSCULAR; INTRAVENOUS at 09:15

## 2021-05-16 RX ADMIN — POLYETHYLENE GLYCOL 3350 17 G: 17 POWDER, FOR SOLUTION ORAL at 22:55

## 2021-05-16 RX ADMIN — Medication 10 ML: at 13:35

## 2021-05-16 NOTE — PROGRESS NOTES
ADULT PROTOCOL: JET AEROSOL ASSESSMENT    Patient  Obdulio Daniel     80 y.o.   male     5/16/2021  3:37 PM    Breath Sounds: Right Breath Sounds: Diminished                               Left Breath Sounds: Diminished  Breathing Pattern: Regular    Heart Rate:  Pulse: 79     Respirations: 18           Oxygen: O2 Device: Nasal cannula  2L      SpO2: 99 %      Nebulizer Therapy: Current medications Xopenex  Upon patient assessment, clear/diminished breath sounds noted, no wheezing heard. Patient denies any SOB and no distress is noted. This is not a home medication. Bronchodilators not indicated at this time. Made PRN per protocol.        Problem List:   Patient Active Problem List   Diagnosis Code    Hypertension I10    Prostate cancer ([facility] C61    Diverticulosis of large intestine without hemorrhage K57.30    History of total right knee replacement Z96.651    Gallstones K80.20    History of atrial fibrillation Z86.79    Dilated aortic root (HCC) I77.810    Mild valvular heart disease I38    Dyslipidemia E78.5    GERD (gastroesophageal reflux disease) K21.9    Hyponatremia E87.1    CAP (community acquired pneumonia) J18.9    CHF exacerbation (HCC) I50.9    Acute hypoxemic respiratory failure (HCC) J96.01    [facility] I48.0       Respiratory Therapist: Romana Bear

## 2021-05-16 NOTE — PROGRESS NOTES
Bedside and Verbal shift change report given to Vandana Ingram RN (oncoming nurse) by Dionne Gordon RN (offgoing nurse). Report included the following information SBAR, Kardex, MAR, Recent Results and Cardiac Rhythm A-fib.

## 2021-05-16 NOTE — PROGRESS NOTES
I reviewed pertinent labs and imaging, and discussed /agreed on the plan of care with Dr. Denisse White      Hospitalist Progress Note    NAME: Isela Hardy   :  10/19/1926   MRN:  [id number]       Assessment / Plan:        Proten malnutrition   - poor appetite   - RD consult   - add nutritional supplement     Acute hypoxic respiratory failure  Community-acquired pneumonia  - 2L NC sats at 94%   - DC albuterol inhaler due to tachycardia add Xopenex   - cont rocephin ( day 3)/ azithromycin ( day 3)  x 5 day for CAP   - BC NGTD   - Neg Covid PCR   -   A. fib with RVR - resolved   Diastolic CHF exacerbation with preserved ejection fraction  - -115 on admission , rec'd IV dilt in ER   - cont  home med Cardizem 180 mg , metoprolol 50 mg   - HR to 120s with exertion then returns to 117   -  BNP 7493      - cont Eliquis   - appreciate Cardiology input - recc restart lasix     Acute on chronic Hyponatremia  - Baseline looks like 130s   -Urine osmolarity  400 and urine sodium <5  -  125 yesterday improved to 133 today  With IVF   - DC IVF    - DC fluid restriction  - monitor   -    Hypertension  GERD  History of prostate cancer  Hyperlipidemia  Continue the home medications.   /     Estimated discharge date: May 17  Barriers:    Code status: Full  Prophylaxis: Lovenox  Recommended Disposition: Home w/Family     Subjective:     Chief Complaint / Reason for Physician Visit  Pt awake and more alert on the phone talking with wife . Poor nutritional  intake     Review of Systems:  Symptom Y/N Comments  Symptom Y/N Comments   Fever/Chills n   Chest Pain n    Poor Appetite y   Edema n    Cough n   Abdominal Pain     Sputum    Joint Pain n    SOB/COURTNEY n   Pruritis/Rash n    Nausea/vomit n   Tolerating PT/OT     Diarrhea    Tolerating Diet n    Constipation    Other       Could NOT obtain due to:      Objective:     VITALS:   Last 24hrs VS reviewed since prior progress note.  Most recent are:  Patient Vitals for the past 24 hrs:   Temp Pulse Resp BP SpO2   05/16/21 0636 98 °F (36.7 °C) 86 18 (!) 114/56 96 %   05/16/21 0413 (!) 96.7 °F (35.9 °C) 84 16 (!) 112/58 95 %   05/15/21 2247 97.8 °F (36.6 °C) 83 18 119/65 96 %   05/15/21 2117     98 %   05/15/21 1957 98.2 °F (36.8 °C) 86 19 111/76 97 %   05/15/21 1701  96  105/71    05/15/21 1607 97.9 °F (36.6 °C) 87 14 95/60 100 %       Intake/Output Summary (Last 24 hours) at 5/16/2021 1201  Last data filed at 5/15/2021 1856  Gross per 24 hour   Intake    Output 100 ml   Net -100 ml        I had a face to face encounter and independently examined this patient on 5/16/2021, as outlined below:  PHYSICAL EXAM:  General: Thin Alert, confused no acute distress    EENT:  EOMI. Anicteric sclerae. MMM missing teeth   Resp:            no wheezing or rales. No accessory muscle use COURTNEY   CV:  Tachyccardia  rhythm,  No edema  GI:  Soft, Non distended, Non tender. +Bowel sounds  Neurologic:  Alert and oriented X 1, normal speech,   Psych:   t. Not anxious nor agitated  Skin:  No rashes. No jaundice  No skin tears     Reviewed most current lab test results and cultures  YES  Reviewed most current radiology test results   YES  Review and summation of old records today    NO  Reviewed patient's current orders and MAR    YES  PMH/ reviewed - no change compared to H&P  ________________________________________________________________________  Care Plan discussed with:    Comments   Patient x    Family      RN x    Care Manager     Consultant                        Multidiciplinary team rounds were held today with , nursing, pharmacist and clinical coordinator. Patient's plan of care was discussed; medications were reviewed and discharge planning was addressed.      ________________________________________________________________________  Total NON critical care TIME:  30  Minutes    Total CRITICAL CARE TIME Spent:   Minutes non procedure based      Comments   >50% of visit spent in counseling and coordination of care     ________________________________________________________________________  Armond Warner. Keke Warren NP     Procedures: see electronic medical records for all procedures/Xrays and details which were not copied into this note but were reviewed prior to creation of Plan. LABS:  I reviewed today's most current labs and imaging studies. Pertinent labs include:  Recent Labs     05/16/21  0020 05/14/21  1521   WBC 6.6 10.4   HGB 12.1 11.4*   HCT 40.0 34.9*    140*     Recent Labs     05/16/21  0020 05/15/21  1907 05/15/21  1816 05/15/21  0316 05/14/21  1521   *  --  131* 125* 129*   K 4.0  --   --  3.9 4.0   CL 93*  --   --  88* 89*   CO2 40*  --   --  32 38*   *  --   --  115* 144*   BUN 10  --   --  7 7   CREA 0.49*  --   --  0.47* 0.59*   CA 9.0 8.7  --  8.5 8.8   MG  --   --   --  2.2 2.1   ALB  --   --   --   --  3.4*   TBILI  --   --   --   --  0.6   ALT  --   --   --   --  44   INR  --   --   --   --  1.2*       Signed: Hanna Warren, BELLA

## 2021-05-16 NOTE — PROGRESS NOTES
End of Shift Note Bedside shift change report given to Aan Fitzgerald (oncoming nurse) by Amado Wakefield (offgoing nurse). Report included the following information SBAR, Kardex, Intake/Output, MAR and Recent Results Shift worked:  2110-4816 Shift summary and any significant changes:  
  Pt confused and agitated overnight-kept demanding to call his wife despite having both room phone and cell phone within reach. Disoriented to situation and kept saying he was dying, despite reassurance that was not the case. Asking what disease he has and not redirectable. Concerns for physician to address:  mentation Zone phone for oncoming shift:    
  
 
Activity: 
Activity Level: Dangle Side of Bed Number times ambulated in hallways past shift: 0 Number of times OOB to chair past shift: 0 Cardiac:  
Cardiac Monitoring: Yes     
Cardiac Rhythm: Atrial Fib Access:  
Current line(s): PIV Genitourinary:  
Urinary status: voiding Respiratory:  
O2 Device: Nasal cannula Chronic home O2 use?: NO Incentive spirometer at bedside: NO 
  
GI: 
  
Current diet:  DIET CARDIAC Regular Passing flatus: YES Tolerating current diet: YES Pain Management:  
Patient states pain is manageable on current regimen: N/A Skin: 
Adeel Score: 19 Interventions: float heels Patient Safety: 
Fall Score: Total Score: 4 Interventions: gripper socks High Fall Risk: Yes Length of Stay: 
Expected LOS: - - - Actual LOS: 2 Amado Wakefield

## 2021-05-16 NOTE — PROGRESS NOTES
Bedside and Verbal shift change report given to Brandon Lawrence RN (oncoming nurse) by Maria Elena Frausto (offgoing nurse). Report given with SBAR, Kardex, Intake/Output, MAR and Recent Results.

## 2021-05-17 LAB
ANION GAP SERPL CALC-SCNC: 2 MMOL/L (ref 5–15)
BUN SERPL-MCNC: 15 MG/DL (ref 6–20)
BUN/CREAT SERPL: 33 (ref 12–20)
CALCIUM SERPL-MCNC: 8.5 MG/DL (ref 8.5–10.1)
CHLORIDE SERPL-SCNC: 84 MMOL/L (ref 97–108)
CO2 SERPL-SCNC: 39 MMOL/L (ref 21–32)
CREAT SERPL-MCNC: 0.45 MG/DL (ref 0.7–1.3)
ECHO AO ROOT DIAM: 4.03 CM
ECHO AV AREA PEAK VELOCITY: 1.85 CM2
ECHO AV AREA PEAK VELOCITY: 1.86 CM2
ECHO AV AREA PEAK VELOCITY: 1.89 CM2
ECHO AV AREA PEAK VELOCITY: 1.9 CM2
ECHO AV AREA VTI: 1.66 CM2
ECHO AV AREA/BSA VTI: 1 CM2/M2
ECHO AV MEAN GRADIENT: 5.88 MMHG
ECHO AV PEAK GRADIENT: 10.38 MMHG
ECHO AV PEAK GRADIENT: 10.47 MMHG
ECHO AV PEAK VELOCITY: 161.08 CM/S
ECHO AV PEAK VELOCITY: 161.79 CM/S
ECHO AV VTI: 29.73 CM
ECHO LA MAJOR AXIS: 5.13 CM
ECHO LA MINOR AXIS: 2.94 CM
ECHO LV INTERNAL DIMENSION DIASTOLIC: 3.79 CM (ref 4.2–5.9)
ECHO LV INTERNAL DIMENSION SYSTOLIC: 3.02 CM
ECHO LV IVSD: 1.92 CM (ref 0.6–1)
ECHO LV IVSS: 2.05 CM
ECHO LV MASS 2D: 329.3 G (ref 88–224)
ECHO LV MASS INDEX 2D: 188.6 G/M2 (ref 49–115)
ECHO LV POSTERIOR WALL DIASTOLIC: 1.95 CM (ref 0.6–1)
ECHO LV POSTERIOR WALL SYSTOLIC: 1.96 CM
ECHO LVOT CARDIAC OUTPUT: 4.41 LITER/MINUTE
ECHO LVOT DIAM: 2.11 CM
ECHO LVOT PEAK GRADIENT: 2.95 MMHG
ECHO LVOT PEAK GRADIENT: 3.07 MMHG
ECHO LVOT PEAK VELOCITY: 85.38 CM/S
ECHO LVOT PEAK VELOCITY: 87.03 CM/S
ECHO LVOT SV: 49.3 ML
ECHO LVOT VTI: 14.06 CM
ECHO MV AREA PHT: 5.41 CM2
ECHO MV PRESSURE HALF TIME (PHT): 40.67 MS
ECHO PV MAX VELOCITY: 57.56 CM/S
ECHO PV PEAK INSTANTANEOUS GRADIENT SYSTOLIC: 1.33 MMHG
ECHO PV REGURGITANT MAX VELOCITY: 120.82 CM/S
ECHO TV REGURGITANT MAX VELOCITY: 216.53 CM/S
ECHO TV REGURGITANT MAX VELOCITY: 492.54 CM/S
ECHO TV REGURGITANT PEAK GRADIENT: 18.93 MMHG
GLUCOSE SERPL-MCNC: 113 MG/DL (ref 65–100)
HEMOCCULT STL QL: POSITIVE
LVOT MG: 1.53 MMHG
POTASSIUM SERPL-SCNC: 4.4 MMOL/L (ref 3.5–5.1)
SODIUM SERPL-SCNC: 125 MMOL/L (ref 136–145)

## 2021-05-17 PROCEDURE — 80048 BASIC METABOLIC PNL TOTAL CA: CPT

## 2021-05-17 PROCEDURE — 94760 N-INVAS EAR/PLS OXIMETRY 1: CPT

## 2021-05-17 PROCEDURE — 74011250637 HC RX REV CODE- 250/637: Performed by: STUDENT IN AN ORGANIZED HEALTH CARE EDUCATION/TRAINING PROGRAM

## 2021-05-17 PROCEDURE — 2709999900 HC NON-CHARGEABLE SUPPLY

## 2021-05-17 PROCEDURE — 99233 SBSQ HOSP IP/OBS HIGH 50: CPT | Performed by: INTERNAL MEDICINE

## 2021-05-17 PROCEDURE — 36415 COLL VENOUS BLD VENIPUNCTURE: CPT

## 2021-05-17 PROCEDURE — 82272 OCCULT BLD FECES 1-3 TESTS: CPT

## 2021-05-17 PROCEDURE — 97165 OT EVAL LOW COMPLEX 30 MIN: CPT

## 2021-05-17 PROCEDURE — 65660000000 HC RM CCU STEPDOWN

## 2021-05-17 PROCEDURE — 74011250637 HC RX REV CODE- 250/637: Performed by: NURSE PRACTITIONER

## 2021-05-17 PROCEDURE — 97535 SELF CARE MNGMENT TRAINING: CPT

## 2021-05-17 PROCEDURE — 97530 THERAPEUTIC ACTIVITIES: CPT

## 2021-05-17 PROCEDURE — 74011000258 HC RX REV CODE- 258: Performed by: STUDENT IN AN ORGANIZED HEALTH CARE EDUCATION/TRAINING PROGRAM

## 2021-05-17 PROCEDURE — 97163 PT EVAL HIGH COMPLEX 45 MIN: CPT

## 2021-05-17 PROCEDURE — 77010033678 HC OXYGEN DAILY

## 2021-05-17 PROCEDURE — 74011250636 HC RX REV CODE- 250/636: Performed by: STUDENT IN AN ORGANIZED HEALTH CARE EDUCATION/TRAINING PROGRAM

## 2021-05-17 RX ORDER — GUAIFENESIN 600 MG/1
600 TABLET, EXTENDED RELEASE ORAL EVERY 12 HOURS
Status: DISCONTINUED | OUTPATIENT
Start: 2021-05-17 | End: 2021-05-19

## 2021-05-17 RX ADMIN — METOPROLOL TARTRATE 50 MG: 50 TABLET, FILM COATED ORAL at 17:56

## 2021-05-17 RX ADMIN — GUAIFENESIN 600 MG: 600 TABLET, EXTENDED RELEASE ORAL at 14:47

## 2021-05-17 RX ADMIN — CEFTRIAXONE 1 G: 1 INJECTION, POWDER, FOR SOLUTION INTRAMUSCULAR; INTRAVENOUS at 09:09

## 2021-05-17 RX ADMIN — APIXABAN 2.5 MG: 2.5 TABLET, FILM COATED ORAL at 17:56

## 2021-05-17 RX ADMIN — AZITHROMYCIN MONOHYDRATE 500 MG: 500 INJECTION, POWDER, LYOPHILIZED, FOR SOLUTION INTRAVENOUS at 22:35

## 2021-05-17 RX ADMIN — Medication 10 ML: at 05:59

## 2021-05-17 RX ADMIN — DILTIAZEM HYDROCHLORIDE 180 MG: 180 CAPSULE, COATED, EXTENDED RELEASE ORAL at 09:09

## 2021-05-17 RX ADMIN — METOPROLOL TARTRATE 50 MG: 50 TABLET, FILM COATED ORAL at 09:09

## 2021-05-17 RX ADMIN — Medication 10 ML: at 14:47

## 2021-05-17 RX ADMIN — APIXABAN 2.5 MG: 2.5 TABLET, FILM COATED ORAL at 09:09

## 2021-05-17 RX ADMIN — GUAIFENESIN 600 MG: 600 TABLET, EXTENDED RELEASE ORAL at 22:35

## 2021-05-17 RX ADMIN — ROSUVASTATIN CALCIUM 5 MG: 10 TABLET, COATED ORAL at 22:35

## 2021-05-17 RX ADMIN — Medication 10 ML: at 22:38

## 2021-05-17 NOTE — PROGRESS NOTES
Physician Progress Note      Peter Rhoades  CSN #:                  [id number]  :                       10/19/1926  ADMIT DATE:       2021 8:30 PM  [address] [place] DATE:  RESPONDING  PROVIDER #:        Sonali Andrew MD          QUERY TEXT:    Patient admitted with pna, chf. If possible, please document in progress notes and discharge summary if you are evaluating and /or treating any of the following: The medical record reflects the following:  Risk Factors: HTN and prostate cancer  Clinical Indicators:  RD: Malnutrition Status:  Severe malnutrition  Context:  Chronic illness  Findings of the 6 clinical characteristics of malnutrition:  Energy Intake:  Mild decrease in energy intake (specify)  Weight Loss:  1.0 - 7.5% over 3 months  Body Fat Loss:  7 - Severe body fat loss,  Muscle Mass Loss:  7 - Severe muscle mass loss,  Treatment: RD cx, supplements TID,  Thanks,  Byron Chen RN/CDI   Options provided:  -- Protein calorie malnutrition severe  -- Underweight with BMI 19  -- Other - I will add my own diagnosis  -- Disagree - Not applicable / Not valid  -- Disagree - Clinically unable to determine / Unknown  -- Refer to Clinical Documentation Reviewer    PROVIDER RESPONSE TEXT:    This patient has severe protein calorie malnutrition.     Query created by: Dawna Avalos on 2021 10:11 AM      Electronically signed by:  Sonali Andrew MD 2021 10:35 AM

## 2021-05-17 NOTE — PROGRESS NOTES
Physical Therapy Note      Orders received, chart reviewed and patient evaluated by occupational therapy. Pending progression with skilled acute physical l therapy, recommend:  Therapy up to 5 days/week in SNF setting      Of note patient is orthostatic in standing. He does not use home O2 at baseline.  Currently on 2L/min.      Full evaluation to follow.      Francis Mills DPT

## 2021-05-17 NOTE — PROGRESS NOTES
Occupational Therapy    Orders received, chart reviewed and patient evaluated by occupational therapy. Pending progression with skilled acute occupational therapy, recommend:  Therapy up to 5 days/week in SNF setting     Recommend with nursing ADLs with supervision/setup, OOB to chair 3x/day and toileting via beside commode with RW and 2 person assist (stand-pivot transfer only; orthostatic). Thank you for completing as able in order to maintain patient strength, endurance and independence. Full evaluation to follow.      Hanna Baron OTR/L

## 2021-05-17 NOTE — PROGRESS NOTES
Problem: Falls - Risk of  Goal: *Absence of Falls  Description: Document Kevon Shove Fall Risk and appropriate interventions in the flowsheet.   Outcome: Progressing Towards Goal  Note: Fall Risk Interventions:  Mobility Interventions: Bed/chair exit alarm    Mentation Interventions: Bed/chair exit alarm, Reorient patient    Medication Interventions: Bed/chair exit alarm, Patient to call before getting OOB    Elimination Interventions: Call light in reach, Bed/chair exit alarm              Problem: Breathing Pattern - Ineffective  Goal: *Absence of hypoxia  Outcome: Progressing Towards Goal

## 2021-05-17 NOTE — PROGRESS NOTES
Comprehensive Nutrition Assessment    Type and Reason for Visit: Initial, Positive nutrition screen, Consult    Nutrition Recommendations/Plan:  Continue current diet  Ensure enlive TID   Assistance with meal set-up    Nutrition Assessment:     Patient medically noted for acute respiratory failure, CHF, pneumonia, AFIB, and acute on chronic hyponatremia. PMH HTN and prostate cancer. Receiving a cardiac diet. Patient finishing breakfast at time of attempted visit. Ate ~25% or less but drinking ensure enlive. He reports he used to live off boost supplements at home; asked if someone could bring some in for him since we only carry Ensure. Patient received a phone call and then NP entered room so visit cut short. Patient with muscle wasting and fat loss noted and meets criteria for chronic severe malnutrition. Encouraged intake of meals. Malnutrition Assessment:  Malnutrition Status:  Severe malnutrition    Context:  Chronic illness     Findings of the 6 clinical characteristics of malnutrition:   Energy Intake:  Mild decrease in energy intake (specify)  Weight Loss:  1.0 - 7.5% over 3 months     Body Fat Loss:  7 - Severe body fat loss,     Muscle Mass Loss:  7 - Severe muscle mass loss,    Fluid Accumulation:  No significant fluid accumulation,     Strength:  Not performed     Estimated Daily Nutrient Needs:  Energy (kcal): 1912 kcal (BMR 1240 x 1.3AF +300kcal);  Weight Used for Energy Requirements: Current  Protein (g): 79-92g (1.3-1.5 g/kg bw); Weight Used for Protein Requirements: Current  Fluid (ml/day): 1500 mL per MD; Method Used for Fluid Requirements: 1 ml/kcal    Nutrition Related Findings:       Na 125, -215-503-144  BM 5/17  Lopressor, Rosuvastatin   3-4 boost supplements/day at home    Wounds:    None       Current Nutrition Therapies:  DIET ONE TIME MESSAGE  DIET CARDIAC Regular; FR 1500ML    Anthropometric Measures:  · Height:  5' 9\" (175.3 cm)  · Current Body Wt:  61.1 kg (134 lb 11.2 oz) · BMI Category:  Underweight (BMI less than 22) age over 72       Nutrition Diagnosis:   · Inadequate protein-energy intake related to (poor appetite/intake of meals) as evidenced by intake 0-25%, intake 26-50%, weight loss 7.5% in 3 months    Nutrition Interventions:   Food and/or Nutrient Delivery: Continue current diet, Start oral nutrition supplement  Nutrition Education and Counseling: No recommendations at this time  Coordination of Nutrition Care: No recommendation at this time    Goals:  PO intake at least 50% of meals and 75% of ONS next 3-5 days       Nutrition Monitoring and Evaluation:   Behavioral-Environmental Outcomes: None identified  Food/Nutrient Intake Outcomes: Food and nutrient intake, Supplement intake  Physical Signs/Symptoms Outcomes: Biochemical data, Weight    Discharge Planning:    Continue oral nutrition supplement     Electronically signed by Fleta Rubinstein, RD on 5/17/2021 at 8:50 AM    Contact: ext [phone]

## 2021-05-17 NOTE — PROGRESS NOTES
Problem: Self Care Deficits Care Plan (Adult)  Goal: *Acute Goals and Plan of Care (Insert Text)  Description:   FUNCTIONAL STATUS PRIOR TO ADMISSION: Per patient report, he was active without device. Per patient, independent in ADLs and still driving. HOME SUPPORT: Patient reports living with wife, declines need for assist.    Occupational Therapy Goals  Initiated 5/17/2021  1. Patient will tolerate brief standing grooming with minimal assistance using most appropriate DME prn within 7 day(s). 2.  Patient will perform lower body dressing with minimal assistance using most appropriate DME prn within 7 day(s). 3.  Patient will perform toilet transfers to [facility] with minimal assistance using most appropriate DME prn within 7 day(s). 4.  Patient will perform all aspects of toileting with minimal assistance using most appropriate DME prn within 7 day(s). 5.  Patient will participate in upper extremity therapeutic exercise/activities with supervision/set-up for 10 minutes within 7 day(s). 6.  Patient will utilize energy conservation and fall prevention techniques during functional activities with minimal verbal cues within 7 day(s). Outcome: Progressing Towards Goal   OCCUPATIONAL THERAPY EVALUATION  Patient: Stephanie Benavidez (60 y.o. male)  Date: 5/17/2021  Primary Diagnosis: Acute hypoxemic respiratory failure ([facility] [address] [J96.01]  CHF exacerbation (HCC) [I50.9]  CAP (community acquired pneumonia) [J18.9]        Precautions:  Fall    ASSESSMENT  Based on the objective data described below, the patient presents with generalized weakness, decreased activity tolerance, impaired balance, orthostatic hypotension, new need for supplemental O2, and decreased processing and word-finding ability s/p admission for acute hypoxemic respiratory failure. Patient received on 2L O2 with SpO2 92%, agreeable to session but with noted difficulty answering questions - patient acknowledges feeling slightly confused.  Note patient very perseverative, requiring frequent redirection to appropriate topic throughout session. Patient also requiring increased time for all activity / mobility. Although patient denies baseline incontinence, received incontinent of bowels. Although he tolerated supine>sit and initial sit>stand transfer well, note patient orthostatic with extended standing for BM hygiene and for transfer to recliner chair, requiring mod assist x2 for safe stand-pivot. At this time, patient is well below his reported independent baseline, at high risk for falls; anticipate need for SNF rehab to maximize functional gains and facilitate safe return to baseline roles/routines. Current Level of Function Impacting Discharge (ADLs/self-care): up to max A for distal lower body; overall min-mod for self-care    Functional Outcome Measure: The patient scored 15/100 on the Barthel Index. Other factors to consider for discharge: independent at baseline; orthostatic (RN notified)     Patient will benefit from skilled therapy intervention to address the above noted impairments. PLAN :  Recommendations and Planned Interventions: self care training, functional mobility training, therapeutic exercise, balance training, therapeutic activities, endurance activities, patient education, and home safety training    Frequency/Duration: Patient will be followed by occupational therapy 5 times a week to address goals. Recommendation for discharge: (in order for the patient to meet his/her long term goals)  Therapy up to 5 days/week in SNF setting    This discharge recommendation:  Has been made in collaboration with the attending provider and/or case management    IF patient discharges home will need the following DME: TBD       SUBJECTIVE:   Patient stated I used to be really [facility]     OBJECTIVE DATA SUMMARY:   HISTORY:   Past Medical History:   Diagnosis Date    Afib ([facility] [address]     Dilated aortic root ([facility] [address] Diverticulosis     Dyslipidemia     GERD (gastroesophageal reflux disease)     HTN (hypertension)     Hx of colonic polyps     Hyponatremia 2/23/2021    Mild valvular heart disease     Prostate cancer ([facility] [address] 2014    s/p XRT (proton beam)     Past Surgical History:   Procedure Laterality Date    HX ORTHOPAEDIC      HX UROLOGICAL         Expanded or extensive additional review of patient history:     Home Situation  Home Environment: Private residence  One/Two Story Residence: One story  Living Alone: No  Support Systems: Family member(s), Friends \ neighbors, [place] community  Patient Expects to be Discharged to[de-identified] Unknown  Current DME Used/Available at Home: Shower chair, Walker, rolling, Cane, straight    Hand dominance: Right    EXAMINATION OF PERFORMANCE DEFICITS:  Cognitive/Behavioral Status:  Neurologic State: Alert  Orientation Level: Oriented to person;Oriented to place;Oriented to situation  Cognition: Follows commands;Decreased attention/concentration  Perception: Verbal;Tactile  Perseveration: Perseverates during conversation(h/o of running, competing in olympics)  Safety/Judgement: Decreased insight into deficits; Fall prevention    Hearing: Auditory  Auditory Impairment: None    Vision/Perceptual:    Corrective Lenses: Glasses    Range of Motion:  AROM: Generally decreased, functional  PROM: Generally decreased, functional    Strength:  Strength: Generally decreased, functional    Coordination:  Coordination: Generally decreased, functional  Fine Motor Skills-Upper: Left Intact; Right Intact    Gross Motor Skills-Upper: Left Intact; Right Intact    Balance:  Sitting: Impaired; With support  Sitting - Static: Good (unsupported)  Sitting - Dynamic: Fair (occasional)  Standing: Impaired; With support  Standing - Static: Constant support;Fair;Poor  Standing - Dynamic : Poor    Functional Mobility and Transfers for ADLs:  Bed Mobility:  Supine to Sit: Minimum assistance;Bed Modified; Additional time  Scooting: Minimum assistance    Transfers:  Sit to Stand: Moderate assistance  Stand to Sit: Moderate assistance  Bed to Chair: Moderate assistance;Assist x2;Adaptive equipment  Toilet Transfer : Moderate assistance;Assist x2;Adaptive equipment(to [facility] via stand-pivot)    ADL Assessment:  Feeding: Setup    Oral Facial Hygiene/Grooming: Setup;Stand-by assistance(in seated)    Upper Body Dressing: Minimum assistance    Lower Body Dressing: Maximum assistance (inferred)    Toileting: Moderate assistance    ADL Intervention and task modifications:  Grooming  Grooming Assistance: Set-up  Washing Hands: Set-up; Stand-by assistance(using hand wipes)  Brushing Teeth:  Set-up (with materials; declined need for assist)    Toileting  Toileting Assistance: Moderate assistance  Bowel Hygiene: Moderate assistance  Clothing Management: Moderate assistance  Cues: Verbal cues provided;Visual cues provided;Physical assistance for pants up    Cognitive Retraining  Problem Solving: Identifying the task  Executive Functions: Executing cognitive plans  Safety/Judgement: Decreased insight into deficits; Fall prevention    Functional Measure:  Barthel Index:    Bathin  Bladder: 0  Bowels: 0  Groomin  Dressin  Feedin  Mobility: 0  Stairs: 0  Toilet Use: 5  Transfer (Bed to Chair and Back): 5  Total: 15/100        The Barthel ADL Index: Guidelines  1. The index should be used as a record of what a patient does, not as a record of what a patient could do. 2. The main aim is to establish degree of independence from any help, physical or verbal, however minor and for whatever reason. 3. The need for supervision renders the patient not independent. 4. A patient's performance should be established using the best available evidence. Asking the patient, friends/relatives and nurses are the usual sources, but direct observation and common sense are also important. However direct testing is not needed.   5. Usually the patient's performance over the preceding 24-48 hours is important, but occasionally longer periods will be relevant. 6. Middle categories imply that the patient supplies over 50 per cent of the effort. 7. Use of aids to be independent is allowed. Elijah Speaker., Barthel, D.W. (7256). Functional evaluation: the Barthel Index. [facility] (14)2. JAVIER Foster, Mery Vallejo., Brandon Delacruz.[facility], [address] [place] [address] (1999). Measuring the change indisability after inpatient rehabilitation; comparison of the responsiveness of the Barthel Index and Functional [place] Measure. Journal of Neurology, Neurosurgery, and Psychiatry, 66(4), 612-455. Socorro Suarez, N.J.A, MACIE Mack, & Reed Benavides M.A. (2004.) Assessment of post-stroke quality of life in cost-effectiveness studies: The usefulness of the Barthel Index and the EuroQoL-5D. Quality of Life Research, 15, 373-74         Occupational Therapy Evaluation Charge Determination   History Examination Decision-Making   LOW Complexity : Brief history review  LOW Complexity : 1-3 performance deficits relating to physical, cognitive , or psychosocial skils that result in activity limitations and / or participation restrictions  LOW Complexity : No comorbidities that affect functional and no verbal or physical assistance needed to complete eval tasks       Based on the above components, the patient evaluation is determined to be of the following complexity level: LOW   Pain Rating:  No reports. Activity Tolerance:   Fair, requires rest breaks, and signs and symptoms of orthostatic hypotension    After treatment patient left in no apparent distress:    Sitting in chair, Call bell within reach, and Bed / chair alarm activated    COMMUNICATION/EDUCATION:   The patients plan of care was discussed with: Physical therapist and Registered nurse.      Home safety education was provided and the patient/caregiver indicated understanding., Patient/family have participated as able in goal setting and plan of care. , and Patient/family agree to work toward stated goals and plan of care.     Thank you for this referral.  Bennett Dean, OT  Time Calculation: 54 mins

## 2021-05-17 NOTE — PROGRESS NOTES
I reviewed pertinent labs and imaging, and discussed /agreed on the plan of care with Dr. Raj Perez      Hospitalist Progress Note    NAME: Bev Archuleta   :  10/19/1926   MRN:  [id number]       Assessment / Plan:        Man malnutrition -severe noted by 7% weight loss / 3 months   - poor appetite   - RD in put noted  increase nutritional supplements at meal time  - Nursing staff to provide meal set up assistance    Acute hypoxic respiratory failure  Community-acquired pneumonia  - 2L NC sats at 94%   - cont  Xopenex neb as needed   - cont rocephin ( day 4) azithromycin ( day 4)  x 5 day for CAP   - will switch to po in am   - BC NGTD   - Neg Covid PCR   -   A. fib with RVR - resolved   Diastolic CHF exacerbation with preserved ejection fraction  - -115 on admission , rec'd IV dilt in ER   - cont  home med Cardizem 180 mg , metoprolol 50 mg   - HR to 120s with exertion then returns to 105  -  BNP 7493   will repeat in am    - cont Eliquis   - appreciate Cardiology input - hold lasix this morning      ECHO   · LV: Estimated LVEF is 50 - 55%. Normal cavity size and systolic function (ejection fraction normal). Moderate concentric hypertrophy. · LA: Dilated left atrium. · AV: Mild aortic valve leaflet calcification present. · MV: Moderate to severe mitral valve regurgitation is present. PISA calculations were not performed. · AO: Mild ascending aorta dilatation. Ascending aorta diameter = 4.6 cm. Acute on chronic Hyponatremia  - Baseline looks like 130s   - 5/15  Urine osmolarity  400 and urine sodium <5  - improved to 133 with hydration and holding Lasix  5/16  -  125 again today  after IV Lasix yesterday   - will hold po lasix today    - pt alert seems at baseline today   - will cont to monitor       Hypertension  GERD  History of prostate cancer  Hyperlipidemia  Continue the home medications.   - orthostatic today with sitting - monitor    /     Estimated discharge date: May 17  Barriers:    Code status: Full  Prophylaxis: Lovenox  Recommended Disposition: Home w/Family     Subjective:     Chief Complaint / Reason for Physician Visit  Pt awake conversational , periods of confusion , repeat questions  Wants to go home , discussed low Na with pt and continued oxygen need    Review of Systems:  Symptom Y/N Comments  Symptom Y/N Comments   Fever/Chills n   Chest Pain n    Poor Appetite y   Edema n    Cough n   Abdominal Pain     Sputum    Joint Pain n    SOB/COURTNEY n   Pruritis/Rash n    Nausea/vomit n   Tolerating PT/OT     Diarrhea    Tolerating Diet n    Constipation    Other       Could NOT obtain due to:      Objective:     VITALS:   Last 24hrs VS reviewed since prior progress note. Most recent are:  Patient Vitals for the past 24 hrs:   Temp Pulse Resp BP SpO2   05/17/21 0748 98 °F (36.7 °C) (!) 110 20 135/81 91 %   05/17/21 0747     (!) 88 %   05/17/21 0444 97.4 °F (36.3 °C) (!) 108 18 112/71 94 %   05/17/21 0012 97.7 °F (36.5 °C) 82 18 108/74 96 %   05/16/21 2000  82      05/16/21 1828 98.1 °F (36.7 °C) 96 18 130/77 100 %   05/16/21 1536     99 %   05/16/21 1522 97.9 °F (36.6 °C) 79 18 (!) 151/72 99 %   05/16/21 1200 98.7 °F (37.1 °C) 89 16 112/69 100 %       Intake/Output Summary (Last 24 hours) at 5/17/2021 1779  Last data filed at 5/17/2021 5077  Gross per 24 hour   Intake 500 ml   Output 1200 ml   Net -700 ml        I had a face to face encounter and independently examined this patient on 5/17/2021, as outlined below:  PHYSICAL EXAM:  General: Thin Alert, confused no acute distress    EENT:  EOMI. Anicteric sclerae. MMM missing teeth   Resp:            no wheezing or rales. No accessory muscle use COURTNEY   CV:  S1S2,  No edema  GI:  Soft, Non distended, Non tender. +Bowel sounds  Neurologic:  Alert and oriented X 1, normal speech,   Psych:   . Not anxious nor agitated , confused   Skin:  No rashes.   No jaundice  No skin tears     Reviewed most current lab test results and cultures YES  Reviewed most current radiology test results   YES  Review and summation of old records today    NO  Reviewed patient's current orders and MAR    YES  PMH/SH reviewed - no change compared to H&P  ________________________________________________________________________  Care Plan discussed with:    Comments   Patient x    Family      RN x    Care Manager     Consultant                        Multidiciplinary team rounds were held today with , nursing, pharmacist and clinical coordinator. Patient's plan of care was discussed; medications were reviewed and discharge planning was addressed. ________________________________________________________________________  Total NON critical care TIME:  30  Minutes    Total CRITICAL CARE TIME Spent:   Minutes non procedure based      Comments   >50% of visit spent in counseling and coordination of care     ________________________________________________________________________  Michelle Seats. Blessing Bonner NP     Procedures: see electronic medical records for all procedures/Xrays and details which were not copied into this note but were reviewed prior to creation of Plan. LABS:  I reviewed today's most current labs and imaging studies. Pertinent labs include:  Recent Labs     05/16/21  0020 05/14/21  1521   WBC 6.6 10.4   HGB 12.1 11.4*   HCT 40.0 34.9*    140*     Recent Labs     05/17/21  0208 05/16/21  0020 05/15/21  1907 05/15/21  1816 05/15/21  0316 05/14/21  1521   * 133*  --  131* 125* 129*   K 4.4 4.0  --   --  3.9 4.0   CL 84* 93*  --   --  88* 89*   CO2 39* 40*  --   --  32 38*   * 113*  --   --  115* 144*   BUN 15 10  --   --  7 7   CREA 0.45* 0.49*  --   --  0.47* 0.59*   CA 8.5 9.0 8.7  --  8.5 8.8   MG  --   --   --   --  2.2 2.1   ALB  --   --   --   --   --  3.4*   TBILI  --   --   --   --   --  0.6   ALT  --   --   --   --   --  44   INR  --   --   --   --   --  1.2*       Signed: Hanna Bonner, BELLA

## 2021-05-17 NOTE — PROGRESS NOTES
5/16/2021 9:32 PM    Admit Date: 5/14/2021    Admit Diagnosis:   Acute hypoxemic respiratory failure (HCC) [J96.01];CHF exacerbation (HCC) [I50.9];CAP (community acquired pneumonia) [J18.9]    Subjective:     Bridget Uriarte denies chest pain or shortness of breath. Repeatedly asking me to confirm that he does not have Covid. Current Facility-Administered Medications   Medication Dose Route Frequency    [START ON 5/17/2021] furosemide (LASIX) tablet 20 mg  20 mg Oral DAILY    levalbuterol (XOPENEX) nebulizer soln 0.63 mg/3 mL  0.63 mg Nebulization Q6H PRN    dilTIAZem ER (CARDIZEM CD) capsule 180 mg  180 mg Oral DAILY    apixaban (ELIQUIS) tablet 2.5 mg  2.5 mg Oral BID    metoprolol tartrate (LOPRESSOR) tablet 50 mg  50 mg Oral BID    rosuvastatin (CRESTOR) tablet 5 mg  5 mg Oral QHS    azithromycin (ZITHROMAX) 500 mg in 0.9% sodium chloride 250 mL (VIAL-MATE)  500 mg IntraVENous Q24H    cefTRIAXone (ROCEPHIN) 1 g in 0.9% sodium chloride (MBP/ADV) 50 mL MBP  1 g IntraVENous Q24H    sodium chloride (NS) flush 5-40 mL  5-40 mL IntraVENous Q8H    sodium chloride (NS) flush 5-40 mL  5-40 mL IntraVENous PRN    polyethylene glycol (MIRALAX) packet 17 g  17 g Oral DAILY PRN    ondansetron (ZOFRAN ODT) tablet 4 mg  4 mg Oral Q8H PRN    Or    ondansetron (ZOFRAN) injection 4 mg  4 mg IntraVENous Q6H PRN         Objective:      Physical Exam:    Visit Vitals  /77 (BP 1 Location: Right upper arm, BP Patient Position: At rest)   Pulse 96   Temp 98.1 °F (36.7 °C)   Resp 18   Ht 5' 9\" (1.753 m)   Wt 134 lb 11.2 oz (61.1 kg)   SpO2 100%   BMI 19.89 kg/m²     Gen:  NAD  Mental Status - Alert. General Appearance - Not in acute distress. Chest and Lung Exam   Inspection: Accessory muscles - No use of accessory muscles in breathing.    Auscultation:   Breath sounds: -Few crackles in the bases  Cardiovascular   Inspection: Jugular vein - Bilateral - Inspection Normal.   Palpation/Percussion:   Apical Impulse: - Normal.   Auscultation: Rhythm - Regular. Heart Sounds - S1 WNL and S2 WNL. No S3 or S4. Murmurs & Other Heart Sounds: Auscultation of the heart reveals - No Murmurs. Peripheral Vascular   Upper Extremity: Inspection - Bilateral - No Cyanotic nailbeds or Digital clubbing. Lower Extremity:   Palpation: Edema - Bilateral - No edema. Abdomen:   Soft, non-tender, bowel sounds are active. Neuro: Alert, confused, repeatedly asking the same questions, CN and motor grossly WNL    Data Review:   Recent Labs     05/16/21  0020 05/14/21  1521   WBC 6.6 10.4   HGB 12.1 11.4*   HCT 40.0 34.9*    140*     Recent Labs     05/16/21  0020 05/15/21  1907 05/15/21  1816 05/15/21  0316 05/14/21  1521   *  --  131* 125* 129*   K 4.0  --   --  3.9 4.0   CL 93*  --   --  88* 89*   CO2 40*  --   --  32 38*   *  --   --  115* 144*   BUN 10  --   --  7 7   CREA 0.49*  --   --  0.47* 0.59*   CA 9.0 8.7  --  8.5 8.8   MG  --   --   --  2.2 2.1   ALB  --   --   --   --  3.4*   TBILI  --   --   --   --  0.6   ALT  --   --   --   --  44   INR  --   --   --   --  1.2*       Recent Labs     05/14/21  1521   TROIQ <0.05         Intake/Output Summary (Last 24 hours) at 5/16/2021 2132  Last data filed at 5/16/2021 1828  Gross per 24 hour   Intake 500 ml   Output 200 ml   Net 300 ml        Telemetry: AFIB    Assessment:     Principal Problem:    CAP (community acquired pneumonia) (5/14/2021)    Active Problems:    Hypertension (5/31/2014)      Dyslipidemia ()      CHF exacerbation ([facility] (5/14/2021)      Acute hypoxemic respiratory failure ([facility] (5/14/2021)      PAF (paroxysmal atrial fibrillation) ([facility] (5/15/2021)        Plan:     Bev Archuleta is a 80 y.o. male admitted for Acute hypoxemic respiratory failure ([facility] [J96.01];CHF exacerbation ([facility] [I50.9];CAP (community acquired pneumonia) [J18.9]. We are consulted for paroxysmal atrial fibrillation with slightly rapid ventricular response.   He is known to  Shanta Inga, and is on p.o. diltiazem and Eliquis at baseline. Last EKG in the office showed sinus rhythm. Today he is in atrial fibrillation, with controlled ventricular rate after a bolus of IV diltiazem. Since heart rate came under control, it was decided not to start a diltiazem drip. Paroxysmal atrial fibrillation:  Rate currently controlled. Continue PO diltiazem, metoprolol and Eliquis. Considering advanced age, dementia and comorbidities I favor a rate control and anticoagulation strategy rather than rhythm control. Possible CHF:  LVEF normal, cannot rule out component of diastolic heart failure. Diuretic currently held due to fairly good oxygenation and significant hyponatremia. Add Lasix today with pro calcitonin negative and crackles on examsuspect diastolic heart failure. proBNP is elevated at 7000+. Echo still pending    Suspected community-acquired pneumonia:  As per internal medicine. Procalcitonin is normalthis may argue for acute heart failure with preserved ejection fraction greater than pneumonia. Hyponatremia:  As per internal medicine. Improved. Starting Lasix today at 40 mg and then 20 mg daily. Dyslipidemia:  Continue statin    Hypertension:  Currently well controlled.

## 2021-05-17 NOTE — PROGRESS NOTES
End of Shift Note    Bedside shift change report given to Kendal Curtis RN (oncoming nurse) by Giovanna Ureña (offgoing nurse). Report included the following information SBAR, Kardex, ED Summary, Intake/Output and MAR    Shift worked:  nights   Shift summary and any significant changes:     pt stated that he cannot breath. Monitor: o2 sats was 96. Patient: did not show any signs of labored breathing other than the expected for CAP. Pt spoke to nurse all night, requested many things. Bits of confusion throughout the night. Replaced condom cath. 2 bowel movements. Bath given. Bath given. Concerns for physician to address:  n/a   Zone phone for oncoming shift:   [phone]     Patient Information  Omid Alamo  80 y.o.  5/14/2021  8:30 PM by Karla Mata MD. Omid Alamo was admitted from Home    Problem List  Patient Active Problem List    Diagnosis Date Noted    PAF (paroxysmal atrial fibrillation) ([address] 05/15/2021    CAP (community acquired pneumonia) 05/14/2021    CHF exacerbation ([address] 05/14/2021    Acute hypoxemic respiratory failure ([address] 05/14/2021    Hyponatremia 02/23/2021    Dilated aortic root ([address]     Mild valvular heart disease     Dyslipidemia     GERD (gastroesophageal reflux disease)     Diverticulosis of large intestine without hemorrhage 08/24/2017    History of total right knee replacement 08/24/2017    Gallstones 08/24/2017    History of atrial fibrillation 08/24/2017    Hypertension 05/31/2014    Prostate cancer ([address] 05/31/2014     Past Medical History:   Diagnosis Date    Afib ([address]     Dilated aortic root ([address]     Diverticulosis     Dyslipidemia     GERD (gastroesophageal reflux disease)     HTN (hypertension)     Hx of colonic polyps     Hyponatremia 2/23/2021    Mild valvular heart disease     Prostate cancer ([address] 2014    s/p XRT (proton beam)       Core Measures:  CVA:  CHF: Yes    Activity:  Activity Level:  Up with Assistance  Number times ambulated in hallways past shift: 0  Number of times OOB to chair past shift: 1    Cardiac:   Cardiac Monitoring: Yes      Cardiac Rhythm: Atrial Fib    Access:   Current line(s): no access       Genitourinary:   Urinary status: voiding      Respiratory:   O2 Device: Nasal cannula  Chronic home O2 use?: NO  Incentive spirometer at bedside: NO       GI:  Last Bowel Movement Date: 05/16/21  Current diet:  DIET CARDIAC Regular  DIET ONE TIME MESSAGE  Passing flatus: YES  Tolerating current diet: YES       Pain Management:   Patient states pain is manageable on current regimen: YES    Skin:  Adeel Score: 19  Interventions: nutritional support     Patient Safety:  Fall Score:  Total Score: 4  Interventions: bed/chair alarm  High Fall Risk: Yes  @Rollbelt  @dexterity to release roll belt  Yes/No ( must document dexterity  here by stating Yes or No here, otherwise this is a restraint and must follow restraint documentation policy.)    DVT prophylaxis:  DVT prophylaxis Med- No  DVT prophylaxis SCD or BELLO- No     Wounds: (If Applicable)  Wounds- No  Location - Blanchable sacrum    Active Consults:  IP CONSULT TO CARDIOLOGY          Kaitlyn Chin

## 2021-05-17 NOTE — PROGRESS NOTES
5/17/2021 1015 AM     Admit Date: 5/14/2021    Admit Diagnosis:   Acute hypoxemic respiratory failure (HCC) [J96.01];CHF exacerbation (HCC) [I50.9];CAP (community acquired pneumonia) [J18.9]    Subjective:     Betsy Russ denies chest pain or shortness of breath. Working with Physical therapy at time of rounding, ortho statis noted from sitting to standing position. Afib rate as high as 130's, BP marginal.   Sodium 125 this am, ECHO in Feb showed EF 69-45%, mild diastolic dysfunction, may benefit from fluids       Current Facility-Administered Medications   Medication Dose Route Frequency    [Held by provider] furosemide (LASIX) tablet 20 mg  20 mg Oral DAILY    levalbuterol (XOPENEX) nebulizer soln 0.63 mg/3 mL  0.63 mg Nebulization Q6H PRN    dilTIAZem ER (CARDIZEM CD) capsule 180 mg  180 mg Oral DAILY    apixaban (ELIQUIS) tablet 2.5 mg  2.5 mg Oral BID    metoprolol tartrate (LOPRESSOR) tablet 50 mg  50 mg Oral BID    rosuvastatin (CRESTOR) tablet 5 mg  5 mg Oral QHS    azithromycin (ZITHROMAX) 500 mg in 0.9% sodium chloride 250 mL (VIAL-MATE)  500 mg IntraVENous Q24H    cefTRIAXone (ROCEPHIN) 1 g in 0.9% sodium chloride (MBP/ADV) 50 mL MBP  1 g IntraVENous Q24H    sodium chloride (NS) flush 5-40 mL  5-40 mL IntraVENous Q8H    sodium chloride (NS) flush 5-40 mL  5-40 mL IntraVENous PRN    polyethylene glycol (MIRALAX) packet 17 g  17 g Oral DAILY PRN    ondansetron (ZOFRAN ODT) tablet 4 mg  4 mg Oral Q8H PRN    Or    ondansetron (ZOFRAN) injection 4 mg  4 mg IntraVENous Q6H PRN         Objective:      Physical Exam:    Visit Vitals  /64 (BP Patient Position: Sitting)   Pulse (!) 103   Temp 98 °F (36.7 °C)   Resp 20   Ht 5' 9\" (1.753 m)   Wt 61.1 kg (134 lb 11.2 oz)   SpO2 92%   BMI 19.89 kg/m²     Gen:  NAD  Mental Status - Alert. General Appearance - Not in acute distress. Chest and Lung Exam   Inspection: Accessory muscles - No use of accessory muscles in breathing. Auscultation:   Breath sounds: -clear, diminished, no crackles noted today   Cardiovascular   Inspection: Jugular vein - Bilateral - Inspection Normal.   Palpation/Percussion:   Auscultation: Rhythm - Regular. Heart Sounds - S1 WNL and S2 WNL. No S3 or S4. Murmurs & Other Heart Sounds: Auscultation of the heart reveals - No Murmurs. Peripheral Vascular   Upper Extremity: Inspection - Bilateral - No Cyanotic nailbeds or Digital clubbing. Lower Extremity:   Palpation: Edema - Bilateral - No edema. Abdomen:   Soft, non-tender, bowel sounds are active. Neuro: Alert, confused, repeatedly asking the same questions, CN and motor grossly WNL    Data Review:   Recent Labs     05/16/21  0020 05/14/21  1521   WBC 6.6 10.4   HGB 12.1 11.4*   HCT 40.0 34.9*    140*     Recent Labs     05/17/21  0208 05/16/21  0020 05/15/21  1907 05/15/21  1816 05/15/21  0316 05/14/21  1521   * 133*  --  131* 125* 129*   K 4.4 4.0  --   --  3.9 4.0   CL 84* 93*  --   --  88* 89*   CO2 39* 40*  --   --  32 38*   * 113*  --   --  115* 144*   BUN 15 10  --   --  7 7   CREA 0.45* 0.49*  --   --  0.47* 0.59*   CA 8.5 9.0 8.7  --  8.5 8.8   MG  --   --   --   --  2.2 2.1   ALB  --   --   --   --   --  3.4*   TBILI  --   --   --   --   --  0.6   ALT  --   --   --   --   --  44   INR  --   --   --   --   --  1.2*       Recent Labs     05/14/21  1521   TROIQ <0.05         Intake/Output Summary (Last 24 hours) at 5/17/2021 1114  Last data filed at 5/17/2021 0911  Gross per 24 hour   Intake 700 ml   Output 1200 ml   Net -500 ml        Telemetry: AFIB, rate as high as 130's     Assessment:     Principal Problem:    CAP (community acquired pneumonia) (5/14/2021)    Active Problems:    Hypertension (5/31/2014)      Dyslipidemia ()      CHF exacerbation ([facility] (5/14/2021)      Acute hypoxemic respiratory failure (HCC) (5/14/2021)      PAF (paroxysmal atrial fibrillation) ([facility] (5/15/2021)        Plan:     Haim Alexander is a 80 y. o. male admitted for Acute hypoxemic respiratory failure (HCC) [J96.01];CHF exacerbation (HCC) [I50.9];CAP (community acquired pneumonia) [J18.9]. We are consulted for paroxysmal atrial fibrillation with slightly rapid ventricular response. He is known to Dr. Sheila Fierro, and is on p.o. diltiazem and Eliquis at baseline. Last EKG in the office showed sinus rhythm. Today he is in atrial fibrillation, with controlled ventricular rate after a bolus of IV diltiazem. Since heart rate came under control, it was decided not to start a diltiazem drip. Paroxysmal atrial fibrillation:  Rate currently uncontrolled at times, transient, BP is marginal.  Continue PO diltiazem at current dose for now, metoprolol and Eliquis. Considering advanced age, dementia and comorbidities I favor a rate control and anticoagulation strategy rather than rhythm control. Possible diastolic CHF:  LVEF normal, cannot rule out component of diastolic heart failure. Diuretic currently held due to fairly good oxygenation and significant hyponatremia. Hold lasix for today, strict I/O, daily weights, labs; proBNP is elevated at 7000+. Echo still pending    Suspected community-acquired pneumonia:  As per internal medicine. Procalcitonin is normalthis may argue for acute heart failure with preserved ejection fraction greater than pneumonia. Hyponatremia:  As per internal medicine. Improved. Holding lasix for today d/t to + orthostatics. BP dropped from 130's to 102 from sitting to standing. Dyslipidemia:  Continue statin    Hypertension: marginal BP. Currently well controlled, monitor    Orthostatic hypotension:  + from sitting to standing, BP dropped approximately 28 mmHg. Fall precautions  Assist with transfers and ambulation  PT/OT  Consider some IV hydration     Patient seen and examined by me with the above nurse practitioner.   I personally performed all components of the history, physical, and medical decision making and agree with the assessment and plan with minor modifications as noted. Today the patient is orthostatic. General PE  Gen:  NAD  Mental Status - Alert. General Appearance - Not in acute distress. HEENT:  PERRL, no carotid bruits or JVD  Chest and Lung Exam   Inspection: Accessory muscles - No use of accessory muscles in breathing. Auscultation:   Breath sounds: - Normal.   Cardiovascular   Inspection: Jugular vein - Bilateral - Inspection Normal.   Palpation/Percussion:   Apical Impulse: - Normal.   Auscultation: Rhythm - Regular. Heart Sounds - S1 WNL and S2 WNL. No S3 or S4. Murmurs & Other Heart Sounds: Auscultation of the heart reveals - No Murmurs. Peripheral Vascular   Upper Extremity: Inspection - Bilateral - No Cyanotic nailbeds or Digital clubbing. Lower Extremity:   Palpation: Edema - Bilateral - No edema. Abdomen:   Soft, non-tender, bowel sounds are active. Neuro: A&O times 3, CN and motor grossly WNL    Medical management of AF is limited by orthostasis and borderline low blood pressure. Hold Lasix for today. Reassess in the a.m.

## 2021-05-17 NOTE — PROGRESS NOTES
Bedside and Verbal shift change report given to [address] [place] [address] Marlow, RN (oncoming nurse) by Jovanny Concepcion (offgoing nurse). Report given with SBAR, Kardex, Intake/Output, MAR and Recent Results.

## 2021-05-18 ENCOUNTER — APPOINTMENT (OUTPATIENT)
Dept: CT IMAGING | Age: 86
DRG: 193 | End: 2021-05-18
Attending: INTERNAL MEDICINE
Payer: MEDICARE

## 2021-05-18 ENCOUNTER — APPOINTMENT (OUTPATIENT)
Dept: GENERAL RADIOLOGY | Age: 86
DRG: 193 | End: 2021-05-18
Attending: INTERNAL MEDICINE
Payer: MEDICARE

## 2021-05-18 LAB
ALBUMIN SERPL-MCNC: 3 G/DL (ref 3.5–5)
ALBUMIN/GLOB SERPL: 1 {RATIO} (ref 1.1–2.2)
ALP SERPL-CCNC: 78 U/L (ref 45–117)
ALT SERPL-CCNC: 48 U/L (ref 12–78)
ANION GAP SERPL CALC-SCNC: 0 MMOL/L (ref 5–15)
ANION GAP SERPL CALC-SCNC: 1 MMOL/L (ref 5–15)
APPEARANCE UR: CLEAR
ARTERIAL PATENCY WRIST A: ABNORMAL
ARTERIAL PATENCY WRIST A: YES
ARTERIAL PATENCY WRIST A: YES
AST SERPL-CCNC: 27 U/L (ref 15–37)
BACTERIA URNS QL MICRO: NEGATIVE /HPF
BASE EXCESS BLDA CALC-SCNC: 11.3 MMOL/L
BASE EXCESS BLDA CALC-SCNC: 11.9 MMOL/L
BASE EXCESS BLDA CALC-SCNC: 13.6 MMOL/L
BASOPHILS # BLD: 0 K/UL (ref 0–0.1)
BASOPHILS NFR BLD: 0 % (ref 0–1)
BDY SITE: ABNORMAL
BILIRUB DIRECT SERPL-MCNC: 0.2 MG/DL (ref 0–0.2)
BILIRUB SERPL-MCNC: 0.5 MG/DL (ref 0.2–1)
BILIRUB UR QL: NEGATIVE
BNP SERPL-MCNC: 5340 PG/ML
BNP SERPL-MCNC: 6957 PG/ML
BUN SERPL-MCNC: 10 MG/DL (ref 6–20)
BUN SERPL-MCNC: 7 MG/DL (ref 6–20)
BUN/CREAT SERPL: 16 (ref 12–20)
BUN/CREAT SERPL: 30 (ref 12–20)
CALCIUM SERPL-MCNC: 8.6 MG/DL (ref 8.5–10.1)
CALCIUM SERPL-MCNC: 8.7 MG/DL (ref 8.5–10.1)
CHLORIDE SERPL-SCNC: 81 MMOL/L (ref 97–108)
CHLORIDE SERPL-SCNC: 82 MMOL/L (ref 97–108)
CO2 SERPL-SCNC: 43 MMOL/L (ref 21–32)
CO2 SERPL-SCNC: 44 MMOL/L (ref 21–32)
COLOR UR: ABNORMAL
CREAT SERPL-MCNC: 0.33 MG/DL (ref 0.7–1.3)
CREAT SERPL-MCNC: 0.44 MG/DL (ref 0.7–1.3)
DIFFERENTIAL METHOD BLD: ABNORMAL
EOSINOPHIL # BLD: 0 K/UL (ref 0–0.4)
EOSINOPHIL NFR BLD: 0 % (ref 0–7)
EPITH CASTS URNS QL MICRO: ABNORMAL /LPF
ERYTHROCYTE [DISTWIDTH] IN BLOOD BY AUTOMATED COUNT: 12.6 % (ref 11.5–14.5)
ERYTHROCYTE [DISTWIDTH] IN BLOOD BY AUTOMATED COUNT: 12.9 % (ref 11.5–14.5)
FIO2 ON VENT: 40 %
GAS FLOW.O2 O2 DELIVERY SYS: 2.5 L/MIN
GLOBULIN SER CALC-MCNC: 3 G/DL (ref 2–4)
GLUCOSE BLD STRIP.AUTO-MCNC: 111 MG/DL (ref 65–117)
GLUCOSE BLD STRIP.AUTO-MCNC: 133 MG/DL (ref 65–117)
GLUCOSE SERPL-MCNC: 121 MG/DL (ref 65–100)
GLUCOSE SERPL-MCNC: 123 MG/DL (ref 65–100)
GLUCOSE UR STRIP.AUTO-MCNC: NEGATIVE MG/DL
HCO3 BLDA-SCNC: 40 MMOL/L (ref 22–26)
HCO3 BLDA-SCNC: 42 MMOL/L (ref 22–26)
HCO3 BLDA-SCNC: 44 MMOL/L (ref 22–26)
HCT VFR BLD AUTO: 34.2 % (ref 36.6–50.3)
HCT VFR BLD AUTO: 36.8 % (ref 36.6–50.3)
HGB BLD-MCNC: 10.7 G/DL (ref 12.1–17)
HGB BLD-MCNC: 11.5 G/DL (ref 12.1–17)
HGB UR QL STRIP: NEGATIVE
HYALINE CASTS URNS QL MICRO: ABNORMAL /LPF (ref 0–5)
IMM GRANULOCYTES # BLD AUTO: 0 K/UL (ref 0–0.04)
IMM GRANULOCYTES NFR BLD AUTO: 0 % (ref 0–0.5)
INR PPP: 1.1 (ref 0.9–1.1)
IPAP/PIP, IPAPIP: 18
KETONES UR QL STRIP.AUTO: ABNORMAL MG/DL
LEUKOCYTE ESTERASE UR QL STRIP.AUTO: NEGATIVE
LYMPHOCYTES # BLD: 0.5 K/UL (ref 0.8–3.5)
LYMPHOCYTES NFR BLD: 7 % (ref 12–49)
MCH RBC QN AUTO: 30.7 PG (ref 26–34)
MCH RBC QN AUTO: 31.1 PG (ref 26–34)
MCHC RBC AUTO-ENTMCNC: 31.3 G/DL (ref 30–36.5)
MCHC RBC AUTO-ENTMCNC: 31.3 G/DL (ref 30–36.5)
MCV RBC AUTO: 98 FL (ref 80–99)
MCV RBC AUTO: 99.5 FL (ref 80–99)
MONOCYTES # BLD: 0.7 K/UL (ref 0–1)
MONOCYTES NFR BLD: 10 % (ref 5–13)
NEUTS SEG # BLD: 5.3 K/UL (ref 1.8–8)
NEUTS SEG NFR BLD: 83 % (ref 32–75)
NITRITE UR QL STRIP.AUTO: NEGATIVE
NRBC # BLD: 0 K/UL (ref 0–0.01)
NRBC # BLD: 0 K/UL (ref 0–0.01)
NRBC BLD-RTO: 0 PER 100 WBC
NRBC BLD-RTO: 0 PER 100 WBC
PCO2 BLDA: 69 MMHG (ref 35–45)
PCO2 BLDA: 82 MMHG (ref 35–45)
PCO2 BLDA: 85 MMHG (ref 35–45)
PEEP RESPIRATORY: 6 CM[H2O]
PH BLDA: 7.31 [PH] (ref 7.35–7.45)
PH BLDA: 7.34 [PH] (ref 7.35–7.45)
PH BLDA: 7.38 [PH] (ref 7.35–7.45)
PH UR STRIP: 6 [PH] (ref 5–8)
PLATELET # BLD AUTO: 145 K/UL (ref 150–400)
PLATELET # BLD AUTO: 145 K/UL (ref 150–400)
PMV BLD AUTO: 8.9 FL (ref 8.9–12.9)
PMV BLD AUTO: 9.4 FL (ref 8.9–12.9)
PO2 BLDA: 235 MMHG (ref 80–100)
PO2 BLDA: 79 MMHG (ref 80–100)
PO2 BLDA: 91 MMHG (ref 80–100)
POTASSIUM SERPL-SCNC: 4.1 MMOL/L (ref 3.5–5.1)
POTASSIUM SERPL-SCNC: 4.2 MMOL/L (ref 3.5–5.1)
PROCALCITONIN SERPL-MCNC: <0.05 NG/ML
PROT SERPL-MCNC: 6 G/DL (ref 6.4–8.2)
PROT UR STRIP-MCNC: ABNORMAL MG/DL
PROTHROMBIN TIME: 11.5 SEC (ref 9–11.1)
RBC # BLD AUTO: 3.49 M/UL (ref 4.1–5.7)
RBC # BLD AUTO: 3.7 M/UL (ref 4.1–5.7)
RBC #/AREA URNS HPF: ABNORMAL /HPF (ref 0–5)
RBC MORPH BLD: ABNORMAL
RBC MORPH BLD: ABNORMAL
SAO2 % BLD: 95 % (ref 92–97)
SAO2 % BLD: 96 % (ref 92–97)
SAO2 % BLD: 99 % (ref 92–97)
SAO2% DEVICE SAO2% SENSOR NAME: ABNORMAL
SERVICE CMNT-IMP: ABNORMAL
SERVICE CMNT-IMP: NORMAL
SODIUM SERPL-SCNC: 124 MMOL/L (ref 136–145)
SODIUM SERPL-SCNC: 127 MMOL/L (ref 136–145)
SODIUM UR-SCNC: 12 MMOL/L
SP GR UR REFRACTOMETRY: 1.03 (ref 1–1.03)
SPECIMEN SITE: ABNORMAL
UA: UC IF INDICATED,UAUC: ABNORMAL
UROBILINOGEN UR QL STRIP.AUTO: 0.2 EU/DL (ref 0.2–1)
VENTILATION MODE VENT: ABNORMAL
WBC # BLD AUTO: 6.5 K/UL (ref 4.1–11.1)
WBC # BLD AUTO: 6.6 K/UL (ref 4.1–11.1)
WBC URNS QL MICRO: ABNORMAL /HPF (ref 0–4)

## 2021-05-18 PROCEDURE — 95816 EEG AWAKE AND DROWSY: CPT | Performed by: PSYCHIATRY & NEUROLOGY

## 2021-05-18 PROCEDURE — 83880 ASSAY OF NATRIURETIC PEPTIDE: CPT

## 2021-05-18 PROCEDURE — 94660 CPAP INITIATION&MGMT: CPT

## 2021-05-18 PROCEDURE — 84145 PROCALCITONIN (PCT): CPT

## 2021-05-18 PROCEDURE — 4A03X5D MEASUREMENT OF ARTERIAL FLOW, INTRACRANIAL, EXTERNAL APPROACH: ICD-10-PCS | Performed by: STUDENT IN AN ORGANIZED HEALTH CARE EDUCATION/TRAINING PROGRAM

## 2021-05-18 PROCEDURE — 70450 CT HEAD/BRAIN W/O DYE: CPT

## 2021-05-18 PROCEDURE — 74011000258 HC RX REV CODE- 258: Performed by: NURSE PRACTITIONER

## 2021-05-18 PROCEDURE — 80048 BASIC METABOLIC PNL TOTAL CA: CPT

## 2021-05-18 PROCEDURE — 70496 CT ANGIOGRAPHY HEAD: CPT

## 2021-05-18 PROCEDURE — 74011250636 HC RX REV CODE- 250/636: Performed by: INTERNAL MEDICINE

## 2021-05-18 PROCEDURE — 65610000006 HC RM INTENSIVE CARE

## 2021-05-18 PROCEDURE — 36600 WITHDRAWAL OF ARTERIAL BLOOD: CPT

## 2021-05-18 PROCEDURE — 81001 URINALYSIS AUTO W/SCOPE: CPT

## 2021-05-18 PROCEDURE — 2709999900 HC NON-CHARGEABLE SUPPLY

## 2021-05-18 PROCEDURE — 94760 N-INVAS EAR/PLS OXIMETRY 1: CPT

## 2021-05-18 PROCEDURE — 99233 SBSQ HOSP IP/OBS HIGH 50: CPT | Performed by: INTERNAL MEDICINE

## 2021-05-18 PROCEDURE — 85027 COMPLETE CBC AUTOMATED: CPT

## 2021-05-18 PROCEDURE — 74011000250 HC RX REV CODE- 250: Performed by: INTERNAL MEDICINE

## 2021-05-18 PROCEDURE — 80076 HEPATIC FUNCTION PANEL: CPT

## 2021-05-18 PROCEDURE — 85610 PROTHROMBIN TIME: CPT

## 2021-05-18 PROCEDURE — 77010033678 HC OXYGEN DAILY

## 2021-05-18 PROCEDURE — 74011250636 HC RX REV CODE- 250/636: Performed by: NURSE PRACTITIONER

## 2021-05-18 PROCEDURE — P9045 ALBUMIN (HUMAN), 5%, 250 ML: HCPCS | Performed by: NURSE PRACTITIONER

## 2021-05-18 PROCEDURE — 0042T CT CODE NEURO PERF W CBF: CPT

## 2021-05-18 PROCEDURE — 82803 BLOOD GASES ANY COMBINATION: CPT

## 2021-05-18 PROCEDURE — 99223 1ST HOSP IP/OBS HIGH 75: CPT | Performed by: PSYCHIATRY & NEUROLOGY

## 2021-05-18 PROCEDURE — 74011250636 HC RX REV CODE- 250/636: Performed by: STUDENT IN AN ORGANIZED HEALTH CARE EDUCATION/TRAINING PROGRAM

## 2021-05-18 PROCEDURE — 74011000636 HC RX REV CODE- 636: Performed by: INTERNAL MEDICINE

## 2021-05-18 PROCEDURE — 71045 X-RAY EXAM CHEST 1 VIEW: CPT

## 2021-05-18 PROCEDURE — 84300 ASSAY OF URINE SODIUM: CPT

## 2021-05-18 PROCEDURE — 85025 COMPLETE CBC W/AUTO DIFF WBC: CPT

## 2021-05-18 PROCEDURE — 74011000258 HC RX REV CODE- 258: Performed by: STUDENT IN AN ORGANIZED HEALTH CARE EDUCATION/TRAINING PROGRAM

## 2021-05-18 PROCEDURE — 36415 COLL VENOUS BLD VENIPUNCTURE: CPT

## 2021-05-18 PROCEDURE — 51798 US URINE CAPACITY MEASURE: CPT

## 2021-05-18 PROCEDURE — 82962 GLUCOSE BLOOD TEST: CPT

## 2021-05-18 RX ORDER — ALBUMIN HUMAN 50 G/1000ML
12.5 SOLUTION INTRAVENOUS ONCE
Status: COMPLETED | OUTPATIENT
Start: 2021-05-18 | End: 2021-05-18

## 2021-05-18 RX ORDER — LORAZEPAM 2 MG/ML
0.5 INJECTION INTRAMUSCULAR ONCE
Status: COMPLETED | OUTPATIENT
Start: 2021-05-18 | End: 2021-05-18

## 2021-05-18 RX ORDER — DILTIAZEM HYDROCHLORIDE 180 MG/1
180 CAPSULE, COATED, EXTENDED RELEASE ORAL DAILY
Status: DISCONTINUED | OUTPATIENT
Start: 2021-05-18 | End: 2021-05-19

## 2021-05-18 RX ADMIN — ALBUMIN (HUMAN) 12.5 G: 12.5 INJECTION, SOLUTION INTRAVENOUS at 21:10

## 2021-05-18 RX ADMIN — AMIODARONE HYDROCHLORIDE 150 MG: 1.5 INJECTION, SOLUTION INTRAVENOUS at 23:30

## 2021-05-18 RX ADMIN — AMIODARONE HYDROCHLORIDE 1 MG/MIN: 50 INJECTION, SOLUTION INTRAVENOUS at 23:52

## 2021-05-18 RX ADMIN — Medication 10 ML: at 05:24

## 2021-05-18 RX ADMIN — DEXTROSE MONOHYDRATE 5 MG/HR: 50 INJECTION, SOLUTION INTRAVENOUS at 04:49

## 2021-05-18 RX ADMIN — LORAZEPAM 0.5 MG: 2 INJECTION INTRAMUSCULAR; INTRAVENOUS at 12:00

## 2021-05-18 RX ADMIN — CEFTRIAXONE 1 G: 1 INJECTION, POWDER, FOR SOLUTION INTRAMUSCULAR; INTRAVENOUS at 12:33

## 2021-05-18 RX ADMIN — AZITHROMYCIN MONOHYDRATE 500 MG: 500 INJECTION, POWDER, LYOPHILIZED, FOR SOLUTION INTRAVENOUS at 21:10

## 2021-05-18 RX ADMIN — IOPAMIDOL 100 ML: 755 INJECTION, SOLUTION INTRAVENOUS at 11:00

## 2021-05-18 NOTE — PROGRESS NOTES
RAPID RESPONSE TEAM    Responded to overhead rapid response to 1518    Patient just transferred from [address] [place] [address], obtunded, minimally responsive to deep sternal rub. Nursing and RT at bedside. STAT ABG obtained. Patient no longer compensated respiratory acidosis, pH 7.31, CO2 85. Patient not following commands, not appropriate for BIPAP. Dr. Richard Baranrd at bedside. Intensivist consulted and accepted patient. Patient transferred to CCU 2518.       Winnie Perez RN  RRT, [id number]    ABG:    Lab Results   Component Value Date/Time    PH 7.31 (L) 05/18/2021 06:39 PM    PCO2 85 (H) 05/18/2021 06:39 PM    PO2 91 05/18/2021 06:39 PM    HCO3 42 (H) 05/18/2021 06:39 PM       Visit Vitals  /64   Pulse (!) 110   Temp (!) 96.1 °F (35.6 °C)   Resp (!) 40   Ht 5' 9\" (1.753 m)   Wt 62.7 kg (138 lb 3.7 oz)   SpO2 99%   BMI 20.41 kg/m²

## 2021-05-18 NOTE — CONSULTS
Neurology Note    Patient ID:  Swapnil Gonzalez  [id number]  65 y.o.  10/19/1926      Date of Consultation:  May 18, 2021    Referring Physician: Dr. Paul Wagner    Reason for Consultation:  Possible stroke    Subjective:unresponsive       History of Present Illness:   Swapnil Gonzalez is a 80 y.o. male who was initially admitted on May 14, 2021. He was initially admitted to [address] [place] [address] with a pneumonia and he was found to have bouts of intermittent responsiveness and chronic respiratory acidosis and was transferred to [facility].  He has a history of htn, gerd, dyslipidemia, afib, chf.  During the hospital stay he was being followed by cardiology and on antibiotics. He has had some electrolyte abnormalities. This a.m., the patient became more lethargic and was responding with only 1 word responses to deep sternal rub. He was withdrawing all 4 extremities. A neurology code S was called. He did receive a stat head ct with no acute abnormalities. Since then, he continues to be less responsive. There were no convulsions noted.       Past Medical History:   Diagnosis Date    Afib ([facility]     Dilated aortic root (HCC)     Diverticulosis     Dyslipidemia     GERD (gastroesophageal reflux disease)     HTN (hypertension)     Hx of colonic polyps     Hyponatremia 2/23/2021    Mild valvular heart disease     Prostate cancer ([facility] 2014    s/p XRT (proton beam)        Past Surgical History:   Procedure Laterality Date    HX ORTHOPAEDIC      HX UROLOGICAL          Family History   Problem Relation Age of Onset    Stroke Father     Hypertension Father     Cancer Sister         colon    Hypertension Mother         Social History     Tobacco Use    Smoking status: Never Smoker    Smokeless tobacco: Never Used   Substance Use Topics    Alcohol use: No     Comment: quit 20 years ago- states he was a heavy drinker        Allergies   Allergen Reactions    Oxycodone-Acetaminophen Other (comments) Cloudy conscious      Prednisone Other (comments)          Current Facility-Administered Medications   Medication Dose Route Frequency    dilTIAZem (CARDIZEM) 100 mg in dextrose 5% (MBP/ADV) 100 mL infusion  5 mg/hr IntraVENous CONTINUOUS    dilTIAZem ER (CARDIZEM CD) capsule 180 mg  180 mg Oral DAILY    LORazepam (ATIVAN) injection 0.5 mg  0.5 mg IntraVENous ONCE    guaiFENesin ER (MUCINEX) tablet 600 mg  600 mg Oral Q12H    [Held by provider] furosemide (LASIX) tablet 20 mg  20 mg Oral DAILY    levalbuterol (XOPENEX) nebulizer soln 0.63 mg/3 mL  0.63 mg Nebulization Q6H PRN    apixaban (ELIQUIS) tablet 2.5 mg  2.5 mg Oral BID    metoprolol tartrate (LOPRESSOR) tablet 50 mg  50 mg Oral BID    rosuvastatin (CRESTOR) tablet 5 mg  5 mg Oral QHS    azithromycin (ZITHROMAX) 500 mg in 0.9% sodium chloride 250 mL (VIAL-MATE)  500 mg IntraVENous Q24H    sodium chloride (NS) flush 5-40 mL  5-40 mL IntraVENous Q8H    sodium chloride (NS) flush 5-40 mL  5-40 mL IntraVENous PRN    polyethylene glycol (MIRALAX) packet 17 g  17 g Oral DAILY PRN    ondansetron (ZOFRAN ODT) tablet 4 mg  4 mg Oral Q8H PRN    Or    ondansetron (ZOFRAN) injection 4 mg  4 mg IntraVENous Q6H PRN       Review of Systems:      [x]Unable to obtain  ROS due to  [x]mental status change  []sedated   []intubated    Objective:     Visit Vitals  /68   Pulse (!) 120   Temp 97.5 °F (36.4 °C)   Resp 18   Ht 5' 9\" (1.753 m)   Wt 138 lb 3.7 oz (62.7 kg)   SpO2 99%   BMI 20.41 kg/m²       Physical Exam:    Neck: no carotid bruits  Lungs: clear to auscultation  Heart:  irregular rate  Lower extremity: peripheral pulses palpable and no edema  Skin: intact, dry    Neurological exam:    The patient is comatose. There was no verbal output. She was able to be aroused with the noxious stimulation.       Cranial nerves:   II-XII were tested    Perrrla  He does blink to visual threat  Eomi, no evidence of nystagmus  Facial sensation:  normal and symmetric  Facial motor: Symmetric grimace  Tongue: midline without fasciculations    Motor:   Diminished throughout    No evidence of fasciculations    Strength testing:  His mental status prevented formal manual motor testing. He does have a symmetric withdrawal to deep painful stimulation times all 4 extremities    Sensory:  Withdrawal to painful stimulation    Reflexes:  Diminished throughout    Plantar response:  flexor bilaterally      Cerebellar testing:  no tremor apparent    Gait: This was not assessed due to his mental status    Labs:     Lab Results   Component Value Date/Time    Sodium 124 (L) 05/18/2021 03:54 AM    Potassium 4.2 05/18/2021 03:54 AM    Chloride 81 (L) 05/18/2021 03:54 AM    Glucose 123 (H) 05/18/2021 03:54 AM    BUN 10 05/18/2021 03:54 AM    Creatinine 0.33 (L) 05/18/2021 03:54 AM    Calcium 8.6 05/18/2021 03:54 AM    WBC 6.6 05/18/2021 03:54 AM    HCT 34.2 (L) 05/18/2021 03:54 AM    HGB 10.7 (L) 05/18/2021 03:54 AM    PLATELET 003 (L) 92/96/0613 03:54 AM       Imaging:    No results found for this or any previous visit. Results from Hospital Encounter encounter on 05/14/21   CT CODE NEURO PERF W CBF    Narrative CLINICAL HISTORY: Atrial fibrillation. Stroke. EXAMINATION:  CT ANGIOGRAPHY HEAD AND NECK, CT PERFUSION    COMPARISON: Head CT May 14, 2021    TECHNIQUE:  Following the uneventful administration of iodinated contrast  material, axial CT angiography of the head and neck was performed. Delayed axial  images through the head were also obtained. Coronal and sagittal reconstructions  were obtained. Manual postprocessing of images was performed. 3-D  Sagittal  maximal intensity projection images were obtained. 3-D Coronal maximal  intensity projections were obtained. CT brain perfusion was performed with  generation of hemodynamic maps of multiple parameters, including cerebral blood  flow, cerebral blood volume, mean transit time, and TMAX.  CT dose reduction was  achieved through use of a standardized protocol tailored for this examination  and automatic exposure control for dose modulation. This study was analyzed by  the [address] ai algorithm. FINDINGS:    Delayed contrast-enhanced head CT:    The ventricles are midline without hydrocephalus. There is no acute intra or  extra-axial hemorrhage. Scattered periventricular white matter disease  indicative of chronic microvascular ischemia. The basal cisterns are clear. The  paranasal sinuses are clear. CTA NECK:    Great vessels: Four-vessel aortic arch with patent origins. Right subclavian artery: Patent    Left subclavian artery: Patent     Right common carotid artery: Patent     Left common carotid artery: Patent     Cervical right internal carotid artery: Tortuous, patent with no significant  stenosis by NASCET criteria. Cervical left internal carotid artery: Tortuous, patent with no significant  stenosis by NASCET criteria. Right vertebral artery: Patent    Left vertebral artery: Patent    Subpleural scarring in the upper lobes bilaterally. Heterogeneous thyroid gland  with multiple small nodules. CTA HEAD:    Right cavernous internal carotid artery: Patent    Left cavernous internal carotid artery: Patent    Anterior cerebral arteries: Patent    Anterior communicating artery: Patent    Right middle cerebral artery: Patent    Left middle cerebral artery: Patent    Posterior communicating arteries: Patent    Posterior cerebral arteries: Patent    Basilar artery: Patent    Distal vertebral arteries: Patent    No evidence for intracranial aneurysm or hemodynamically significant stenosis. CT Perfusion:  No diagnostic information can be gleaned from the perfusion study due to  technical factors. Impression 1. No evidence of large vessel occlusion or hemodynamically significant  stenosis. 2.  Technically limited perfusion study due to motion without diagnostic  information.        I did independently review the head CT. May 18, 2021. There is a significant amount of atrophy and periventricular microischemic changes. There is no acute abnormalities however motion artifact does limit the ability to interpret the study    CTA with no evidence of flow limiting stenosis    Assessment and Plan:    Patient is a pleasant 80year-old gentleman who was admitted to the hospital with worsening respiratory function. He did have neurological decompensation this a.m. with decreased responsiveness. His neurological examination does reveal a suppressed mental status with no focal weakness that was appreciated. Acute encephalopathy:  Differential for this does include metabolic, systemic impact of infection on neurological function, medication induced, worsening respiratory function causing increased CO2,  seizure, stroke. I would recommend that he receive an EEG. I will place the order for that. Continue to correct underlying metabolic and respiratory abnormalities. If neurological status does not improve, patient will need a brain MRI and additional serology including tsh, ammonia. Neurology will continue to follow along closely.      Principal Problem:    CAP (community acquired pneumonia) (5/14/2021)    Active Problems:    Hypertension (5/31/2014)      Dyslipidemia ()      CHF exacerbation ([facility] (5/14/2021)      Acute hypoxemic respiratory failure ([facility] (5/14/2021)      PAF (paroxysmal atrial fibrillation) ([facility] (5/15/2021)                   Signed By:  Corinne Mattson DO FAAN    May 18, 2021

## 2021-05-18 NOTE — PROGRESS NOTES
5/18/2021 0915 AM     Admit Date: 5/14/2021    Admit Diagnosis:   Acute hypoxemic respiratory failure (HCC) [J96.01];CHF exacerbation (HCC) [I50.9];CAP (community acquired pneumonia) [J18.9]    Subjective:     Bernardine Cotton in not interactive this am. He is laying in bed appears to be comfortable in no acute distress. VSS. Overnight patient HR uncontrolled, cardizem gtt restarted, remains at 5 mg/hr. HR low 100's. BP has normalized.    Sodium 124 this am, ECHO in Feb showed EF 68-30%, mild diastolic dysfunction, may benefit from fluids       Current Facility-Administered Medications   Medication Dose Route Frequency    dilTIAZem (CARDIZEM) 100 mg in dextrose 5% (MBP/ADV) 100 mL infusion  5 mg/hr IntraVENous CONTINUOUS    dilTIAZem ER (CARDIZEM CD) capsule 180 mg  180 mg Oral DAILY    guaiFENesin ER (MUCINEX) tablet 600 mg  600 mg Oral Q12H    [Held by provider] furosemide (LASIX) tablet 20 mg  20 mg Oral DAILY    levalbuterol (XOPENEX) nebulizer soln 0.63 mg/3 mL  0.63 mg Nebulization Q6H PRN    apixaban (ELIQUIS) tablet 2.5 mg  2.5 mg Oral BID    metoprolol tartrate (LOPRESSOR) tablet 50 mg  50 mg Oral BID    rosuvastatin (CRESTOR) tablet 5 mg  5 mg Oral QHS    azithromycin (ZITHROMAX) 500 mg in 0.9% sodium chloride 250 mL (VIAL-MATE)  500 mg IntraVENous Q24H    cefTRIAXone (ROCEPHIN) 1 g in 0.9% sodium chloride (MBP/ADV) 50 mL MBP  1 g IntraVENous Q24H    sodium chloride (NS) flush 5-40 mL  5-40 mL IntraVENous Q8H    sodium chloride (NS) flush 5-40 mL  5-40 mL IntraVENous PRN    polyethylene glycol (MIRALAX) packet 17 g  17 g Oral DAILY PRN    ondansetron (ZOFRAN ODT) tablet 4 mg  4 mg Oral Q8H PRN    Or    ondansetron (ZOFRAN) injection 4 mg  4 mg IntraVENous Q6H PRN         Objective:      Physical Exam:    Visit Vitals  BP (!) 141/73   Pulse 98   Temp 97.9 °F (36.6 °C)   Resp 16   Ht 5' 9\" (1.753 m)   Wt 62.7 kg (138 lb 3.7 oz)   SpO2 91%   BMI 20.41 kg/m²     Gen:  NAD  Mental Status - Alert. General Appearance - Not in acute distress. Chest and Lung Exam   Inspection: Accessory muscles - No use of accessory muscles in breathing. Auscultation:   Breath sounds: -clear, diminished, no crackles noted today   Cardiovascular   Inspection: Jugular vein - Bilateral - Inspection Normal.   Palpation/Percussion:   Auscultation: Rhythm - Irr. Heart Sounds - S1 WNL and S2 WNL. No S3 or S4. Murmurs & Other Heart Sounds: Auscultation of the heart reveals - No Murmurs. Peripheral Vascular   Upper Extremity: Inspection - Bilateral - No Cyanotic nailbeds or Digital clubbing. Lower Extremity:   Palpation: Edema - Bilateral - No edema. Abdomen:   Soft, non-tender, bowel sounds are active. Neuro: Alert, confused, repeatedly asking the same questions, CN and motor grossly WNL    Data Review:   Recent Labs     05/18/21  0354 05/16/21  0020   WBC 6.6 6.6   HGB 10.7* 12.1   HCT 34.2* 40.0   * 156     Recent Labs     05/18/21  0354 05/17/21  0208 05/16/21  0020   * 125* 133*   K 4.2 4.4 4.0   CL 81* 84* 93*   CO2 43* 39* 40*   * 113* 113*   BUN 10 15 10   CREA 0.33* 0.45* 0.49*   CA 8.6 8.5 9.0       No results for input(s): TROIQ, CPK, CKMB in the last 72 hours. Intake/Output Summary (Last 24 hours) at 5/18/2021 1025  Last data filed at 5/17/2021 2020  Gross per 24 hour   Intake 600 ml   Output 1150 ml   Net -550 ml        Telemetry: AFIB, rate low 100's    Assessment:     Principal Problem:    CAP (community acquired pneumonia) (5/14/2021)    Active Problems:    Hypertension (5/31/2014)      Dyslipidemia ()      CHF exacerbation ([facility] (5/14/2021)      Acute hypoxemic respiratory failure ([facility] (5/14/2021)      PAF (paroxysmal atrial fibrillation) ([facility] (5/15/2021)        Plan:     Charlee Gil is a 80 y.o. male admitted for Acute hypoxemic respiratory failure ([address] [J96.01];[facility] exacerbation ([facility] [I50.9];CAP (community acquired pneumonia) [J18.9].    We are consulted for paroxysmal atrial fibrillation with slightly rapid ventricular response. He is known to Dr. Bart Gunn, and is on p.o. diltiazem and Eliquis at baseline. Last EKG in the office showed sinus rhythm. Today he remains in atrial fibrillation, restarted on dilt gtt overnight. Paroxysmal atrial fibrillation:  Rate currently uncontrolled at times, transient, BP has improved. Resume his PO diltiazem at 180 mg daily, metoprolol 50 mg BID, and Eliquis 2.5 mg PO BID. Considering advanced age, dementia and comorbidities I favor a rate control and anticoagulation strategy rather than rhythm control. He was restarted on cardizem gtt overnight, but his PO was DC'd. Will attempt some overlap, so we can hopefully titrate the gtt thoughout the day. Closely monitor BP. May consider cardioversion if unable to control rate and if BP drops again. Possible diastolic CHF:  LVEF normal, cannot rule out component of diastolic heart failure. Diuretic currently held due to fairly good oxygenation and significant hyponatremia. Hold lasix again for today, strict I/O, daily weights, labs; proBNP is elevated at 7000+. Echo shows slight reduction in EF to 50-55%, dilated LA, mod/severe MR, trace TR, trace MS  I/O -1.2 L, weights are inconsistent and not reliable data. No edema noted. Remains on 4L/NC    Suspected community-acquired pneumonia:  As per internal medicine. Procalcitonin is normalthis may argue for acute heart failure with preserved ejection fraction greater than pneumonia. Hyponatremia:  As per internal medicine. Improved. Holding lasix again for today d/t to + orthostatics. BP dropped from 130's to 102 from sitting to standing yesterday    Dyslipidemia:  Continue statin    Hypertension: BP has stabilized. Currently well controlled, monitor    Orthostatic hypotension:  + from sitting to standing, BP dropped approximately 28 mmHg.    Fall precautions  Assist with transfers and ambulation  PT/OT  Consider some IV hydration     Patient seen and examined by me with the above nurse practitioner. I personally performed all components of the history, physical, and medical decision making and agree with the assessment and plan with minor modifications as noted. Today the patient presents with hyponatremia suspected dehydration. Altered mental status this morning with code stroke called but patient able to verbalize after vigorous arousal that he was born in [address] [place] [address] and lives in [place] and move all 4. General PE  Gen:  NAD  Mental Status - Alert. General Appearance - Not in acute distress. HEENT:  PERRL, no carotid bruits or JVD  Chest and Lung Exam   Inspection: Accessory muscles - No use of accessory muscles in breathing. Auscultation:   Breath sounds: - Normal.   Cardiovascular   Inspection: Jugular vein - Bilateral - Inspection Normal.   Palpation/Percussion:   Apical Impulse: - Normal.   Auscultation: Rhythm - irregular. Heart Sounds - S1 WNL and S2 WNL. No S3 or S4. Murmurs & Other Heart Sounds: Auscultation of the heart reveals - No Murmurs. Peripheral Vascular   Upper Extremity: Inspection - Bilateral - No Cyanotic nailbeds or Digital clubbing. Lower Extremity:   Palpation: Edema - Bilateral - No edema. Abdomen:   Soft, non-tender, bowel sounds are active. Neuro: Hypersomnolent but eventually arousable and moves all 4, details deferred to neurology after code stroke    Holding diuretic. Try to manage A. fib with medications. Add amiodarone. Consider SHORTY and cardioversion but he may be too unstable.

## 2021-05-18 NOTE — PROGRESS NOTES
Rapid called on patient's arrival at [address] [facility]. Nursing, RT in room when RR called. Patient obtunded, unresponsive, SpO2 sats 72%. Patient coming to PCU for BiPap. Both RN and RT agree patient is not appropriate for BiPap due to obtunded neuro status, inability to follow commands, too weak to pull off mask. Addendum: Patient accepted to ICU.

## 2021-05-18 NOTE — PROGRESS NOTES
Patient is obtunded and unable to remove face mask on bipap machine. Doctor at bedside said patient is appropriate for bipap. Respiratory advised nursing not to leave the room while patient is on bipap due to inability to pull off for vomitting and risk of aspiration.

## 2021-05-18 NOTE — PROGRESS NOTES
Spiritual Care Assessment/Progress Note  [facility]      NAME: Clay Posey      MRN: [id number]  AGE: 80 y.o. SEX: male  Oriental orthodox Affiliation: Presybeterian   Language: English     5/18/2021     Total Time (in minutes): 25     Spiritual Assessment begun in [facility] through conversation with:         [x]Patient        [x] Family    [] Friend(s)        Reason for Consult: Crisis     Spiritual beliefs: (Please include comment if needed)     [x] Identifies with a chava tradition: Presybeterian        [] Supported by a chava community:            [] Claims no spiritual orientation:           [] Seeking spiritual identity:                [] Adheres to an individual form of spirituality:           [] Not able to assess:                           Identified resources for coping:      [] Prayer                               [] Music                  [] Guided Imagery     [x] Family/friends                 [] Pet visits     [] Devotional reading                         [] Unknown     [] Other:                                               Interventions offered during this visit: (See comments for more details)    Patient Interventions: Crisis     Family/Friend(s):  Affirmation of emotions/emotional suffering, Coping skills reviewed/reinforced, Catharsis/review of pertinent events in supportive environment, Initial Assessment, Life review/legacy     Plan of Care:     [] Support spiritual and/or cultural needs    [] Support AMD and/or advance care planning process      [] Support grieving process   [] Coordinate Rites and/or Rituals    [] Coordination with community clergy   [] No spiritual needs identified at this time   [] Detailed Plan of Care below (See Comments)  [] Make referral to Music Therapy  [] Make referral to Pet Therapy     [] Make referral to Addiction services  [] Make referral to [facility]  [] Make referral to Spiritual Care Partner  [] No future visits requested [x] Follow up upon further referrals     Comments:      responded to Rapid Response Team alert for Mr. Williamson in 3101. Upon arrival, patient was in medical assessment and intervention. Patient's SO Yasemin Rodriguez was at bedside. After medical assessment, Code Stroke LV2 was called. Yasemin Rodriguez was worried about her SO,  provided calm presence and reassured her that he is in good hands. Per Yasemin Rodriguez, they have been together 8-9 years and had wonderful years so far like cruise travel. Patient is transferred to CAT scan. Advised of  availability.      [id number] [facility] Ghulam Powell., M.S., Th.M.  Spiritual Care Provider   Paging Service [phone])

## 2021-05-18 NOTE — CONSULTS
Consult was requested for Hyponatremia. Patient currently on Bipap and is altered. Getting EEG done now. I have ordered urine and serum osmolarity,urine sodium and uric acid. Detail consult to follow. He is getting transferred to PCU. Spoke to [facility].

## 2021-05-18 NOTE — PROGRESS NOTES
RRT was called today because patient lethargic, patient was seen and examined, patient lethargic unresponsive to verbal stimuli, responsive to tactile stimuli, Code S was called, CTA head and neck was ordered, case was discussed with the telemetry neurologist, patient change mental status most likely secondary to metabolic encephalopathy, ABG was done show elevated CO2 level 69, patient was placed on BiPAP, transferred patient to stepdown

## 2021-05-18 NOTE — PROGRESS NOTES
Physical Therapy:    Noted rapid response called 10:35 am... will defer at this time and continue to follow. Noted pt with tachy. This am, and cardiologist consulted.

## 2021-05-18 NOTE — PROGRESS NOTES
Rapid response called as patient was found to be poorly responsive    I had attended to the bed side and patient hemodynamics were stable he was saturating well on Venturi mask      Distant breath sounds  Patient is very hard of hearing but was responding to simple questions.     Blood gases were repeated and the pH was 7.31 PCO2 the 80s    Patient is a full code    The case was discussed with Dr. Stephany Fothergill intensive care    Will be transferred to the ICU for close observation

## 2021-05-18 NOTE — PROCEDURES
EEG REPORT    Patient Name: Jun Hess  : 10/19/1926  Age: 80 y.o. Ordering physician: Dr. Bobby Arellano  Date of EE2021  15:08-15:30  Diagnosis: unresponsive  Interpreting physician: Arnoldo Gray D.O. FAAN    Procedure: EEG    CLINICAL INDICATION: The patient is a 80 y.o. male who is being evaluated for baseline electro cerebral activities and to rule out seizure focus. Current Facility-Administered Medications   Medication Dose Route Frequency    dilTIAZem (CARDIZEM) 100 mg in dextrose 5% (MBP/ADV) 100 mL infusion  5 mg/hr IntraVENous CONTINUOUS    dilTIAZem ER (CARDIZEM CD) capsule 180 mg  180 mg Oral DAILY    guaiFENesin ER (MUCINEX) tablet 600 mg  600 mg Oral Q12H    [Held by provider] furosemide (LASIX) tablet 20 mg  20 mg Oral DAILY    levalbuterol (XOPENEX) nebulizer soln 0.63 mg/3 mL  0.63 mg Nebulization Q6H PRN    apixaban (ELIQUIS) tablet 2.5 mg  2.5 mg Oral BID    metoprolol tartrate (LOPRESSOR) tablet 50 mg  50 mg Oral BID    rosuvastatin (CRESTOR) tablet 5 mg  5 mg Oral QHS    azithromycin (ZITHROMAX) 500 mg in 0.9% sodium chloride 250 mL (VIAL-MATE)  500 mg IntraVENous Q24H    sodium chloride (NS) flush 5-40 mL  5-40 mL IntraVENous Q8H    sodium chloride (NS) flush 5-40 mL  5-40 mL IntraVENous PRN    polyethylene glycol (MIRALAX) packet 17 g  17 g Oral DAILY PRN    ondansetron (ZOFRAN ODT) tablet 4 mg  4 mg Oral Q8H PRN    Or    ondansetron (ZOFRAN) injection 4 mg  4 mg IntraVENous Q6H PRN           DESCRIPTION OF PROCEDURE:     This is a digitally recorded electroencephalogram  Electrodes were applied in accordance with the international 10-20 system of electrode placement. 18 channels of scalp EEG are recorded  A channel was used for EoG  Another channel was used for ECG   The data is stored digitally and reviewed in reformatted montages for optimal display  EEG  was reviewed in both bipolar and referential montages    Description of Activity:     The background of this recording contains no well-formed alpha activity seen. There was diffuse delta activity identified throughout the study. Throughout the recording, there were no clear areas of focal slowing nor spike or spike-and-wave discharges seen. Hyperventilation was not performed. Photic stimulation produced no response in the posterior head regions. During drowsiness, there is attenuation of the underlying  frequency and slower theta activity occurs. Clinical Interpretation: This EEG is abnormal. There is moderate generalized slowing as seen in encephalopathies/diffuse cerebral dysfunction. There is no focal asymmetry, seizures or epileptiform discharges seen. Clinical correlation is recommended             HEAVEN Armenta

## 2021-05-18 NOTE — ROUTINE PROCESS
Bedside and Verbal shift change report given to Tonny Gordon RN (oncoming nurse) by Reynaldo Soria RN (offgoing nurse). Report given with SBAR, Kardex, Intake/Output, MAR and Recent Results.

## 2021-05-18 NOTE — PROGRESS NOTES
Rapid response called at this time. Pt found alert to only physical stimuli and following limit commands.

## 2021-05-18 NOTE — PROGRESS NOTES
Upon am assessment patient responded  verbally when named was called. Eyes remained closed very little interaation. Per report  Pt alert x4  When fully awake.

## 2021-05-18 NOTE — PROGRESS NOTES
CM attempted to complete assessment with the help of pt's friend, Delmar Warrenzen, [phone] but had to leave a message for pt's friend. Nurys Rueda.   Care Manager [address] [facility]  [phone]

## 2021-05-18 NOTE — PROGRESS NOTES
Occupational Therapy    Patient chart reviewed up to date. Patient with Code S called - will defer at this time and follow-up as able and appropriate. Thank you.   Hanna Baron OTR/RITESH

## 2021-05-18 NOTE — PROGRESS NOTES
Transition of Care Plan:    RUR:21%  Disposition:Home with friend  Follow up appointments:PCP and specialist (Neurology)  DME needed:TBD  [facility]portation [facility] [address] or means to access home:   Friend     IM Medicare letter:2nd IMM Letter  Caregiver Contact:Wanda [email or web address]-gpvygi-[phone]  Discharge Caregiver contacted prior to discharge? Reason for Admission:   SOB                    RUR Score:     21%           PCP: First and Last name:   Lisette Christianson MD     Name of Practice:    Are you a current patient: Yes/No: Yes   Approximate date of last visit: A month ago   Can you participate in a virtual visit if needed: No    Do you (patient/family) have any concerns for transition/discharge? Friend has no concerns for transition/discharge              Plan for utilizing home health:   TBD    Current Advanced Directive/Advance Care Plan:  Full Code      Healthcare Decision Maker:   Click here to complete Educents including selection of the Healthcare Decision Maker Relationship (ie \"Primary\")            Primary Decision Maker: Opal Bishop - [phone] (ACP) Conversation      Date of Conversation: 5/18/2021  Conducted with: Christos Benites    Healthcare Decision Maker:     Primary Decision Maker: Opal Bishop - [phone]  Click here to complete Educents including selection of the Healthcare Decision Maker Relationship (ie \"Primary\")      Content/Action Overview:     Reviewed DNR/DNI and patient elects Full Code (Attempt Resuscitation)         Length of Voluntary ACP Conversation in minutes:  <16 minutes (Non-Billable)    Zenia Botello       Transition of Care Plan:          Pt is a 79 yo male who was admitted to [facility] with a dx of SOB.  PMHx includes Afib, dilated aortic root, diverticulosis and etc. CM confirmed demographics with pt's friendKem Code Alberto Collazo. Pt lives with his friend in an one story home with three steps to enter. Pt has a rolling walker but does not use it. He also has a shower chair. No hx with home health services or being admitted to a SNF or inpatient rehab. Pt has a hx of outpatient rehab. Pt's friend transport pt to his medical appointments. Pt uses [facility] in [place]. Pt is independent with his ADLs and IADLs. At the time of d/c pt's friend will transport. CM will continue to follow and assist with d/c planning. Care Management Interventions  PCP Verified by CM: Yes(Marilu Frye)  Mode of Transport at Discharge: Other (see comment)(Friend to transport)  Transition of Care Consult (CM Consult): Discharge Planning(Home with friend)  Discharge Durable Medical Equipment: No(Rolling walker)  Physical Therapy Consult: Yes  Occupational Therapy Consult: Yes  Speech Therapy Consult: No  Current Support Network: Other(Friend is very supportive and involved)  Confirm Follow Up Transport: Friends(Friend transport pt to his medical appointments)  Discharge Location  Discharge Placement: Unable to determine at this time    Nataliia Yeager.   Care Manager [facility]  [phone]

## 2021-05-18 NOTE — PROGRESS NOTES
Jamila Mantilla NP   Patient: Sosa Grewal     5/18/21 2:53 AM   [phone] Hospital or Facility: [facility] From: Pito Haque RE: Bev Archuleta 10/19/1926 RM: 1460 Pt heartrate sustaining in the 120's. Asymptomatic. Cardiology is following.  Need Callback: NO CALLBACK REQ  Read 2:57 AM       5/18/21 2:59 AM   ok thanks       5/18/21 4:35 AM   pt has critical CO2 43  Read 4:39 AM       5/18/21 4:36 AM   per cardiology placed on diltiazem gtt  Read 4:39 AM     5/18/21 4:39 AM   ok thank you

## 2021-05-18 NOTE — ROUTINE PROCESS
{[facility] BEDSIDE_VERBAL_RECORDED_WRITTEN:[id number]::\"Bedside\"} shift change report given to *** Laith Gonzalez) by Felisha Henry). Report included the following information {SBAR REPORTS GHFO:[id number]}.

## 2021-05-18 NOTE — PROGRESS NOTES
Hospitalist Progress Note    NAME: Jun Hess   :  10/19/1926   MRN:  [id number]       Assessment / Plan:  Acute hypoxic hypercapnic respiratory failure most likely secondary to diastolic CHF exacerbation acute on chronic  Community-acquired pneumonia  Transfer patient to stepdown  Start patient on BiPAP  Cardiology consultation  Continue IV antibiotic Rocephin and azithromycin    Change mental status most likely secondary to metabolic encephalopathy  head CT and CTA head and neck was negative for any acute abnormality  Neurology consultation    A. fib with RVR  Cardiology consultation  Start patient on Cardizem drip  Continue Eliquis    Hyponatremiamonitoring  Nephrology consultation    Hypertension  GERD  Hyperlipidemia    Continue home medication        18.5 - 24.9 Normal weight / Body mass index is 20.41 kg/m². Estimated discharge date: May 22  Barriers:    Code status: Full  Prophylaxis: Eliquis   Recommended Disposition: SNF/LTC     Subjective:     Chief Complaint / Reason for Physician Visit    Discussed with RN events overnight. RRT was called today because patient lethargic    Review of Systems:  Symptom Y/N Comments  Symptom Y/N Comments   Fever/Chills    Chest Pain     Poor Appetite    Edema     Cough    Abdominal Pain     Sputum    Joint Pain     SOB/COURTNEY    Pruritis/Rash     Nausea/vomit    Tolerating PT/OT     Diarrhea    Tolerating Diet     Constipation    Other       Could NOT obtain due to:  Lethargic     Objective:     VITALS:   Last 24hrs VS reviewed since prior progress note.  Most recent are:  Patient Vitals for the past 24 hrs:   Temp Pulse Resp BP SpO2   21 1138 97.5 °F (36.4 °C) (!) 120 18 119/68 99 %   21 1045 97.1 °F (36.2 °C) (!) 111 18 131/63 96 %   21 0728 97.9 °F (36.6 °C) 98 16 (!) 141/73 91 %   21 0521   18 139/85 90 %   21 0300 98.6 °F (37 °C) (!) 117 16 133/86 92 %   21 2315 98.7 °F (37.1 °C) 99 16 97/75 90 %   21 2020 98.6 °F (37 °C) (!) 101 18 (!) 118/97    05/17/21 1636 98.1 °F (36.7 °C) 99 18 116/73 92 %       Intake/Output Summary (Last 24 hours) at 5/18/2021 1358  Last data filed at 5/17/2021 2020  Gross per 24 hour   Intake 360 ml   Output 1150 ml   Net -790 ml        I had a face to face encounter and independently examined this patient on 5/18/2021, as outlined below:  PHYSICAL EXAM:  General: Lethargic   EENT:  EOMI. Anicteric sclerae. MMM  Resp:  CTA bilaterally, no wheezing or rales. No accessory muscle use  CV:  IRRegular rhythm,  No edema  GI:  Soft, Non distended, Non tender. +Bowel sounds  Neurologic:  Lethargic    Skin:  No rashes. No jaundice    Reviewed most current lab test results and cultures  YES  Reviewed most current radiology test results   YES  Review and summation of old records today    NO  Reviewed patient's current orders and MAR    YES  PMH/ reviewed - no change compared to H&P  ________________________________________________________________________  Care Plan discussed with:    Comments   Patient     Family  y    RN y    Care Manager     Consultant  y                      Multidiciplinary team rounds were held today with , nursing, pharmacist and clinical coordinator. Patient's plan of care was discussed; medications were reviewed and discharge planning was addressed. ________________________________________________________________________  Total NON critical care TIME:  40  Minutes    Total CRITICAL CARE TIME Spent:   Minutes non procedure based      Comments   >50% of visit spent in counseling and coordination of care y    ________________________________________________________________________  Nohemy Dick MD     Procedures: see electronic medical records for all procedures/Xrays and details which were not copied into this note but were reviewed prior to creation of Plan. LABS:  I reviewed today's most current labs and imaging studies.   Pertinent labs include:  Recent Labs     05/18/21  0354 05/16/21  0020   WBC 6.6 6.6   HGB 10.7* 12.1   HCT 34.2* 40.0   * 156     Recent Labs     05/18/21  0354 05/17/21  0208 05/16/21  0020   * 125* 133*   K 4.2 4.4 4.0   CL 81* 84* 93*   CO2 43* 39* 40*   * 113* 113*   BUN 10 15 10   CREA 0.33* 0.45* 0.49*   CA 8.6 8.5 9.0       Signed:  Sia Hawkins MD

## 2021-05-18 NOTE — PROGRESS NOTES
RAPID RESPONSE TEAM    Responded to overhead rapid response to 3101. On arrival spouse, Dr. Dianna Agee and Dr. Kateri Cowden at bedside. Reportedly during bedside shift change report patient was responding appropriately to voice now progressed to responding to pain. Patient lethargic, responds with one word responses to deep sternal rub, withdraws all four extremities to nailbed pressure. Patient admitted for PNA from [facility]. While in ED was found to have periods of intermittent responsiveness with no focal deficits and also chronic respiratory acidosis with compensatory metabolic alkalosis. Patient has Hx of AFIB and anticoagulated with apixaban 2.5 mg. Na 124 on 4 am labs. [address] initiated by Dr. Dianna Agee. LKW  Unknown  BG   133  PIV  20G R-AC  Apixaban 2.5 mg, last dose yesterday 1800  BP  141/74    1058 Patient in CT. Teleneuro consult placed    1105 Difficulty obtaining CODE NEURO HEAD CT imaging d/t movement. Telephone order received for ativan 0.5 mg.    1125 Patient evaluated via teleneurology by Dr. Liliana Prakash. 1144 Patient back in 3101. VS obtained. PIV left arm infiltrated, cardizem infusing. IV removed, new 20G PIV placed. Pharmacy notified. Recommends ice to infiltration site. Cardizem infusion switched to opposite arm. Patient transferred to stepdown for BIPAP. No further RRT interventions currently indicated. Please call back if needed.       Meagan Batista RN  RRT, L.[id number]     Lab Results   Component Value Date/Time    Sodium 124 (L) 05/18/2021 03:54 AM    Potassium 4.2 05/18/2021 03:54 AM    Chloride 81 (L) 05/18/2021 03:54 AM    CO2 43 (HH) 05/18/2021 03:54 AM    Anion gap 0 (L) 05/18/2021 03:54 AM    Glucose 123 (H) 05/18/2021 03:54 AM    BUN 10 05/18/2021 03:54 AM    Creatinine 0.33 (L) 05/18/2021 03:54 AM    BUN/Creatinine ratio 30 (H) 05/18/2021 03:54 AM    GFR est AA >60 05/18/2021 03:54 AM    GFR est non-AA >60 05/18/2021 03:54 AM    Calcium 8.6 05/18/2021 03:54 AM    Bilirubin, total 0.6 05/14/2021 03:21 PM    Alk. phosphatase 73 05/14/2021 03:21 PM    Protein, total 6.6 05/14/2021 03:21 PM    Albumin 3.4 (L) 05/14/2021 03:21 PM    Globulin 3.2 05/14/2021 03:21 PM    A-G Ratio 1.1 05/14/2021 03:21 PM    ALT (SGPT) 44 05/14/2021 03:21 PM    AST (SGOT) 25 05/14/2021 03:21 PM       ABG:    Lab Results   Component Value Date/Time    PH 7.38 05/18/2021 10:54 AM    PCO2 69 (H) 05/18/2021 10:54 AM    PO2 79 (L) 05/18/2021 10:54 AM    HCO3 40 (H) 05/18/2021 10:54 AM       Patient Vitals for the past 4 hrs:   Temp Pulse Resp BP SpO2   05/18/21 1138 97.5 °F (36.4 °C) (!) 120 18 119/68 99 %   05/18/21 1045 97.1 °F (36.2 °C) (!) 111 18 131/63 96 %       Lab Results   Component Value Date/Time    Glucose 123 (H) 05/18/2021 03:54 AM    Glucose (POC) 133 (H) 05/18/2021 10:42 AM       CT Results (maximum last 3): Results from Hospital Encounter encounter on 05/14/21   CT CODE NEURO PERF W CBF    Narrative CLINICAL HISTORY: Atrial fibrillation. Stroke. EXAMINATION:  CT ANGIOGRAPHY HEAD AND NECK, CT PERFUSION    COMPARISON: Head CT May 14, 2021    TECHNIQUE:  Following the uneventful administration of iodinated contrast  material, axial CT angiography of the head and neck was performed. Delayed axial  images through the head were also obtained. Coronal and sagittal reconstructions  were obtained. Manual postprocessing of images was performed. 3-D  Sagittal  maximal intensity projection images were obtained. 3-D Coronal maximal  intensity projections were obtained. CT brain perfusion was performed with  generation of hemodynamic maps of multiple parameters, including cerebral blood  flow, cerebral blood volume, mean transit time, and TMAX. CT dose reduction was  achieved through use of a standardized protocol tailored for this examination  and automatic exposure control for dose modulation. This study was analyzed by  the [address] ai algorithm.     FINDINGS:    Delayed contrast-enhanced head CT:    The ventricles are midline without hydrocephalus. There is no acute intra or  extra-axial hemorrhage. Scattered periventricular white matter disease  indicative of chronic microvascular ischemia. The basal cisterns are clear. The  paranasal sinuses are clear. CTA NECK:    Great vessels: Four-vessel aortic arch with patent origins. Right subclavian artery: Patent    Left subclavian artery: Patent     Right common carotid artery: Patent     Left common carotid artery: Patent     Cervical right internal carotid artery: Tortuous, patent with no significant  stenosis by NASCET criteria. Cervical left internal carotid artery: Tortuous, patent with no significant  stenosis by NASCET criteria. Right vertebral artery: Patent    Left vertebral artery: Patent    Subpleural scarring in the upper lobes bilaterally. Heterogeneous thyroid gland  with multiple small nodules. CTA HEAD:    Right cavernous internal carotid artery: Patent    Left cavernous internal carotid artery: Patent    Anterior cerebral arteries: Patent    Anterior communicating artery: Patent    Right middle cerebral artery: Patent    Left middle cerebral artery: Patent    Posterior communicating arteries: Patent    Posterior cerebral arteries: Patent    Basilar artery: Patent    Distal vertebral arteries: Patent    No evidence for intracranial aneurysm or hemodynamically significant stenosis. CT Perfusion:  No diagnostic information can be gleaned from the perfusion study due to  technical factors. Impression 1. No evidence of large vessel occlusion or hemodynamically significant  stenosis. 2.  Technically limited perfusion study due to motion without diagnostic  information. CT CODE NEURO HEAD WO CONTRAST    Narrative EXAMINATION:  CT CODE NEURO HEAD WO CONTRAST    CLINICAL INFORMATION:  change mental status  COMPARISON:  5/14/2021   TECHNIQUE: Routine axial head CT was performed. IV contrast was not  administered.    Sagittal and coronal reconstructions were generated. CT dose reduction was achieved through use of a standardized protocol tailored  for this examination and automatic exposure control for dose modulation. The study is compromised by patient motion. Multiple repeat images were  obtained. Since images were repeated, the study is of good diagnostic quality. However, the reformatted images are degraded by motion. FINDINGS:  No acute infarct, hemorrhage or mass. VENTRICULAR SYSTEM:  Normal for age. BASAL CISTERNS:  Patent. BRAIN PARENCHYMA:  Hypodensity of the cerebral white matter, consistent with  intracranial small vessel disease. MIDLINE SHIFT:  None. CALVARIUM/ SKULL BASE: Intact. PARANASAL SINUSES AND MASTOID AIR CELLS: Clear. VISUALIZED ORBITS: No significant abnormalities. SELLA: No enlargement. Impression No acute intracranial abnormalities. CTA CODE NEURO HEAD AND NECK W CONT    Narrative CLINICAL HISTORY: Atrial fibrillation. Stroke. EXAMINATION:  CT ANGIOGRAPHY HEAD AND NECK, CT PERFUSION    COMPARISON: Head CT May 14, 2021    TECHNIQUE:  Following the uneventful administration of iodinated contrast  material, axial CT angiography of the head and neck was performed. Delayed axial  images through the head were also obtained. Coronal and sagittal reconstructions  were obtained. Manual postprocessing of images was performed. 3-D  Sagittal  maximal intensity projection images were obtained. 3-D Coronal maximal  intensity projections were obtained. CT brain perfusion was performed with  generation of hemodynamic maps of multiple parameters, including cerebral blood  flow, cerebral blood volume, mean transit time, and TMAX. CT dose reduction was  achieved through use of a standardized protocol tailored for this examination  and automatic exposure control for dose modulation. This study was analyzed by  the [address] ai algorithm.     FINDINGS:    Delayed contrast-enhanced head CT:    The ventricles are midline without hydrocephalus. There is no acute intra or  extra-axial hemorrhage. Scattered periventricular white matter disease  indicative of chronic microvascular ischemia. The basal cisterns are clear. The  paranasal sinuses are clear. CTA NECK:    Great vessels: Four-vessel aortic arch with patent origins. Right subclavian artery: Patent    Left subclavian artery: Patent     Right common carotid artery: Patent     Left common carotid artery: Patent     Cervical right internal carotid artery: Tortuous, patent with no significant  stenosis by NASCET criteria. Cervical left internal carotid artery: Tortuous, patent with no significant  stenosis by NASCET criteria. Right vertebral artery: Patent    Left vertebral artery: Patent    Subpleural scarring in the upper lobes bilaterally. Heterogeneous thyroid gland  with multiple small nodules. CTA HEAD:    Right cavernous internal carotid artery: Patent    Left cavernous internal carotid artery: Patent    Anterior cerebral arteries: Patent    Anterior communicating artery: Patent    Right middle cerebral artery: Patent    Left middle cerebral artery: Patent    Posterior communicating arteries: Patent    Posterior cerebral arteries: Patent    Basilar artery: Patent    Distal vertebral arteries: Patent    No evidence for intracranial aneurysm or hemodynamically significant stenosis. CT Perfusion:  No diagnostic information can be gleaned from the perfusion study due to  technical factors. Impression 1. No evidence of large vessel occlusion or hemodynamically significant  stenosis. 2.  Technically limited perfusion study due to motion without diagnostic  information.

## 2021-05-18 NOTE — PROGRESS NOTES
Message sent to NP Purveyor regarding pt afib hr sustaining in the 120's, asymptomatic. Cardiology also notified and on call cardiologist placed new orders.

## 2021-05-18 NOTE — PROGRESS NOTES
Report given to Brandi Woods [facility]. Awaiting transport.  Pt transferred to [id number]

## 2021-05-19 ENCOUNTER — APPOINTMENT (OUTPATIENT)
Dept: GENERAL RADIOLOGY | Age: 86
DRG: 193 | End: 2021-05-19
Attending: NURSE PRACTITIONER
Payer: MEDICARE

## 2021-05-19 PROBLEM — R53.81 PHYSICAL DEBILITY: Status: ACTIVE | Noted: 2021-05-19

## 2021-05-19 PROBLEM — R63.0 POOR APPETITE: Status: ACTIVE | Noted: 2021-05-19

## 2021-05-19 PROBLEM — E44.0 MODERATE PROTEIN-CALORIE MALNUTRITION (HCC): Status: ACTIVE | Noted: 2021-05-19

## 2021-05-19 PROBLEM — Z71.89 GOALS OF CARE, COUNSELING/DISCUSSION: Status: ACTIVE | Noted: 2021-05-19

## 2021-05-19 LAB
ANION GAP SERPL CALC-SCNC: 1 MMOL/L (ref 5–15)
BASOPHILS # BLD: 0 K/UL (ref 0–0.1)
BASOPHILS NFR BLD: 0 % (ref 0–1)
BUN SERPL-MCNC: 7 MG/DL (ref 6–20)
BUN/CREAT SERPL: 22 (ref 12–20)
CALCIUM SERPL-MCNC: 8.6 MG/DL (ref 8.5–10.1)
CHLORIDE SERPL-SCNC: 86 MMOL/L (ref 97–108)
CO2 SERPL-SCNC: 43 MMOL/L (ref 21–32)
CREAT SERPL-MCNC: 0.32 MG/DL (ref 0.7–1.3)
DIFFERENTIAL METHOD BLD: ABNORMAL
EOSINOPHIL # BLD: 0 K/UL (ref 0–0.4)
EOSINOPHIL NFR BLD: 0 % (ref 0–7)
ERYTHROCYTE [DISTWIDTH] IN BLOOD BY AUTOMATED COUNT: 12.7 % (ref 11.5–14.5)
GLUCOSE BLD STRIP.AUTO-MCNC: 168 MG/DL (ref 65–117)
GLUCOSE SERPL-MCNC: 90 MG/DL (ref 65–100)
HCT VFR BLD AUTO: 30.2 % (ref 36.6–50.3)
HGB BLD-MCNC: 9.5 G/DL (ref 12.1–17)
IMM GRANULOCYTES # BLD AUTO: 0 K/UL (ref 0–0.04)
IMM GRANULOCYTES NFR BLD AUTO: 0 % (ref 0–0.5)
LYMPHOCYTES # BLD: 0.5 K/UL (ref 0.8–3.5)
LYMPHOCYTES NFR BLD: 8 % (ref 12–49)
MAGNESIUM SERPL-MCNC: 2.1 MG/DL (ref 1.6–2.4)
MCH RBC QN AUTO: 31.3 PG (ref 26–34)
MCHC RBC AUTO-ENTMCNC: 31.5 G/DL (ref 30–36.5)
MCV RBC AUTO: 99.3 FL (ref 80–99)
MONOCYTES # BLD: 0.6 K/UL (ref 0–1)
MONOCYTES NFR BLD: 10 % (ref 5–13)
NEUTS SEG # BLD: 5.2 K/UL (ref 1.8–8)
NEUTS SEG NFR BLD: 81 % (ref 32–75)
NRBC # BLD: 0 K/UL (ref 0–0.01)
NRBC BLD-RTO: 0 PER 100 WBC
PHOSPHATE SERPL-MCNC: 2.3 MG/DL (ref 2.6–4.7)
PLATELET # BLD AUTO: 133 K/UL (ref 150–400)
PMV BLD AUTO: 9.4 FL (ref 8.9–12.9)
POTASSIUM SERPL-SCNC: 3.9 MMOL/L (ref 3.5–5.1)
RBC # BLD AUTO: 3.04 M/UL (ref 4.1–5.7)
SERVICE CMNT-IMP: ABNORMAL
SODIUM SERPL-SCNC: 130 MMOL/L (ref 136–145)
WBC # BLD AUTO: 6.4 K/UL (ref 4.1–11.1)

## 2021-05-19 PROCEDURE — 80048 BASIC METABOLIC PNL TOTAL CA: CPT

## 2021-05-19 PROCEDURE — 36415 COLL VENOUS BLD VENIPUNCTURE: CPT

## 2021-05-19 PROCEDURE — 85025 COMPLETE CBC W/AUTO DIFF WBC: CPT

## 2021-05-19 PROCEDURE — 99233 SBSQ HOSP IP/OBS HIGH 50: CPT | Performed by: INTERNAL MEDICINE

## 2021-05-19 PROCEDURE — 71045 X-RAY EXAM CHEST 1 VIEW: CPT

## 2021-05-19 PROCEDURE — 82962 GLUCOSE BLOOD TEST: CPT

## 2021-05-19 PROCEDURE — P9045 ALBUMIN (HUMAN), 5%, 250 ML: HCPCS | Performed by: NURSE PRACTITIONER

## 2021-05-19 PROCEDURE — 74011250637 HC RX REV CODE- 250/637: Performed by: INTERNAL MEDICINE

## 2021-05-19 PROCEDURE — 83735 ASSAY OF MAGNESIUM: CPT

## 2021-05-19 PROCEDURE — 84100 ASSAY OF PHOSPHORUS: CPT

## 2021-05-19 PROCEDURE — 99223 1ST HOSP IP/OBS HIGH 75: CPT | Performed by: NURSE PRACTITIONER

## 2021-05-19 PROCEDURE — 74011250637 HC RX REV CODE- 250/637: Performed by: STUDENT IN AN ORGANIZED HEALTH CARE EDUCATION/TRAINING PROGRAM

## 2021-05-19 PROCEDURE — 65270000015 HC RM PRIVATE ONCOLOGY

## 2021-05-19 PROCEDURE — 74011250636 HC RX REV CODE- 250/636: Performed by: NURSE PRACTITIONER

## 2021-05-19 RX ORDER — ALBUMIN HUMAN 50 G/1000ML
12.5 SOLUTION INTRAVENOUS ONCE
Status: COMPLETED | OUTPATIENT
Start: 2021-05-19 | End: 2021-05-19

## 2021-05-19 RX ORDER — ACETAMINOPHEN 325 MG/1
650 TABLET ORAL
Status: DISCONTINUED | OUTPATIENT
Start: 2021-05-19 | End: 2021-05-20 | Stop reason: HOSPADM

## 2021-05-19 RX ORDER — METOPROLOL TARTRATE 25 MG/1
25 TABLET, FILM COATED ORAL 2 TIMES DAILY
Status: DISCONTINUED | OUTPATIENT
Start: 2021-05-19 | End: 2021-05-20 | Stop reason: HOSPADM

## 2021-05-19 RX ORDER — AMIODARONE HYDROCHLORIDE 200 MG/1
400 TABLET ORAL 2 TIMES DAILY
Status: DISCONTINUED | OUTPATIENT
Start: 2021-05-19 | End: 2021-05-20 | Stop reason: HOSPADM

## 2021-05-19 RX ADMIN — Medication 10 ML: at 21:39

## 2021-05-19 RX ADMIN — APIXABAN 2.5 MG: 2.5 TABLET, FILM COATED ORAL at 17:46

## 2021-05-19 RX ADMIN — AMIODARONE HYDROCHLORIDE 400 MG: 200 TABLET ORAL at 17:45

## 2021-05-19 RX ADMIN — Medication 10 ML: at 14:13

## 2021-05-19 RX ADMIN — ACETAMINOPHEN 650 MG: 325 TABLET ORAL at 15:12

## 2021-05-19 RX ADMIN — METOPROLOL TARTRATE 25 MG: 25 TABLET, FILM COATED ORAL at 08:38

## 2021-05-19 RX ADMIN — METOPROLOL TARTRATE 25 MG: 25 TABLET, FILM COATED ORAL at 17:45

## 2021-05-19 RX ADMIN — PHENYLEPHRINE HYDROCHLORIDE 30 MCG/MIN: 10 INJECTION INTRAVENOUS at 03:45

## 2021-05-19 RX ADMIN — APIXABAN 2.5 MG: 2.5 TABLET, FILM COATED ORAL at 13:42

## 2021-05-19 RX ADMIN — AMIODARONE HYDROCHLORIDE 400 MG: 200 TABLET ORAL at 08:38

## 2021-05-19 RX ADMIN — ALBUMIN (HUMAN) 12.5 G: 2.5 SOLUTION INTRAVENOUS at 01:00

## 2021-05-19 NOTE — PROGRESS NOTES
Came to see the patient. His sodium has now improved. Spoke to CCM today,I was advised that goals of care are being addressed and Renal eval is no longer needed in view of normalization of sodium. Renal Consult has been cancelled. Thank you for letting us participate in care of this patient.

## 2021-05-19 NOTE — PROGRESS NOTES
Spiritual Care Assessment/Progress Note  [facility]      NAME: Imelda Carvalho      MRN: [id number]  AGE: 80 y.o. SEX: male  Episcopal Affiliation: Spiritism   Language: English     5/19/2021     Total Time (in minutes): 15     Spiritual Assessment begun in [facility]ICAL [facility] 3 through conversation with:         [x]Patient        [x] Family    [] Friend(s)        Reason for Consult: Palliative Care, Initial/Spiritual Assessment, Follow-up, critical, Family care     Spiritual beliefs: (Please include comment if needed)     [x] Identifies with a chava tradition: Spiritism        [] Supported by a chava community:            [] Claims no spiritual orientation:           [] Seeking spiritual identity:                [] Adheres to an individual form of spirituality:           [] Not able to assess:                           Identified resources for coping:      [x] Prayer                               [] Music                  [] Guided Imagery     [x] Family/friends                 [] Pet visits     [] Devotional reading                         [] Unknown     [] Other:                                               Interventions offered during this visit: (See comments for more details)    Patient Interventions: Affirmation of emotions/emotional suffering, Life review/legacy, Normalization of emotional/spiritual concerns, End of life issues discussed, Coping skills reviewed/reinforced, Catharsis/review of pertinent events in supportive environment     Family/Friend(s):  Affirmation of emotions/emotional suffering, Coping skills reviewed/reinforced, End of life issues discussed, Life review/legacy, Normalization of emotional/spiritual concerns, Catharsis/review of pertinent events in supportive environment     Plan of Care:     [] Support spiritual and/or cultural needs    [] Support AMD and/or advance care planning process      [] Support grieving process   [] Coordinate Rites and/or Rituals    [] Coordination with community clergy   [] No spiritual needs identified at this time   [] Detailed Plan of Care below (See Comments)  [] Make referral to Music Therapy  [] Make referral to Pet Therapy     [] Make referral to Addiction services  [] Make referral to [facility]  [] Make referral to Spiritual Care Partner  [] No future visits requested        [x] Follow up upon further referrals     Comments:    made a follow-up/Initial Palliative Care assessment visit for Mr. Ritika Hernandez in 2518. Patient was alert, his SO was present during the visit.  explored how they are coping with current crisis, they remembered  since  provided support when the pt had Code Stroke yesterday. Pt shared his wish that when he die he wants to be at home,  affirmed his wish and they are planning to go home tomorrow.  also affirmed Wanda's supportive presence for him. They were thankful for 's presence and Father Elvin Lee' ministry this morning.       [id number] [facility], MPavan., M.S., Th.M.  Spiritual Care Provider   Paging Service [phone])

## 2021-05-19 NOTE — PROGRESS NOTES
[facility]    ICU TEAM Progress Note    Name: Warden Locke   : 10/19/1926   MRN: [id number]   Date: 2021      Subjective:   Progress Note: 2021      80 y.o. male who presented on  with shortness of breath and a-fib with rate uncontrolled on home medications. Past medical history of hypertension, GERD, prostate cancer, hyperlipidemia, A. fib, diastolic CHF. Admitted to medicine for acute hypoxic respiratory failure 2/2 to pulmonary edema in the setting of diastolic CHF + community acquired PNA. Started on lasix 40mg daily, ceftriaxone, and azithromycin. Also started on diltiazem gtt for A-fib with RVR. C/C/b hyponatremia and AMS. RRT called on  for AMS, CT and CTA head negative for acute stroke, eeg negative for seizures. ICU consulted for upgrade 2/2 to hypercapnic respiratory failure requiring Bipap and increased somulence. Upon arrival to the ICU, pt lethargic but arousable,  A&O x3 (name, place, and year but not situation), CAM-ICU positive. Of note, pt got ativan for his Head CT around 12pm.  Repeat ABG, BMP, BNP, CBC, procal sent. I had a phone conversation with pt's significant other Brennan Anne and pt's nephew Avni Wood who stated pt has living will and would want to be DNR/DNI (see ACP note for further details).       Active Problem List:     Problem List  Date Reviewed: 3/4/2021          Codes Class    PAF (paroxysmal atrial fibrillation) (HCC) ICD-10-CM: I48.0  ICD-9-CM: 427.31         * (Principal) CAP (community acquired pneumonia) ICD-10-CM: J18.9  ICD-9-CM: 5         CHF exacerbation ([facility] ICD-10-CM: I50.9  ICD-9-CM: 428.0         Acute hypoxemic respiratory failure (HCC) ICD-10-CM: J96.01  ICD-9-CM: 518.81         Dilated aortic root (HCC) ICD-10-CM: U96.263  ICD-9-CM: 447.71         Mild valvular heart disease ICD-10-CM: I38  ICD-9-CM: 424.90         Dyslipidemia ICD-10-CM: E78.5  ICD-9-CM: 272.4         GERD (gastroesophageal reflux disease) ICD-10-CM: K21.9  ICD-9-CM: 530.81         Hyponatremia ICD-10-CM: E87.1  ICD-9-CM: 276.1         Diverticulosis of large intestine without hemorrhage ICD-10-CM: K57.30  ICD-9-CM: 562.10         History of total right knee replacement ICD-10-CM: Q25.323  ICD-9-CM: V43.65         Gallstones ICD-10-CM: K80.20  ICD-9-CM: 574.20         History of atrial fibrillation ICD-10-CM: Z86.79  ICD-9-CM: V12.59         Hypertension ICD-10-CM: I10  ICD-9-CM: 401.9         Prostate cancer ([facility] ICD-10-CM: C61  ICD-9-CM: 185               Past Medical History:      has a past medical history of Afib ([facility] [facility] Dilated aortic root ([facility] [address] Diverticulosis, Dyslipidemia, GERD (gastroesophageal reflux disease), HTN (hypertension), colonic polyps, Hyponatremia (2/23/2021), Mild valvular heart disease, and Prostate cancer ([facility] [address] (2014). Past Surgical History:      has a past surgical history that includes hx orthopaedic and hx urological.    Home Medications:     Prior to Admission medications    Medication Sig Start Date End Date Taking? Authorizing Provider   krill oil 500 mg cap Take 1 Cap by mouth daily (with breakfast). Yes Zina, MD Angi   cholecalciferol (Vitamin D3) 25 mcg (1,000 unit) cap Take 1,000 Units by mouth daily. Yes Zina, MD Angi   dilTIAZem ER (CARDIZEM LA) 180 mg tablet Take 1 Tab by mouth daily. 1/30/21  Yes Sandra Hernandez MD   rosuvastatin (CRESTOR) 5 mg tablet Take 1 Tab by mouth nightly. 1/18/21  Yes Provider, Historical   metoprolol tartrate (LOPRESSOR) 50 mg tablet Take 1 Tab by mouth two (2) times a day. 1/19/21  Yes Elkin Conn MD   apixaban (Eliquis) 2.5 mg tablet Take 1 Tab by mouth two (2) times a day. 1/19/21  Yes Elkin Conn MD   omeprazole (PRILOSEC) 10 mg capsule Take 10 mg by mouth daily. Other, MD Angi   silodosin (RAPAFLO) 4 mg capsule Take 4 mg by mouth daily (with breakfast). Angi Izaguirre MD   polyethylene glycol (Miralax) 17 gram/dose powder Take 17 g by mouth. Provider, Historical   antiox #8/om3/dha/epa/lut/zeax (PRESERVISION AREDS 2, OMEGA-3, PO) Take 1 Cap by mouth two (2) times a day. Provider, Historical       Allergies/Social/Family History: Allergies   Allergen Reactions    Oxycodone-Acetaminophen Other (comments)     Cloudy conscious      Prednisone Other (comments)      Social History     Tobacco Use    Smoking status: Never Smoker    Smokeless tobacco: Never Used   Substance Use Topics    Alcohol use: No     Comment: quit 20 years ago- states he was a heavy drinker      Family History   Problem Relation Age of Onset   Barbie Burns Stroke Father     Hypertension Father     Cancer Sister         colon    Hypertension Mother        Review of Systems:     Review of systems not obtained due to patient factors. Objective:   Vital Signs:  Visit Vitals  BP (!) 80/48   Pulse (!) 103   Temp 96.8 °F (36 °C)   Resp 19   Ht 5' 9\" (1.753 m)   Wt 62.7 kg (138 lb 3.7 oz)   SpO2 97%   BMI 20.41 kg/m²    O2 Flow Rate (L/min): 2 l/min O2 Device: BIPAP Temp (24hrs), Av.4 °F (36.3 °C), Min:96.1 °F (35.6 °C), Max:98.7 °F (37.1 °C)           Intake/Output:     Intake/Output Summary (Last 24 hours) at 2021  Last data filed at 2021  Gross per 24 hour   Intake 76.92 ml   Output 400 ml   Net -323.08 ml       Physical Exam:    General:  alert, cooperative, no distress, appears stated age  Eye:  conjunctivae/corneas clear. PERRL,   Neurologic:  no focal deficits  Lymphatic:  Cervical, supraclavicular, and axillary nodes normal.   Neck:  normal and no erythema or exudates noted. Lungs:  clear to auscultation bilaterally  Heart:  irregular rate and rhythm, S1, S2 normal, no murmur, click, rub or gallop  Abdomen:  soft, non-tender.  Bowel sounds normal. No masses,  no organomegaly  Cardiovascular:  S1S2 present, without murmur or extra heart sounds, pedal pulses normal, no edema and irregular rate and rhythm  Skin:  Normal.    LABS AND  DATA: Personally reviewed  Recent Labs     05/18/21 1944 05/18/21 0354   WBC 6.5 6.6   HGB 11.5* 10.7*   HCT 36.8 34.2*   * 145*     Recent Labs     05/18/21 1944 05/18/21  0354   * 124*   K 4.1 4.2   CL 82* 81*   CO2 44* 43*   BUN 7 10   CREA 0.44* 0.33*   * 123*   CA 8.7 8.6     Recent Labs     05/18/21 1944   AP 78   TP 6.0*   ALB 3.0*   GLOB 3.0     Recent Labs     05/18/21 1944   INR 1.1   PTP 11.5*      No results for input(s): PHI, PCO2I, PO2I, FIO2I in the last 72 hours. No results for input(s): CPK, CKMB, TROIQ, BNPP in the last 72 hours. Hemodynamics:   PAP:   CO:     Wedge:   CI:     CVP:    SVR:       PVR:       Ventilator Settings:  Mode Rate Tidal Volume Pressure FiO2 PEEP            40 %       Peak airway pressure:      Minute ventilation: 7.5 l/min        MEDS: Reviewed    Chest X-Ray: personally reviewed and report checked    Echo 5/16/21  Result status: Final result   · LV: Estimated LVEF is 50 - 55%. Normal cavity size and systolic function (ejection fraction normal). Moderate concentric hypertrophy. · LA: Dilated left atrium. · AV: Mild aortic valve leaflet calcification present. · MV: Moderate to severe mitral valve regurgitation is present. PISA calculations were not performed. · AO: Mild ascending aorta dilatation. Ascending aorta diameter = 4.6 cm. Assessment and plan:     ICU PABCDEF Bundle/Checklist  Pain Medications: None  Target RASS: 0 - Alert & Calm - Spontaneously pays attention to caregiver  Sedation Medications: None  CAM-ICU:  Positive  Mobility: Poor  PT/OT: will order for the am if remains stable o/n   Restraints: None needed at this time  Discussed Plan of Care (goals of care):  Yes  Addressed Code Status: Do Not Resuscitate    Acute hypoxic and hypercapnic respiratory failure 2/2 pulmonary edema in the setting of diastolic heart dysfunction and mild interstitial edema seen on CXR  -lasix held today in the setting of hypovolemic hyponatremia (pt net negative 1.5 L since admission with poor po intake); however, pt may be intravascularly dry but with interstitial edema given pro-BNP is 6957 and mild pulmonary edema seen on CXR today. Will consider lasix with albumin if ABG does not improve.  -Hypercapnia may also be do to hypoventilation 2/2 lethargy in the setting of getting Ativan during head CT- would not give any more benzodiazepines  -cont Bipap, will wean in the am  -CXR in AM    AMS 2/2 to acute metabolic encephalopathy +/- delerium  -CT and CTA head neg for stroke  -eeg negative for seizure  -CAM-ICU positive  -frequent reorientation  -would not given any more ativan    A fib RVR  -pt became hypotensive on Cardizem gtt (MAPs high 50's), will d/c Cardizem gtt and start amio bolus and gtt  -cont eliquis    Oliguria with preserved kidney function likely 2/2 hypovolemia  -no urine output documented for >12 hours, bladder scan showed 350 which is <30cc/hr  -gentle hydration as pt also has elevated pro-bnp,will start with 12.5 g of 5% albumin  -if still no urine output will placed a hendrix for strict I/os    Hyponatremia likely 2/2 to hypovolemic hyponatremia  -now improving, Na 125-->124-->127  -f/u serum osmolality and urine osmolality and chemistry  -cont to hold lasix for now, may have to restart if ABG does not improve or pulmonary edema worsens    ANTIBIOTICS  Antibiotics:  Completed course of ceftriaxone and azithromycin, no need for further antibiotics- no leukocytosis or fever, procal negative    T/L/D  Tubes: None  Lines: Peripheral IV  Drains: None    SPECIAL EQUIPMENT  None    DISPOSITION  Stay in ICU    CRITICAL CARE CONSULTANT NOTE  I had a face to face encounter with the patient, reviewed and interpreted patient data including clinical events, labs, images, vital signs, I/O's, and examined patient.   I have discussed the case and the plan and management of the patient's care with the consulting services, the bedside nurses and the respiratory therapist.      NOTE OF PERSONAL INVOLVEMENT IN CARE   This patient has a high probability of imminent, clinically significant deterioration, which requires the highest level of preparedness to intervene urgently. I participated in the decision-making and personally managed or directed the management of the following life and organ supporting interventions that required my frequent assessment to treat or prevent imminent deterioration. I personally spent 55 minutes of critical care time. This is time spent at this critically ill patient's bedside actively involved in patient care as well as the coordination of care and discussions with the patient's family. This does not include any procedural time which has been billed separately.     Janae Mazariegos NP  [facility]  5/18/2021

## 2021-05-19 NOTE — PROGRESS NOTES
1945: TRANSFER - IN REPORT:    Verbal report received from [facility] [employer] (name) on [employer]  being received from PCU (unit) for urgent transfer      Report consisted of patients Situation, Background, Assessment and   Recommendations(SBAR). Information from the following report(s) SBAR, Intake/Output, MAR and Recent Results was reviewed with the receiving nurse. Opportunity for questions and clarification was provided. Assessment completed upon patients arrival to unit and care assumed. 2000: Patient arrived as RRT from stepdown floor for BIPAP vs intubation. Patient arrived on 18/6/40% BIPAP with stable hemodynamics. Patient very hard of hearing but able to state name, location, and year. [address] Updated patient's significant other Elias Colon. Shaye Cleary NP at bedside. Noted non-blanchable area on sacrum. Foam dressing placed. 9940-8990: Repeat ABG showed patient still with co2 80s. Per Shaye Cleary will continue with patient on BIPAP as he is now a DNR/DNI. BP dropped into 80s/50s; per NP giving 5% albumin. Patient voided after bladder scan of 364cc. Per NP, OK to tolerate HR <120 in order to maximize BP. Dilt gtt titrated off for controlled HR and low BP.     2214-1384: Shaye Cleary, NP at bedside to assess patient for sustained low BP and -130s. NP stated to give Amio bolus and start gtt. NP at bedside for ultrasound cardiac eval. Patient BP not responsive to Trendelenburg positioning. NP stated to [facility] for accurate I&Os. Noted reddened area on nasal bridge under BIPAP mask. Mepelex Transfer placed under device. 9721-4740: Hypotension continued despite 2nd bottle of albumin. Devin gtt started for MAP 40s.     0716-1597: Patient much more interactive, asking appropriate questions about his care. Requesting for family to come visit him. Tolerates periods off the BIPAP.

## 2021-05-19 NOTE — PROGRESS NOTES
PT note:     Chart reviewed and noted events from yesterday, now transferred into CCU. Patient off BIPAP although palliative consulted to make plans of care. Discussed with MD and requesting to hold PT session today until plans made and patient fairly confused. Will follow up tomorrow.      Yasir Mehta, PT, DPT

## 2021-05-19 NOTE — PROGRESS NOTES
5/19/2021 0915 AM     Admit Date: 5/14/2021    Admit Diagnosis:   Acute hypoxemic respiratory failure (HCC) [J96.01];CHF exacerbation (HCC) [I50.9];CAP (community acquired pneumonia) [J18.9]    Subjective:     Karen Anne is back to chatty self this am. Askign pertinent questions. States \"I guess I am not doing too good if I am in the ICU\". States \"I don't feel well\"       Noted he is now off bipap, off gtts. Noted change to PO amiodarone. BP has normalized. Labs steady, sodium 130       Current Facility-Administered Medications   Medication Dose Route Frequency    [START ON 5/20/2021] alcohol 62% (NOZIN) nasal  1 Ampule  1 Ampule Topical Q12H    amiodarone (CORDARONE) tablet 400 mg  400 mg Oral BID    PHENYLephrine (RENÉE-SYNEPHRINE) 30 mg in 0.9% sodium chloride 250 mL infusion  0-100 mcg/min IntraVENous TITRATE    metoprolol tartrate (LOPRESSOR) tablet 25 mg  25 mg Oral BID    levalbuterol (XOPENEX) nebulizer soln 0.63 mg/3 mL  0.63 mg Nebulization Q6H PRN    apixaban (ELIQUIS) tablet 2.5 mg  2.5 mg Oral BID    sodium chloride (NS) flush 5-40 mL  5-40 mL IntraVENous Q8H    sodium chloride (NS) flush 5-40 mL  5-40 mL IntraVENous PRN    polyethylene glycol (MIRALAX) packet 17 g  17 g Oral DAILY PRN    ondansetron (ZOFRAN) injection 4 mg  4 mg IntraVENous Q6H PRN         Objective:      Physical Exam:    Visit Vitals  /70 (BP 1 Location: Right arm, BP Patient Position: At rest)   Pulse (!) 117   Temp 97.8 °F (36.6 °C)   Resp 22   Ht 5' 9\" (1.753 m)   Wt 62.7 kg (138 lb 3.7 oz)   SpO2 95%   BMI 20.41 kg/m²     Gen:  NAD  Mental Status - Alert. General Appearance - Not in acute distress. Chest and Lung Exam   Inspection: Accessory muscles - No use of accessory muscles in breathing. Auscultation:   Breath sounds: -clear, diminished, no crackles   Cardiovascular   Inspection: Jugular vein - Bilateral - Inspection Normal.   Palpation/Percussion:   Auscultation: Rhythm - Irr.  Heart Sounds - S1 WNL and S2 WNL. No S3 or S4. Murmurs & Other Heart Sounds: Auscultation of the heart reveals - No Murmurs. Peripheral Vascular   Upper Extremity: Inspection - Bilateral - No Cyanotic nailbeds or Digital clubbing. Lower Extremity:   Palpation: Edema - Bilateral - No edema. Abdomen:   Soft, non-tender, bowel sounds are active. Neuro: Alert, confused, repeatedly asking the same questions, CN and motor grossly WNL    Data Review:   Recent Labs     05/19/21 0454 05/18/21 1944 05/18/21  0354   WBC 6.4 6.5 6.6   HGB 9.5* 11.5* 10.7*   HCT 30.2* 36.8 34.2*   * 145* 145*     Recent Labs     05/19/21 0454 05/18/21 1944 05/18/21  0354   * 127* 124*   K 3.9 4.1 4.2   CL 86* 82* 81*   CO2 43* 44* 43*   GLU 90 121* 123*   BUN 7 7 10   CREA 0.32* 0.44* 0.33*   CA 8.6 8.7 8.6   MG 2.1  --   --    PHOS 2.3*  --   --    ALB  --  3.0*  --    TBILI  --  0.5  --    ALT  --  48  --    INR  --  1.1  --        No results for input(s): TROIQ, CPK, CKMB in the last 72 hours. Intake/Output Summary (Last 24 hours) at 5/19/2021 1012  Last data filed at 5/19/2021 0800  Gross per 24 hour   Intake 1036 ml   Output 660 ml   Net 376 ml        Telemetry: AFIB, rate low 100's    Assessment:     Principal Problem:    CAP (community acquired pneumonia) (5/14/2021)    Active Problems:    Hypertension (5/31/2014)      Dyslipidemia ()      CHF exacerbation ([facility] (5/14/2021)      Acute hypoxemic respiratory failure ([facility] (5/14/2021)      PAF (paroxysmal atrial fibrillation) ([facility] (5/15/2021)        Plan:     Charlee Gil is a 80 y.o. male admitted for Acute hypoxemic respiratory failure ([facility] [J96.01];CHF exacerbation ([facility] [I50.9];CAP (community acquired pneumonia) [J18.9]. We are consulted for paroxysmal atrial fibrillation with slightly rapid ventricular response. He is known to Dr. Arsen Milner, and is on p.o. diltiazem and Eliquis at baseline. Last EKG in the office showed sinus rhythm.   Today he remains in atrial fibrillation, restarted on dilt gtt overnight. Paroxysmal atrial fibrillation:  Rate currently controlled, BP has improved. Noted switch to PO amiodarone 400 mg BID, metoprolol 25 mg BID, and Eliquis 2.5 mg PO BID. Considering advanced age, dementia and comorbidities I favor a rate control and anticoagulation strategy rather than rhythm control. Closely monitor BP. Possible diastolic CHF:  LVEF normal, cannot rule out component of diastolic heart failure. Diuretic currently held due to fairly good oxygenation and significant hyponatremia-improved sodium 130 today. Hold lasix again for today, strict I/O, daily weights, labs; proBNP is elevated at 7000+. Echo shows slight reduction in EF to 50-55%, dilated LA, mod/severe MR, trace TR, trace [place]  I/O -1.2 L, weights are inconsistent and not reliable data. No edema noted. Remains on 4L/NC    Suspected community-acquired pneumonia:  As per internal medicine. Procalcitonin is normalthis may argue for acute heart failure with preserved ejection fraction greater than pneumonia. Hyponatremia:improving   As per internal medicine. Improved. Holding lasix again for today, appears euvolemic. Dyslipidemia:  Statin dc'd by attending     Hypertension: BP has stabilized. Currently well controlled, monitor    Orthostatic hypotension:  + from sitting to standing, BP dropped approximately 28 mmHg. Fall precautions  Assist with transfers and ambulation  PT/OT      During rounds RN approached Cardiology NP and stated \"Dr. Marcello Henson has canceled all consults except palliative\". We will see as needed-please call if you feel we are needed to contribute to the clinical plan of care for this patient. Thank you,    Magalis Holly  DNP,APRN,AG-ACNP-BC    Patient seen and examined by me with the above nurse practitioner.   I personally performed all components of the history, physical, and medical decision making and agree with the assessment and plan with minor modifications as noted. Today the patient has improved mental status. General PE  Gen:  NAD  Mental Status - Alert. General Appearance - Not in acute distress. HEENT:  PERRL, no carotid bruits or JVD  Chest and Lung Exam   Inspection: Accessory muscles - No use of accessory muscles in breathing. Auscultation:   Breath sounds: - Normal.   Cardiovascular   Inspection: Jugular vein - Bilateral - Inspection Normal.   Palpation/Percussion:   Apical Impulse: - Normal.   Auscultation: Rhythm - irregular. Heart Sounds - S1 WNL and S2 WNL. No S3 or S4. Murmurs & Other Heart Sounds: Auscultation of the heart reveals - No Murmurs. Peripheral Vascular   Upper Extremity: Inspection - Bilateral - No Cyanotic nailbeds or Digital clubbing. Lower Extremity:   Palpation: Edema - Bilateral - No edema. Abdomen:   Soft, non-tender, bowel sounds are active. Neuro: A&O times 3, CN and motor grossly WNL    It appears the patient may be transitioning to comfort care. I will check the chart tomorrow to confirm. If that is the case, we will see as needed. We appreciate the opportunity to participate in this patient's care.

## 2021-05-19 NOTE — PROGRESS NOTES
Occupational Therapy    Chart reviewed and noted events from yesterday, now transferred into CCU. Patient off BIPAP, but palliative consulted to make plan of care. Per PT's discussion with MD, MD requesting that therapies be put on hold today until plans made. Patient is also fairly confused today. Will defer as requested and follow back tomorrow.      Eitan Ward, OTR/L

## 2021-05-19 NOTE — PROGRESS NOTES
Transition of Care Plan:     RUR:21%  Disposition:Home with friend  Follow up appointments:PCP and specialist (Neurology)  DME needed:TBD  Transportation at [address] or means to access home:   Friend     IM Medicare letter:2nd IMM Letter  Caregiver Contact:Wanda RicoPmdgjhw-aouzcz-[phone]  Discharge Caregiver contacted prior to discharge?               Rapid Response called for poorly responsive state - Pt transferred to CCU for respiratory distress. In MD conversations w/ pt's significant other and nephew, it was determined that pt has a living will that stated that pt would not want extraordinary measures in event of critical illness. Pt's code status changed to DNR. Today Palliative Care spoke at length with pt and SO Patricia Moura and it was decided that they wish to go home with Hospice care as soon as possible. Transition of Care Plan:    Pt and SO Patricia Moura were given [facility] for Hospice providers and their choice was AT [facility]. Ref sent via [address] [place] [address] Drive. It is their hope to discharge home tomorrow afternoon if dme can be set up in time. Pt's physical address is:  [address] [place] [address], [address] [place] [address], [address] [place] [address]  Ref sent to [facility] to arrange 1400 BLS stretcher transport. Pt does not wish to be alone tonight and asked if he could hire a caregiver to stay with him tonight. CM spoke w/ CCU Mgr Nadya Headley who spoke to [employer] who gave approval for pt's SO Vernelle Citizen to stay overnight in his room. DC home on 5/20 if hospice arrgmts in place.     WINSTON Jimenez

## 2021-05-19 NOTE — PROGRESS NOTES
I prayed with Chico Fisher and his wife and celebrated with Chico Fisher the [address].   Father Tulio Randall

## 2021-05-19 NOTE — PROGRESS NOTES
He is off BiPAP and cognition seems back to his baseline. He is comfortable on NC O2. CXR this AM reveals no overt pulmonary edema, vague RLL opacity and LLL atelectasis. He remains in AF but rate is well-controlled. His BP is \"soft\" but adequate. The events of last night are noted. He is now DNR which, in my judgment is very appropriate. I have indicated to both Nephrology and Cardiology that we are moving in a palliative care direction. I asked Palliative Care to see him to discuss reasonable options to achieve the goal of maximizing his comfort and dignity including the possibility of discharge to home with Hospice. Given the current goals of care, there is no reason for him to remain in the ICU. Transfer to telemetry floor and back to Hospitalist Service.  I have not ordered any labs for tomorrow in light of the change in goals of care    Haroon Augustine, [address] [facility]  [phone]  5/19/2021 1:11 PM

## 2021-05-19 NOTE — CONSULTS
Palliative Medicine Consult  Jae: [phone] ([phone])    Patient Name: Betsy Russ  YOB: 1926    Date of Initial Consult: 5/19/21  Reason for Consult: end of life care  Requesting Provider: Yolanda Clark MD  Primary Care Physician: Matthieu Marrero MD     SUMMARY:   Betsy Russ is a 80 y. o. with a past history of afib, HTN, dilated aortic root, diverticulosis, GERD, colonic polyps, prostate cancer who was transferred on 5/14/2021 from [facility] with a diagnosis of acute hypercarbic respiratory failure. Current medical issues leading to Palliative Medicine involvement include: care decisions. 5/18:   RRT called due to unresponsiveness, code stroke called. Placed on bipap, transferred to PCU. EEG abnormal, moderate generalized slowing as seen in encephalopathies/diffuse cerebral dysfunction. There is no focal asymmetry, seizures or epileptiform discharges seen. Upon arrival to PCU RRT called again due to pt obtunded, sats 72%, transferred to CCU. HEAD CTA: 1. No evidence of large vessel occlusion or hemodynamically significant stenosis. 2.  Technically limited perfusion study due to motion without diagnostic   Information. HEAD CT: No acute intracranial abnormalities. 5/19: last pm NP discussed GOC with Andreina Reinoso, who report pt has a living will that states he does not want any extraordinary measures in the event of critical illness, and that he is DNR/DNI. This am, pt is awake, alert, oriented to self, place, situation, wants to go home. Psychosocial: lives with [address] [place] [address], [phone] in one level home, independent with ADLs/IADLs, no DMEs. Friend drives him to appts. Pt's nephew/ NOK  Jodee Chowdary  [phone]. Per pt and Anatoly Wheeler, AMD indicates Anatoly Wheeler is mPOA as well as financial POA. PALLIATIVE DIAGNOSES:   1. SOB  2. Orthopnea  3. Sore throat   4. Acute encephalopathy   5. Acute hypercarbic respiratory failure   6. afib with RVR  7.  Poor appetite  8. Protein-calorie malnutrition: 7% weight loss in past 3 months  9. Physical debility: severe weakness, requiring mod assist x 2 for safe stand-pivot  10. Goals of care   PLAN:   1. Prior to visit, I completed an extensive review of patient's medical records, including medical documentation, vital signs, MARs, and results of various labs and other diagnostics. I also spoke with patient's nurse Orvan Staff and intensivist Dr. Carter Montenegro, case management Steilacoommiles Kerns . 2. Met with pt and Ortiz Luevano: pt is adamant he wants to go home, is afraid to be in the hospital without his [address] at bedside. Both pt and Ortiz Luevano are in agreement for pt to go home with Hospice support, they can afford to pay a home agency (they state [facility] local) and have many friends and family that can help. We discussed his medical condition; pulmonary edema with hypercarbia most likely 2/2 afib, this has been corrected, and with NC02 supplement he is doing much better. This can continue at home with hospice support. 1.  is assisting with Hospice. 2. Pt to transfer to oncology to continue care until transfer home with hospice. 3. SO Wanda can stay tonight with pt for comfort. 3. Initial consult note routed to primary continuity provider and/or primary health care team members  4.  Communicated plan of care with: Palliative Trey FLOOD [address] Team     GOALS OF CARE / TREATMENT PREFERENCES:     GOALS OF CARE:  Patient/Health Care Proxy Stated Goals: Comfort    TREATMENT PREFERENCES:   Code Status: DNR    Advance Care Planning:  [x] [facility] Interdisciplinary Team has updated the ACP Navigator with Health Care Decision Maker and Patient Capacity      Primary Decision Maker: Jeane Barber - [phone]    Secondary Decision Maker: Babatunde Hopper [address] - [phone]  Advance Care Planning 5/19/2021   Patient's Healthcare Decision Maker is: Named in scanned ACP document   Confirm Advance Directive Yes, not on file       Medical Interventions: Comfort measures        Artificially Administered Nutrition: No feeding tube     Other:    As far as possible, the palliative care team has discussed with patient / health care proxy about goals of care / treatment preferences for patient. HISTORY:     History obtained from: chart    CHIEF COMPLAINT: SOB    HPI/SUBJECTIVE:    The patient is:   [x] Verbal and participatory   [] Non-participatory due to:     5/14: BIBA to [facility] ED  with c/o SOB x 1 week that has gradually worsened over time with associated sore throat and orthopnea. Initially he c/o COURTNEY that improved with rest, however, over the past 2 days SOB was not relieved with rest.  Transferred to [facility] due to intermittent responsiveness and chronic respiratory acidosis, for routine progression of care. Chest CTA: bibasilar infiltrates, no pulmonary emboli.    Head CT:    Clinical Pain Assessment (nonverbal scale for severity on nonverbal patients):   Clinical Pain Assessment  Severity: 4  Location: generalized  Character: achy  Duration: days  Effect: movement  Factors: lying in bed too long  Frequency: constant     Activity (Movement): Lying quietly, normal position    Duration: for how long has pt been experiencing pain (e.g., 2 days, 1 month, years)  Frequency: how often pain is an issue (e.g., several times per day, once every few days, constant)     FUNCTIONAL ASSESSMENT:     Palliative Performance Scale (PPS):  PPS: 30       PSYCHOSOCIAL/SPIRITUAL SCREENING:     Palliative IDT has assessed this patient for cultural preferences / practices and a referral made as appropriate to needs (Cultural Services, Patient Advocacy, Ethics, etc.)    Any spiritual / Anabaptist concerns:  [] Yes /  [x] No    Caregiver Burnout:  [] Yes /  [x] No /  [] No Caregiver Present      Anticipatory grief assessment:   [x] Normal  / [] Maladaptive       ESAS Anxiety: Anxiety: 0    ESAS Depression: Depression: 3 REVIEW OF SYSTEMS:     Positive and pertinent negative findings in ROS are noted above in HPI. The following systems were [x] reviewed / [] unable to be reviewed as noted in HPI  Other findings are noted below. Systems: constitutional, ears/nose/mouth/throat, respiratory, gastrointestinal, genitourinary, musculoskeletal, integumentary, neurologic, psychiatric, endocrine. Positive findings noted below. Modified ESAS Completed by: provider   Fatigue: 8 Drowsiness: 0   Depression: 3 Pain: 4   Anxiety: 0 Nausea: 0   Anorexia: 10 Dyspnea: 0     Constipation: No     Stool Occurrence(s): 1        PHYSICAL EXAM:     From RN flowsheet:  Wt Readings from Last 3 Encounters:   05/19/21 138 lb 3.7 oz (62.7 kg)   04/20/21 135 lb 3.2 oz (61.3 kg)   03/04/21 143 lb 9.6 oz (65.1 kg)     Blood pressure (!) 94/54, pulse (!) 107, temperature 98 °F (36.7 °C), resp. rate 18, height 5' 9\" (1.753 m), weight 138 lb 3.7 oz (62.7 kg), SpO2 96 %.     Pain Scale 1: Numeric (0 - 10)  Pain Intensity 1: 3     Pain Location 1: Generalized           Last bowel movement, if known:     Constitutional: elderly, frail, pleasant, NAD, AAO x3  Eyes: pupils equal, anicteric  ENMT: no nasal discharge, dry mucous membranes  Cardiovascular: tachy, regular rhythm, distal pulses intact  Respiratory: breathing not labored, symmetric, NC02  Gastrointestinal: soft non-tender, +bowel sounds  Musculoskeletal: no deformity, no tenderness to palpation  Skin: warm, dry  Neurologic: following commands, moving all extremities  Psychiatric: full affect, no hallucinations         HISTORY:     Principal Problem:    CAP (community acquired pneumonia) (5/14/2021)    Active Problems:    Hypertension (5/31/2014)      Dyslipidemia ()      CHF exacerbation ([facility] (5/14/2021)      Acute hypoxemic respiratory failure (HCC) (5/14/2021)      PAF (paroxysmal atrial fibrillation) ([facility] (5/15/2021)      Past Medical History:   Diagnosis Date    Afib ([facility]     Dilated aortic root ([facility]     Diverticulosis     Dyslipidemia     GERD (gastroesophageal reflux disease)     HTN (hypertension)     Hx of colonic polyps     Hyponatremia 2/23/2021    Mild valvular heart disease     Prostate cancer ([facility] 2014    s/p XRT (proton beam)      Past Surgical History:   Procedure Laterality Date    HX ORTHOPAEDIC      HX UROLOGICAL        Family History   Problem Relation Age of Onset    Stroke Father     Hypertension Father     Cancer Sister         colon    Hypertension Mother       History reviewed, no pertinent family history.   Social History     Tobacco Use    Smoking status: Never Smoker    Smokeless tobacco: Never Used   Substance Use Topics    Alcohol use: No     Comment: quit 20 years ago- states he was a heavy drinker     Allergies   Allergen Reactions    Oxycodone-Acetaminophen Other (comments)     Cloudy conscious      Prednisone Other (comments)      Current Facility-Administered Medications   Medication Dose Route Frequency    [START ON 5/20/2021] alcohol 62% (NOZIN) nasal  1 Ampule  1 Ampule Topical Q12H    amiodarone (CORDARONE) tablet 400 mg  400 mg Oral BID    metoprolol tartrate (LOPRESSOR) tablet 25 mg  25 mg Oral BID    acetaminophen (TYLENOL) tablet 650 mg  650 mg Oral Q6H PRN    levalbuterol (XOPENEX) nebulizer soln 0.63 mg/3 mL  0.63 mg Nebulization Q6H PRN    apixaban (ELIQUIS) tablet 2.5 mg  2.5 mg Oral BID    sodium chloride (NS) flush 5-40 mL  5-40 mL IntraVENous Q8H    sodium chloride (NS) flush 5-40 mL  5-40 mL IntraVENous PRN    polyethylene glycol (MIRALAX) packet 17 g  17 g Oral DAILY PRN    ondansetron (ZOFRAN) injection 4 mg  4 mg IntraVENous Q6H PRN          LAB AND IMAGING FINDINGS:     Lab Results   Component Value Date/Time    WBC 6.4 05/19/2021 04:54 AM    HGB 9.5 (L) 05/19/2021 04:54 AM    PLATELET 665 (L) 67/18/7456 04:54 AM     Lab Results   Component Value Date/Time    Sodium 130 (L) 05/19/2021 04:54 AM    Potassium 3.9 05/19/2021 04:54 AM    Chloride 86 (L) 05/19/2021 04:54 AM    CO2 43 (HH) 05/19/2021 04:54 AM    BUN 7 05/19/2021 04:54 AM    Creatinine 0.32 (L) 05/19/2021 04:54 AM    Calcium 8.6 05/19/2021 04:54 AM    Magnesium 2.1 05/19/2021 04:54 AM    Phosphorus 2.3 (L) 05/19/2021 04:54 AM      Lab Results   Component Value Date/Time    Alk. phosphatase 78 05/18/2021 07:44 PM    Protein, total 6.0 (L) 05/18/2021 07:44 PM    Albumin 3.0 (L) 05/18/2021 07:44 PM    Globulin 3.0 05/18/2021 07:44 PM     Lab Results   Component Value Date/Time    INR 1.1 05/18/2021 07:44 PM    Prothrombin time 11.5 (H) 05/18/2021 07:44 PM      No results found for: IRON, FE, TIBC, IBCT, PSAT, FERR   Lab Results   Component Value Date/Time    pH 7.34 (L) 05/18/2021 08:34 PM    PCO2 82 (H) 05/18/2021 08:34 PM    PO2 235 (H) 05/18/2021 08:34 PM     No components found for: [place]   Lab Results   Component Value Date/Time     08/03/2018 11:15 PM    CK - MB 2.5 08/03/2018 11:15 PM                Total time: 90  Counseling / coordination time, spent as noted above: 75  > 50% counseling / coordination?: y    Prolonged service was provided for  []30 min   []75 min in face to face time in the presence of the patient, spent as noted above. Time Start:   Time End:   Note: this can only be billed with 90694 (initial) or 63544 (follow up). If multiple start / stop times, list each separately.

## 2021-05-19 NOTE — PROGRESS NOTES
Hospitalist Progress Note    NAME: Clay Posey   :  10/19/1926   MRN:  [id number]       Assessment / Plan:    Acute hypoxic hypercapnic respiratory failure most likely secondary to diastolic CHF exacerbation acute on chronic  Community-acquired pneumonia  BiPAP PRN  Cardiology consultation  Continue IV antibiotic Rocephin and azithromycin     Change mental status most likely secondary to metabolic encephalopathy  head CT and CTA head and neck was negative for any acute abnormality  Neurology consultation     A. fib with RVR  Cardiology consultation  Start patient on Cardizem drip  Continue Eliquis     Hyponatremiamonitoring  Nephrology consultation     Hypertension  GERD  Hyperlipidemia     Continue home medication           18.5 - 24.9 Normal weight / Body mass index is 20.41 kg/m².     Estimated discharge date: May 22  Barriers:     Code status: Full  Prophylaxis: Eliquis   Recommended Disposition: SNF/LTC       Subjective:     Chief Complaint / Reason for Physician Visit  . Discussed with RN events overnight feel  better more alert . Review of Systems:  Symptom Y/N Comments  Symptom Y/N Comments   Fever/Chills n   Chest Pain n    Poor Appetite    Edema     Cough    Abdominal Pain     Sputum    Joint Pain     SOB/COURTNEY y   Pruritis/Rash     Nausea/vomit    Tolerating PT/OT     Diarrhea    Tolerating Diet y    Constipation n   Other       Could NOT obtain due to:      Objective:     VITALS:   Last 24hrs VS reviewed since prior progress note.  Most recent are:  Patient Vitals for the past 24 hrs:   Temp Pulse Resp BP SpO2   21 1400  (!) 107 18 (!) 94/54    21 1200 98 °F (36.7 °C) 99 20 (!) 84/53 96 %   21 1100  (!) 105 23 (!) 87/42    21 1000  (!) 109 23     21 0900  (!) 117 22 (!) 104/58    21 0812     95 %   21 0800 97.8 °F (36.6 °C) (!) 115 25 110/70 96 %   21 0700  (!) 108 (!) 31 90/64 98 %   21 0630  (!) 108 20 101/70  05/19/21 0615  (!) 110 26 101/68    05/19/21 0600  (!) 111 23 98/61    05/19/21 0545  (!) 106 16 100/60    05/19/21 0530  (!) 110 17 (!) 105/59    05/19/21 0515  (!) 109 21 (!) 107/58    05/19/21 0500  100 25 105/69    05/19/21 0445  100 22 108/63    05/19/21 0430  (!) 105 22 100/66    05/19/21 0415  (!) 110 23 (!) 91/51    05/19/21 0400 97.8 °F (36.6 °C) (!) 105 27 (!) 96/58 99 %   05/19/21 0345  (!) 110 24 (!) 73/35    05/19/21 0330  (!) 108 27 (!) 94/47    05/19/21 0315  (!) 108 18 96/64    05/19/21 0300  98 19 94/60    05/19/21 0245  (!) 101 17 (!) 93/52    05/19/21 0230  (!) 110 17 (!) 85/47    05/19/21 0215  (!) 104 15 (!) 79/43    05/19/21 0200  (!) 107 20 (!) 89/48    05/19/21 0145  (!) 106 23 (!) 88/53    05/19/21 0130  (!) 104 18 (!) 78/49    05/19/21 0115  (!) 105 18 (!) 78/50    05/19/21 0100  (!) 111 21 (!) 80/49    05/19/21 0045  (!) 109 20 (!) 71/43    05/19/21 0030    (!) 76/40    05/19/21 0015  (!) 103 25 (!) 95/56    05/19/21 0000 97.8 °F (36.6 °C) (!) 105 21 (!) 93/52 98 %   05/18/21 2333     98 %   05/18/21 2300  (!) 102 20 (!) 85/50 98 %   05/18/21 2200  (!) 108 23 (!) 89/43 96 %   05/18/21 2100  (!) 103 19 (!) 80/48 97 %   05/18/21 2000  (!) 108 25 104/64 100 %   05/18/21 1948     97 %   05/18/21 1945 96.8 °F (36 °C) (!) 109 28 (!) 153/68 95 %   05/18/21 1856 (!) 96.1 °F (35.6 °C) (!) 110 (!) 40 120/64 99 %   05/18/21 1850     99 %   05/18/21 1830 (!) 96.1 °F (35.6 °C) (!) 120 (!) 33 120/64 (!) 72 %       Intake/Output Summary (Last 24 hours) at 5/19/2021 1521  Last data filed at 5/19/2021 1100  Gross per 24 hour   Intake 1036 ml   Output 935 ml   Net 101 ml        I had a face to face encounter and independently examined this patient on 5/19/2021, as outlined below:  PHYSICAL EXAM:  General: WD, WN. Alert, cooperative, no acute distress    EENT:  EOMI. Anicteric sclerae. MMM  Resp:  CTA bilaterally, no wheezing or rales.   No accessory muscle use  CV:  Regular  rhythm,  No edema  GI:  Soft, Non distended, Non tender. +Bowel sounds  Neurologic:  Alert   Psych:   Good insight. Not anxious nor agitated  Skin:  No rashes. No jaundice    Reviewed most current lab test results and cultures  YES  Reviewed most current radiology test results   YES  Review and summation of old records today    NO  Reviewed patient's current orders and MAR    YES  PMH/SH reviewed - no change compared to H&P  ________________________________________________________________________  Care Plan discussed with:    Comments   Patient y    Family  y    RN     Care Manager     Consultant                        Multidiciplinary team rounds were held today with , nursing, pharmacist and clinical coordinator. Patient's plan of care was discussed; medications were reviewed and discharge planning was addressed. ________________________________________________________________________  Total NON critical care TIME:  35   Minutes    Total CRITICAL CARE TIME Spent:   Minutes non procedure based      Comments   >50% of visit spent in counseling and coordination of care y    ________________________________________________________________________  Pete Sandy MD     Procedures: see electronic medical records for all procedures/Xrays and details which were not copied into this note but were reviewed prior to creation of Plan. LABS:  I reviewed today's most current labs and imaging studies.   Pertinent labs include:  Recent Labs     05/19/21  0454 05/18/21  1944 05/18/21  0354   WBC 6.4 6.5 6.6   HGB 9.5* 11.5* 10.7*   HCT 30.2* 36.8 34.2*   * 145* 145*     Recent Labs     05/19/21  0454 05/18/21  1944 05/18/21  0354   * 127* 124*   K 3.9 4.1 4.2   CL 86* 82* 81*   CO2 43* 44* 43*   GLU 90 121* 123*   BUN 7 7 10   CREA 0.32* 0.44* 0.33*   CA 8.6 8.7 8.6   MG 2.1  --   --    PHOS 2.3*  --   --    ALB  --  3.0*  --    TBILI  --  0.5  --    ALT  -- 48  --    INR  --  1.1  --        Signed:  Jeane Cook MD

## 2021-05-19 NOTE — ACP (ADVANCE CARE PLANNING)
I personally had phone conversations with both Tabitha Davis (pt's significant other) [phone] and Jerry Coffey (pt's nephew and [facility]) [phone]. As per Zaynab Licona pt has appointed Jerry Coffey as his medical decision maker. He also has a living will that states he would not want \"any extraordinary measures\" in the event of critical illness. However, she does not have access to this document at this time. I spoke with Jerry Coffey next (phone conversation witnessed by KIARA Marley) to clarify DNR/DNI status given his living will. As per Alvaro Ang, the patient would not want to be intubated nor would he want resuscitation in the event of cardiac arrest (no shocks, chest compressions, or ACLS). DNR paperwork completed and placed in chart.     BELLA Hunt [facility]  9:17 PM  5/18/21

## 2021-05-20 VITALS
HEART RATE: 77 BPM | WEIGHT: 138.23 LBS | OXYGEN SATURATION: 98 % | TEMPERATURE: 97.7 F | RESPIRATION RATE: 18 BRPM | SYSTOLIC BLOOD PRESSURE: 127 MMHG | BODY MASS INDEX: 20.47 KG/M2 | HEIGHT: 69 IN | DIASTOLIC BLOOD PRESSURE: 67 MMHG

## 2021-05-20 LAB
ANION GAP SERPL CALC-SCNC: ABNORMAL MMOL/L (ref 5–15)
BACTERIA SPEC CULT: NORMAL
BACTERIA SPEC CULT: NORMAL
BASOPHILS # BLD: 0 K/UL (ref 0–0.1)
BASOPHILS NFR BLD: 0 % (ref 0–1)
BUN SERPL-MCNC: 8 MG/DL (ref 6–20)
BUN/CREAT SERPL: 31 (ref 12–20)
CALCIUM SERPL-MCNC: 8.2 MG/DL (ref 8.5–10.1)
CHLORIDE SERPL-SCNC: 84 MMOL/L (ref 97–108)
CO2 SERPL-SCNC: 44 MMOL/L (ref 21–32)
CREAT SERPL-MCNC: 0.26 MG/DL (ref 0.7–1.3)
DIFFERENTIAL METHOD BLD: ABNORMAL
EOSINOPHIL # BLD: 0 K/UL (ref 0–0.4)
EOSINOPHIL NFR BLD: 0 % (ref 0–7)
ERYTHROCYTE [DISTWIDTH] IN BLOOD BY AUTOMATED COUNT: 12.8 % (ref 11.5–14.5)
GLUCOSE SERPL-MCNC: 92 MG/DL (ref 65–100)
HCT VFR BLD AUTO: 29.9 % (ref 36.6–50.3)
HGB BLD-MCNC: 9.6 G/DL (ref 12.1–17)
IMM GRANULOCYTES # BLD AUTO: 0 K/UL (ref 0–0.04)
IMM GRANULOCYTES NFR BLD AUTO: 0 % (ref 0–0.5)
LYMPHOCYTES # BLD: 0.6 K/UL (ref 0.8–3.5)
LYMPHOCYTES NFR BLD: 9 % (ref 12–49)
MCH RBC QN AUTO: 31.1 PG (ref 26–34)
MCHC RBC AUTO-ENTMCNC: 32.1 G/DL (ref 30–36.5)
MCV RBC AUTO: 96.8 FL (ref 80–99)
MONOCYTES # BLD: 0.6 K/UL (ref 0–1)
MONOCYTES NFR BLD: 9 % (ref 5–13)
NEUTS SEG # BLD: 5.7 K/UL (ref 1.8–8)
NEUTS SEG NFR BLD: 82 % (ref 32–75)
NRBC # BLD: 0 K/UL (ref 0–0.01)
NRBC BLD-RTO: 0 PER 100 WBC
PLATELET # BLD AUTO: 131 K/UL (ref 150–400)
PMV BLD AUTO: 9.4 FL (ref 8.9–12.9)
POTASSIUM SERPL-SCNC: 4.3 MMOL/L (ref 3.5–5.1)
RBC # BLD AUTO: 3.09 M/UL (ref 4.1–5.7)
SERVICE CMNT-IMP: NORMAL
SERVICE CMNT-IMP: NORMAL
SODIUM SERPL-SCNC: 127 MMOL/L (ref 136–145)
WBC # BLD AUTO: 6.9 K/UL (ref 4.1–11.1)

## 2021-05-20 PROCEDURE — 94760 N-INVAS EAR/PLS OXIMETRY 1: CPT

## 2021-05-20 PROCEDURE — 97535 SELF CARE MNGMENT TRAINING: CPT | Performed by: OCCUPATIONAL THERAPIST

## 2021-05-20 PROCEDURE — 36415 COLL VENOUS BLD VENIPUNCTURE: CPT

## 2021-05-20 PROCEDURE — 97530 THERAPEUTIC ACTIVITIES: CPT

## 2021-05-20 PROCEDURE — 97168 OT RE-EVAL EST PLAN CARE: CPT | Performed by: OCCUPATIONAL THERAPIST

## 2021-05-20 PROCEDURE — 77010033678 HC OXYGEN DAILY

## 2021-05-20 PROCEDURE — 99232 SBSQ HOSP IP/OBS MODERATE 35: CPT | Performed by: NURSE PRACTITIONER

## 2021-05-20 PROCEDURE — 85025 COMPLETE CBC W/AUTO DIFF WBC: CPT

## 2021-05-20 PROCEDURE — 97164 PT RE-EVAL EST PLAN CARE: CPT

## 2021-05-20 PROCEDURE — 74011250637 HC RX REV CODE- 250/637: Performed by: INTERNAL MEDICINE

## 2021-05-20 PROCEDURE — 80048 BASIC METABOLIC PNL TOTAL CA: CPT

## 2021-05-20 PROCEDURE — 74011250637 HC RX REV CODE- 250/637: Performed by: STUDENT IN AN ORGANIZED HEALTH CARE EDUCATION/TRAINING PROGRAM

## 2021-05-20 RX ORDER — AMIODARONE HYDROCHLORIDE 400 MG/1
400 TABLET ORAL 2 TIMES DAILY
Qty: 30 TABLET | Refills: 0 | Status: SHIPPED | OUTPATIENT
Start: 2021-05-20

## 2021-05-20 RX ADMIN — METOPROLOL TARTRATE 25 MG: 25 TABLET, FILM COATED ORAL at 08:28

## 2021-05-20 RX ADMIN — AMIODARONE HYDROCHLORIDE 400 MG: 200 TABLET ORAL at 08:28

## 2021-05-20 RX ADMIN — Medication 10 ML: at 14:05

## 2021-05-20 RX ADMIN — Medication 1 AMPULE: at 08:31

## 2021-05-20 RX ADMIN — APIXABAN 2.5 MG: 2.5 TABLET, FILM COATED ORAL at 08:28

## 2021-05-20 NOTE — PROGRESS NOTES
Transition of Care Plan:     RUR:25%  Disposition:Home with At [address]  Follow up appointments: deferred due to home w/ hospice   DME needed: N/A, to be ordered by home hospice   Transportation at Discharge: [facility] medical transport, ETA 2PM  Keys or means to access home:   Friend     IM Medicare letter:2nd IMM Letter reviewed on 5/20/21  Caregiver Contact:Wanda RicoRdfaxmd-dajjqq-[phone]  Discharge Caregiver contacted prior to discharge? yes    CM aware of discharge order. Met with pt and caregiver at bedside to finalize and review d/c plan. [address] letter signed. Medicare pt has received, reviewed, and signed 2nd IM letter informing them of their right to appeal the discharge. Signed copy has been placed on pt bedside chart. Pt provided with copy of letter to keep. CM spoke with [facility] [address] who has accepted referral and ordered DME for stat delivery today. Previous CM has arranged for BLS transport via [facility], ETA given is 2PM. This CM has completed PCS form to include copy of H&P, facesheet, and DDNR which were placed into chart. Scheduling of appointment deferred at this time due to home with hospice. No further CM needs identified at this time. Pt is ready for d/c from a CM standpoint. Assigned RN informed. Care Management Interventions  PCP Verified by CM:  Yes  Palliative Care Criteria Met (RRAT>21 & CHF Dx)?: Yes  Palliative Consult Recommended?: No  Reason Palliative Care Not Recommended?: Patient already received consult  Mode of Transport at Discharge: BLS ([facility])  Hospital Transport Time of Discharge: 1400  Transition of Care Consult (CM Consult): Discharge Planning  Discharge Durable Medical Equipment: No  Physical Therapy Consult: Yes  Occupational Therapy Consult: Yes  Speech Therapy Consult: No  Current Support Network: Own Home  Confirm Follow Up Transport: Other (see comment) (medical transport)  The Plan for Transition of Care is Related to the Following Treatment Goals : home hospice  The Patient and/or Patient Representative was Provided with a Choice of Provider and Agrees with the Discharge Plan?: Yes  Freedom of Choice List was Provided with Basic Dialogue that Supports the Patient's Individualized Plan of Care/Goals, Treatment Preferences and Shares the Quality Data Associated with the Providers?: Yes   Resource Information Provided?: No  Discharge Location  Discharge Placement: Home with hospice (At [address])    Marbin Sharp, [address], [facility]  [phone]

## 2021-05-20 NOTE — DISCHARGE SUMMARY
Hospitalist Discharge Summary     Patient ID:  Lalitha Moss  [id number]  69 y.o.  10/19/1926  5/14/2021    PCP on record: Reyes Stevens MD    Admit date: 5/14/2021  Discharge date and time: 5/20/2021    DISCHARGE DIAGNOSIS:      CONSULTATIONS:  IP CONSULT TO CARDIOLOGY  IP CONSULT TO NEUROLOGY  IP CONSULT TO NEPHROLOGY  IP CONSULT TO PALLIATIVE CARE - PROVIDER    Excerpted HPI from H&P of Michael Hanson MD:    Lalitha Moss is a 80 y.o.  male who presents with shortness of breath. Past medical history of hypertension, GERD, prostate cancer, hyperlipidemia, A. fib, diastolic CHF. Patient complains of shortness of breath getting progressively worse over the last couple of days, last night he was not able to sleep. Patient also stated he has A. fib and recently the rate is not controlled with the oral medications. Patient went to [facility] with the complaints and was transferred here because of the acute respiratory failure. Currently the patient is on 3 L nasal cannula.   Patient complains of chest congestion but no cough, no fever and chills, no chest pain, no nausea vomiting, no abdominal pain, no history of sick contacts.  ______________________________________________________________________  DISCHARGE SUMMARY/HOSPITAL COURSE:  for full details see H&P, daily progress notes, labs, consult notes.        Acute hypoxic hypercapnic respiratory failure most likely secondary to diastolic CHF exacerbation acute on chronic  Community-acquired pneumonia  BiPAP PRN  Cardiology consultation  Continue IV antibiotic Rocephin and azithromycin     Change mental status most likely secondary to metabolic encephalopathy  head CT and CTA head and neck was negative for any acute abnormality  Neurology consultation     A. fib with RVR  Cardiology consultation  Start patient on Cardizem drip  Continue Eliquis     Hyponatremiamonitoring  Nephrology consultation     Hypertension  GERD  Hyperlipidemia     Continue home medication           18.5 - 24.9 Normal weight / Body mass index is 20.41 kg/m².     Estimated discharge date: May 22  Barriers:     Code status: Full  Prophylaxis: Eliquis   Recommended Disposition: SNF/LTC           _______________________________________________________________________  Patient seen and examined by me on discharge day. Pertinent Findings:  Gen:    Not in distress  Chest: Clear lungs  CVS:   Regular rhythm. No edema  Abd:  Soft, not distended, not tender  Neuro:  Alert,   _______________________________________________________________________  DISCHARGE MEDICATIONS:   Current Discharge Medication List      START taking these medications    Details   amiodarone (PACERONE) 400 mg tablet Take 1 Tablet by mouth two (2) times a day. Qty: 30 Tablet, Refills: 0  Start date: 5/20/2021         CONTINUE these medications which have NOT CHANGED    Details   cholecalciferol (Vitamin D3) 25 mcg (1,000 unit) cap Take 1,000 Units by mouth daily. rosuvastatin (CRESTOR) 5 mg tablet Take 1 Tab by mouth nightly. metoprolol tartrate (LOPRESSOR) 50 mg tablet Take 1 Tab by mouth two (2) times a day. Qty: 60 Tab, Refills: 0      apixaban (Eliquis) 2.5 mg tablet Take 1 Tab by mouth two (2) times a day. Qty: 60 Tab, Refills: 0      omeprazole (PRILOSEC) 10 mg capsule Take 10 mg by mouth daily. silodosin (RAPAFLO) 4 mg capsule Take 4 mg by mouth daily (with breakfast). polyethylene glycol (Miralax) 17 gram/dose powder Take 17 g by mouth. STOP taking these medications       dilTIAZem ER (CARDIZEM LA) 180 mg tablet Comments:   Reason for Stopping:         antiox #8/om3/dha/epa/lut/zeax (PRESERVISION AREDS 2, OMEGA-3, PO) Comments:   Reason for Stopping:                 Patient Follow Up Instructions: Activity: PT/OT Eval and Treat  Diet: Cardiac Diet  Wound Care: As directed    Follow-up with PCP  in 3 days.   Follow-up tests/labs     Follow-up Information     Follow up With Specialties Details Why Contact Info    Fide Hameed MD Internal Medicine   [address] [facility]  [phone]      Fide Hameed MD Internal Medicine In 3 days  [address] [address]  [phone]          ________________________________________________________________    Risk of deterioration: High    Condition at Discharge:  Stable  __________________________________________________________________    Disposition  Home with hospice services    ____________________________________________________________________    Code Status: DNR/DNI  ___________________________________________________________________      Total time in minutes spent coordinating this discharge (includes going over instructions, follow-up, prescriptions, and preparing report for sign off to her PCP) :  >30 minutes    Signed:   Luis Antonio Wong MD

## 2021-05-20 NOTE — PROGRESS NOTES
Pharmacist Discharge Medication Reconciliation    Significant PMH:   Past Medical History:   Diagnosis Date    Afib ([facility] [address]     Dilated aortic root ([facility] [address]     Diverticulosis     Dyslipidemia     GERD (gastroesophageal reflux disease)     HTN (hypertension)     Hx of colonic polyps     Hyponatremia 2/23/2021    Mild valvular heart disease     Prostate cancer ([facility] [address] 2014    s/p XRT (proton beam)     Chief Complaint for this Admission:   Chief Complaint   Patient presents with    Transfer Of Care     ED visit d/t transfer from [facility], hospitals - dxed with PNA - arrived on 3 L NC with normal POX - A/Ox3 person place and time and situation; Allergies: Oxycodone-acetaminophen and Prednisone    Discharge Medications:   Current Discharge Medication List        START taking these medications    Details   amiodarone (PACERONE) 400 mg tablet Take 1 Tablet by mouth two (2) times a day. Qty: 30 Tablet, Refills: 0  Start date: 5/20/2021           CONTINUE these medications which have NOT CHANGED    Details   cholecalciferol (Vitamin D3) 25 mcg (1,000 unit) cap Take 1,000 Units by mouth daily. rosuvastatin (CRESTOR) 5 mg tablet Take 1 Tab by mouth nightly. metoprolol tartrate (LOPRESSOR) 50 mg tablet Take 1 Tab by mouth two (2) times a day. Qty: 60 Tab, Refills: 0      apixaban (Eliquis) 2.5 mg tablet Take 1 Tab by mouth two (2) times a day. Qty: 60 Tab, Refills: 0      omeprazole (PRILOSEC) 10 mg capsule Take 10 mg by mouth daily. silodosin (RAPAFLO) 4 mg capsule Take 4 mg by mouth daily (with breakfast). polyethylene glycol (Miralax) 17 gram/dose powder Take 17 g by mouth. STOP taking these medications       dilTIAZem ER (CARDIZEM LA) 180 mg tablet Comments:   Reason for Stopping:         antiox #8/om3/dha/epa/lut/zeax (PRESERVISION AREDS 2, OMEGA-3, PO) Comments:   Reason for Stopping:               The patient's chart, MAR and AVS were reviewed by Chris Perla [facility].     Discharging Provider: Soledad Khalil MD    Thank You,     Zia Hightower, [facility] [place]

## 2021-05-20 NOTE — PROGRESS NOTES
Problem: Self Care Deficits Care Plan (Adult)  Goal: *Acute Goals and Plan of Care (Insert Text)  Description:   FUNCTIONAL STATUS PRIOR TO ADMISSION: Per patient report, he was active without device. Per patient, independent in ADLs and still driving. HOME SUPPORT: Patient reports living with wife, declines need for assist.    Occupational Therapy Goals  Re-Evaluation 5/20/2021  Patient will be going home with hospice. Goals discontinued. Initiated 5/17/2021  1. Patient will tolerate brief standing grooming with minimal assistance using most appropriate DME prn within 7 day(s). 2.  Patient will perform lower body dressing with minimal assistance using most appropriate DME prn within 7 day(s). 3.  Patient will perform toilet transfers to [facility] with minimal assistance using most appropriate DME prn within 7 day(s). 4.  Patient will perform all aspects of toileting with minimal assistance using most appropriate DME prn within 7 day(s). 5.  Patient will participate in upper extremity therapeutic exercise/activities with supervision/set-up for 10 minutes within 7 day(s). 6.  Patient will utilize energy conservation and fall prevention techniques during functional activities with minimal verbal cues within 7 day(s). OCCUPATIONAL THERAPY RE-EVALUATION/DISCHARGE  Patient: Stephanie Benavidez (42 y.o. male)  Date: 5/20/2021  Diagnosis: Acute hypoxemic respiratory failure ([facility] [address] [J96.01]  CHF exacerbation ([facility] [address] [I50.9]  CAP (community acquired pneumonia) [J18.9] CAP (community acquired pneumonia)       Precautions: Fall  Chart, occupational therapy assessment, plan of care, and goals were reviewed. ASSESSMENT  New consult received to determine level of assistance and equipment needed to go home this afternoon with hospice. Patient was alert and agreeable. He was able to move supine to sit with CGA. Sitting balance was fair with intermittent posterior loss of balance.  He required Mod to Min A x 2 for sit to stand, and presents with a strong posterior lean. Patient's wife was educated on how to safely assist with ADL. He was incontinent of bowel; required Total A to get him cleaned up. Patient perseverated on his hendrix, wondering when it would be taken out. Patient returned to supine. Encouraged them to complete UE exercises in bed as tolerated. They indicated understanding. Patient will be going home this afternoon with hospice services. No further skilled OT treatment is indicated at this time. PLAN :  Recommendation for discharge: (in order for the patient to meet his/her long term goals)  No skilled occupational therapy/ follow up rehabilitation needs identified at this time. This discharge recommendation:  Has been made in collaboration with the attending provider and/or case management    Equipment recommendations for successful discharge (if) home: bedside commode, hospital bed, portable oxygen, and wheelchair       SUBJECTIVE:   Patient stated Do you think I am going to get better?     OBJECTIVE DATA SUMMARY:   Cognitive/Behavioral Status:  Neurologic State: Alert  Orientation Level: Oriented to person;Oriented to place  Cognition: Follows commands     Perseveration: Perseverates during conversation  Safety/Judgement: Awareness of environment;Decreased insight into deficits; Decreased awareness of need for safety    Hearing: Auditory  Auditory Impairment: None    Vision/Perceptual:                                Corrective Lenses: Glasses    Range of Motion:  B UE WFL                            Strength:  B UE WFL                   Coordination:     Fine Motor Skills-Upper: Left Intact; Right Intact    Gross Motor Skills-Upper: Left Intact; Right Intact      Balance:  Sitting: Impaired; With support  Sitting - Static: Fair (occasional); Good (unsupported)  Sitting - Dynamic: Fair (occasional)  Standing: Impaired; With support  Standing - Static: Constant support;Poor  Standing - Dynamic : Poor    Functional Mobility and Transfers for ADLs:  Bed Mobility:  Supine to Sit: Stand-by assistance;Bed Modified  Sit to Supine: Moderate assistance  Scooting: Moderate assistance;Assist x2    Transfers:  Sit to Stand: Minimum assistance; Moderate assistance;Assist x2  Stand to Sit: Minimum assistance; Moderate assistance  Bed to Chair:  (pt unable)  Toilet Transfer :  (Not assessed/unable)    ADL Assessment:  Feeding: Setup    Oral Facial Hygiene/Grooming: Minimum assistance (in bed)    Bathing: Maximum assistance    Upper Body Dressing: Minimum assistance    Lower Body Dressing: Maximum assistance    Toileting: Total assistance                ADL Intervention and task modifications:  Educated patient and wife on proper bed mobility techniques. Encouraged her to elevated bed height at home to assist him with lower body ADL while he is in supine. Discussed equipment needs. He will be getting a hospital bed, w/c, and BSC through hospice. Cognitive Retraining  Safety/Judgement: Awareness of environment;Decreased insight into deficits; Decreased awareness of need for safety    Functional Measure:  Barthel Index:    Bathin  Bladder: 0  Bowels: 0  Groomin  Dressin  Feedin  Mobility: 0  Stairs: 0  Toilet Use: 5  Transfer (Bed to Chair and Back): 5  Total: 15/100        The Barthel ADL Index: Guidelines  1. The index should be used as a record of what a patient does, not as a record of what a patient could do. 2. The main aim is to establish degree of independence from any help, physical or verbal, however minor and for whatever reason. 3. The need for supervision renders the patient not independent. 4. A patient's performance should be established using the best available evidence. Asking the patient, friends/relatives and nurses are the usual sources, but direct observation and common sense are also important. However direct testing is not needed.   5. Usually the patient's performance over the preceding 24-48 hours is important, but occasionally longer periods will be relevant. 6. Middle categories imply that the patient supplies over 50 per cent of the effort. 7. Use of aids to be independent is allowed. Allen Licona., Barthel, D.W. (3573). Functional evaluation: the Barthel Index. [facility] (14)2. JAVIER Gamez, Shawna Truong., Iris Cervantes., [place], [address] [place] [address] (1999). Measuring the change indisability after inpatient rehabilitation; comparison of the responsiveness of the Barthel Index and Functional [place] Measure. Journal of Neurology, Neurosurgery, and Psychiatry, 66(4), 109-078. Zak López, N.J.A, MACIE Mack, & Doni Funk M.A. (2004.) Assessment of post-stroke quality of life in cost-effectiveness studies: The usefulness of the Barthel Index and the EuroQoL-5D. Quality of Life Research, 13, 427-43        Pain:  No complaints    Activity Tolerance:   Fair    After treatment patient left in no apparent distress:   Supine in bed and Call bell within reach    COMMUNICATION/COLLABORATION:   The patients plan of care was discussed with: Physical therapist, Registered nurse, and Certified nursing assistant/patient care technician.      RUSSELL Little/L  Time Calculation: 28 mins

## 2021-05-20 NOTE — PROGRESS NOTES
Physical Therapy    Chart reviewed and noted that pt is now comfort care and will discharging home with hospice services. Will sign off at this time.

## 2021-05-20 NOTE — PROGRESS NOTES
PHYSICAL THERAPY REEVALUATION WITH DISCHARGE  Patient: Haim Alexander (59 y.o. male)  Date: 5/20/2021  Primary Diagnosis: Acute hypoxemic respiratory failure ([facility] [address] [J96.01]  CHF exacerbation ([facility]) [I50.9]  CAP (community acquired pneumonia) [J18.9]       Precautions:  Fall      ASSESSMENT  Based on the objective data described below, the patient presents with generalized weakness/debility, O2 dependent, impaired mobility, impaired balance, and decreased activity tolerance. Pt received in bed, agreeable to PT session. Wife present and requesting to see how pt does to know how to assist pt and what to assist him with. Pt able to move to sitting eob with hob elevated. He required moderate assistance to scoot eob and assist of 2 to stand at [address] [place] [address]. Verbal cues given for hand placement with sit<>stand. Pt with posterior and R lateral lean and lob in sitting and a heavy posterior lean in standing. Pt fatigues very quickly but very motivated to try things on his own. Second standing trial with decreased quality stance and pt unable to advance either LE to side step or advance forward. Pt returned to supine in bed with min a and scooted up in bed with max a x 2 with bed in trendelenburg. Pt discharging home with hospice services and will have all equipment needs met by hospice. Pt instructed in BLE exercises. Functional Outcome Measure: The patient scored 15/100 on the Barthel Index outcome measure which is indicative of 85% impaired function/adls. Other factors to consider for discharge: requires assist x 2 stand and pivot transfer, unable to ambulate          PLAN :  Patient is discharged from skilled acute physical therapy at this time    Recommendation for discharge: (in order for the patient to meet his/her long term goals)  No skilled physical therapy/ follow up rehabilitation needs identified at this time.     This discharge recommendation:  Has been made in collaboration with the attending provider and/or case management    Equipment recommendations for successful discharge: bedside commode, gait belt, portable O2, wheelchair, and hospital bed         SUBJECTIVE:   Patient stated what instructions are you leaving me.     OBJECTIVE DATA SUMMARY:   HISTORY:    Past Medical History:   Diagnosis Date    Afib ([facility] [address]     Dilated aortic root ([facility] [address]     Diverticulosis     Dyslipidemia     GERD (gastroesophageal reflux disease)     HTN (hypertension)     Hx of colonic polyps     Hyponatremia 2/23/2021    Mild valvular heart disease     Prostate cancer ([address] [address] 2014    s/p XRT (proton beam)     Past Surgical History:   Procedure Laterality Date   [facility] course since last seen and reason for reevaluation: pt with initial PT evaluation 5/17/2021, pt with a medical decline and transferred to the ICU. Pt transferred to oncology 5/19/2021 and it was decided pt will be comfort care and discharge home with Hospice. Home Situation  Home Environment: Private residence  # Steps to Enter: 3  Rails to Enter: Yes  Hand Rails : Bilateral  One/Two Story Residence: One story  Living Alone: No  Support Systems: Family member(s), Friends \ neighbors, Jennifer Bovey / chava community  Patient Expects to be Discharged to[de-identified] Unknown  Current DME Used/Available at Home: Cane, straight, Shower chair, Walker, rolling  Tub or Shower Type: Shower    EXAMINATION/PRESENTATION/DECISION MAKING:   Critical Behavior:  Neurologic State: Alert  Orientation Level: Oriented to person, Oriented to place  Cognition: Follows commands  Safety/Judgement: Awareness of environment, Decreased insight into deficits, Decreased awareness of need for safety  Hearing:   Auditory  Auditory Impairment: None  Range Of Motion:  AROM: Generally decreased, functional           PROM: Generally decreased, functional           Strength:    Strength: Generally decreased, functional       Tone & Sensation:   Tone: Normal Coordination:  Coordination: Generally decreased, functional  Vision:   Corrective Lenses: Glasses  Functional Mobility:  Bed Mobility:     Supine to Sit: Stand-by assistance;Bed Modified  Sit to Supine: Moderate assistance  Scooting: Moderate assistance;Assist x2  Transfers:  Sit to Stand: Minimum assistance; Moderate assistance;Assist x1;Assist x2  Stand to Sit: Minimum assistance; Moderate assistance        Bed to Chair:  (pt unable)              Balance:   Sitting: Impaired; With support  Sitting - Static: Fair (occasional); Good (unsupported)  Sitting - Dynamic: Fair (occasional)  Standing: Impaired; With support  Standing - Static: Constant support;Poor  Standing - Dynamic : Poor  Ambulation/Gait Training:   pt unable    Functional Measure:  Barthel Index:    Bathin  Bladder: 0  Bowels: 0  Groomin  Dressin  Feedin  Mobility: 0  Stairs: 0  Toilet Use: 5  Transfer (Bed to Chair and Back): 5  Total: 15/100       The Barthel ADL Index: Guidelines  1. The index should be used as a record of what a patient does, not as a record of what a patient could do. 2. The main aim is to establish degree of independence from any help, physical or verbal, however minor and for whatever reason. 3. The need for supervision renders the patient not independent. 4. A patient's performance should be established using the best available evidence. Asking the patient, friends/relatives and nurses are the usual sources, but direct observation and common sense are also important. However direct testing is not needed. 5. Usually the patient's performance over the preceding 24-48 hours is important, but occasionally longer periods will be relevant. 6. Middle categories imply that the patient supplies over 50 per cent of the effort. 7. Use of aids to be independent is allowed. Cheli Howard., Barthel, D.W. (3696). Functional evaluation: the Barthel Index. [facility] (14)2.   JAVIER Barber, Nory Lara, Tasia Richards. (1999). Measuring the change indisability after inpatient rehabilitation; comparison of the responsiveness of the Barthel Index and Functional [place] Measure. Journal of Neurology, Neurosurgery, and Psychiatry, 66(4), 169-273. FLO Dotson, MACIE Mack, & Terrence Acosta M.A. (2004.) Assessment of post-stroke quality of life in cost-effectiveness studies: The usefulness of the Barthel Index and the EuroQoL-5D. Quality of Life Research, 13, 427-43           Pain Rating:  None reported    Activity Tolerance:   Fair, Poor and requires frequent rest breaks    After treatment patient left in no apparent distress:   Supine in bed, hob elevated , Call bell within reach and Caregiver / family present    COMMUNICATION/EDUCATION:   The patients plan of care was discussed with: Occupational therapist.     Fall prevention education was provided and the patient/caregiver indicated understanding.     Thank you for this referral.  Sadiq Rudolph, PT   Time Calculation: 28 mins

## 2021-05-20 NOTE — PROGRESS NOTES
Problem: Falls - Risk of  Goal: *Absence of Falls  Outcome: Resolved/Met  Note: Fall Risk Interventions:  Mobility Interventions: Communicate number of staff needed for ambulation/transfer, Patient to call before getting OOB, PT Consult for mobility concerns    Mentation Interventions: Adequate sleep, hydration, pain control, Door open when patient unattended, Eyeglasses and hearing aids    Medication Interventions: Evaluate medications/consider consulting pharmacy    Elimination Interventions: Toileting schedule/hourly rounds              Problem: Falls - Risk of  Goal: *Absence of Falls  Outcome: Resolved/Met  Note: Fall Risk Interventions:  Mobility Interventions: Communicate number of staff needed for ambulation/transfer, Patient to call before getting OOB, PT Consult for mobility concerns    Mentation Interventions: Adequate sleep, hydration, pain control, Door open when patient unattended, Eyeglasses and hearing aids    Medication Interventions: Evaluate medications/consider consulting pharmacy    Elimination Interventions:  Toileting schedule/hourly rounds              Problem: Patient Education: Go to Patient Education Activity  Goal: Patient/Family Education  Outcome: Resolved/Met     Problem: Patient Education: Go to Patient Education Activity  Goal: Patient/Family Education  Outcome: Resolved/Met     Problem: Breathing Pattern - Ineffective  Goal: *Absence of hypoxia  Outcome: Resolved/Met  Goal: *Use of effective breathing techniques  Outcome: Resolved/Met  Goal: *PALLIATIVE CARE:  Alleviation of Dyspnea  Outcome: Resolved/Met     Problem: Breathing Pattern - Ineffective  Goal: *Absence of hypoxia  Outcome: Resolved/Met     Problem: Breathing Pattern - Ineffective  Goal: *Use of effective breathing techniques  Outcome: Resolved/Met     Problem: Breathing Pattern - Ineffective  Goal: *PALLIATIVE CARE:  Alleviation of Dyspnea  Outcome: Resolved/Met     Problem: Patient Education: Go to Patient Education Activity  Goal: Patient/Family Education  Outcome: Resolved/Met     Problem: Patient Education: Go to Patient Education Activity  Goal: Patient/Family Education  Outcome: Resolved/Met     Problem: Patient Education: Go to Patient Education Activity  Goal: Patient/Family Education  Outcome: Resolved/Met     Problem: Patient Education: Go to Patient Education Activity  Goal: Patient/Family Education  Outcome: Resolved/Met     Problem: Pressure Injury - Risk of  Goal: *Prevention of pressure injury  Outcome: Resolved/Met  Note: Pressure Injury Interventions:  Sensory Interventions: Pressure redistribution bed/mattress (bed type)    Moisture Interventions: Maintain skin hydration (lotion/cream), Internal/External urinary devices    Activity Interventions: PT/OT evaluation    Mobility Interventions: Pressure redistribution bed/mattress (bed type)    Nutrition Interventions: Document food/fluid/supplement intake    Friction and Shear Interventions: HOB 30 degrees or less                Problem: Patient Education: Go to Patient Education Activity  Goal: Patient/Family Education  Outcome: Resolved/Met

## 2021-05-20 NOTE — PROGRESS NOTES
Occupational Therapy  Chart reviewed and noted that patient is now comfort care and will discharging home with hospice services. Will complete the order and sign off at this time.

## 2021-05-20 NOTE — PROGRESS NOTES
Discharge instructions reviewed and received . Verbalizes understanding. IV'S removed. Condition stable.  [facility]

## 2021-05-20 NOTE — DISCHARGE INSTRUCTIONS
Patient Education        Heart Rhythm Problems in Heart Failure: Care Instructions  Your Care Instructions     A heart rhythm problem, or arrhythmia, is a change in the normal rhythm of your heart. Your heart may beat too fast or too slow or beat with an irregular or skipping rhythm. A change in the heart's rhythm may feel like a really strong heartbeat or a fluttering in your chest. A severe heart rhythm problem can keep the body from getting the blood it needs. This can cause shortness of breath, lightheadedness, and fainting. A heart rhythm problem can make your heart failure worse and increase your chance of dying suddenly. You may take medicine to treat your condition. Your doctor may recommend a pacemaker, an implantable cardioverter-defibrillator (ICD), or a procedure called catheter ablation to destroy small parts of the heart that are causing a rhythm problem. Follow-up care is a key part of your treatment and safety. Be sure to make and go to all appointments, and call your doctor if you are having problems. It's also a good idea to know your test results and keep a list of the medicines you take. How can you care for yourself at home? · Take your medicines exactly as prescribed. Talk to your doctor if you have any problems with your medicines. · If you received a pacemaker or an implantable cardioverter-defibrillator (ICD), you will get a fact sheet about it. · Wear a medical alert ID bracelet. You can buy one at most drugstores or order it online. · Make sure you go to your follow-up appointments. To change your lifestyle  · Do not smoke. · Eat a heart-healthy diet. · Limit or avoid alcohol. · Stay at a healthy weight. Lose weight if you need to. · Ask your doctor whether you can take over-the-counter medicines (such as decongestants). These can make your heart beat fast.  Be active   · Start light exercise if your doctor says you can.  Even a small amount will help you get stronger, have more energy, and manage your stress. · Walk to get exercise easily. Start by walking a little more than you did the day before. Bit by bit, increase the amount you walk. · When you exercise, watch for signs that your heart is working too hard. You are pushing too hard if you cannot talk while you exercise. If you become short of breath or dizzy or have chest pain, sit down and rest.  · If your doctor has not set you up with a cardiac rehabilitation (rehab) program, talk to him or her about whether that is right for you. Cardiac rehab includes exercise, help with diet and lifestyle changes, and emotional support. It may reduce your risk of future heart problems. · Check your pulse daily. Place two fingers on the artery at the palm side of your wrist, in line with your thumb. If your heartbeat seems uneven, talk to your doctor. When should you call for help? Call 911 anytime you think you may need emergency care. For example, call if:    · You have symptoms of sudden heart failure, such as:  ? Severe trouble breathing. ? Coughing up pink, foamy mucus. ? A new irregular or rapid heartbeat.     · You have symptoms of a heart attack. These may include:  ? Chest pain or pressure, or a strange feeling in the chest.  ? Sweating. ? Shortness of breath. ? Nausea or vomiting. ? Pain, pressure, or a strange feeling in the back, neck, jaw, or upper belly or in one or both shoulders or arms. ? Lightheadedness or sudden weakness. ? A fast or irregular heartbeat. After you call 911, the  may tell you to chew 1 adult-strength or 2 to 4 low-dose aspirin. Wait for an ambulance. Do not try to drive yourself. Call your doctor now or seek immediate medical care if:    · You have new or increased shortness of breath.     · You are dizzy or lightheaded, or you feel like you may faint.     · You have sudden weight gain, such as more than 2 to 3 pounds in a day or 5 pounds in a week.  (Your doctor may suggest a different range of weight gain.)     · You have increased swelling in your legs, ankles, or feet.     · You are suddenly so tired or weak that you cannot do your usual activities. Watch closely for changes in your health, and be sure to contact your doctor if you develop new symptoms. Where can you learn more? Go to http://www.[employer]/  Enter E118 in the search box to learn more about \"Heart Rhythm Problems in Heart Failure: Care Instructions. \"  Current as of: August 31, 2020               Content Version: 12.8  © 6236-1064 [employer]. Care instructions adapted under license by [employer] (which disclaims liability or warranty for this information). If you have questions about a medical condition or this instruction, always ask your healthcare professional. [address] any warranty or liability for your use of this information.

## 2021-05-20 NOTE — PROGRESS NOTES
Palliative Medicine Consult  Jae: [phone] ([phone])    Patient Name: Bev Archuleta  YOB: 1926    Date of Initial Consult: 5/19/21  Reason for Consult: end of life care  Requesting Provider: Haroon Augustine MD  Primary Care Physician: Kesha Boone MD     SUMMARY:   Bev Archuleta is a 80 y. o. with a past history of afib, HTN, dilated aortic root, diverticulosis, GERD, colonic polyps, prostate cancer who was transferred on 5/14/2021 from [facility] with a diagnosis of acute hypercarbic respiratory failure. Current medical issues leading to Palliative Medicine involvement include: care decisions. 5/18:   RRT called due to unresponsiveness, code stroke called. Placed on bipap, transferred to PCU. EEG abnormal, moderate generalized slowing as seen in encephalopathies/diffuse cerebral dysfunction. There is no focal asymmetry, seizures or epileptiform discharges seen. Upon arrival to PCU RRT called again due to pt obtunded, sats 72%, transferred to CCU. HEAD CTA: 1. No evidence of large vessel occlusion or hemodynamically significant stenosis. 2.  Technically limited perfusion study due to motion without diagnostic   Information. HEAD CT: No acute intracranial abnormalities. 5/19: last pm NP discussed GOC with Harini Johnson, who report pt has a living will that states he does not want any extraordinary measures in the event of critical illness, and that he is DNR/DNI. This am, pt is awake, alert, oriented to self, place, situation, wants to go home. 5/20: awake, alert, eager to go home. Psychosocial: lives with [address] [place] [address] [phone] in one level home, independent with ADLs/IADLs, no DMEs. Friend drives him to appts. Pt's nephew/ NOK  Alexandre Friend  [phone]. Per pt and Samella Peeling, AMD indicates Samella Peeling is mPOA as well as financial POA. PALLIATIVE DIAGNOSES:   1. SOB  2. Orthopnea  3. Sore throat   4. Acute encephalopathy   5.  Acute hypercarbic respiratory failure 6. afib with RVR  7. Poor appetite  8. Protein-calorie malnutrition: 7% weight loss in past 3 months  9. Physical debility: severe weakness, requiring mod assist x 2 for safe stand-pivot  10. Goals of care   PLAN:   1. Prior to visit, I completed a review of patient's medical records, including medical documentation, vital signs, MARs, and results of various labs and other diagnostics. I also spoke with patient's nurse Alex Pierre. 2. Met with pt and Anibal Maradiaga: they are asking when PT will be by as they would like to know what pt's level of care needs are before he leaves, ie, will Kirillmiles Cohentara be able to assist pt to bedside commode or will he need 2 people to assist.  Pt would like to get up out of the bed as his backside hurts from lying/sitting in bed for the past 6 days. Anibal Cohens reports plan is for transport home via ambulance at 2pm, she will be able to ride along, Hospice RN will meet them upon arrival home. Visiting Ishan Diana will be out to assess pt tomorrow. 1. Spoke with Betsy Haley, physical therapist, discussed reason for consult, placed new consult order in chart. Requested she see pt prior to 2pm as plan is for discharge at that time. 2. DDNR completed for discharge. 3. Initial consult note routed to primary continuity provider and/or primary health care team members  4.  Communicated plan of care with: Palliative ID[facility] Team     GOALS OF CARE / TREATMENT PREFERENCES:     GOALS OF CARE:  Patient/Health Care Proxy Stated Goals: Comfort    TREATMENT PREFERENCES:   Code Status: DNR    Advance Care Planning:  [x] [facility] Interdisciplinary Team has updated the ACP Navigator with Health Care Decision Maker and Patient Capacity      Primary Decision Maker: Franklin Mckeon - [phone]    Secondary Decision Maker: David Lynn 69 - [phone]  Advance Care Planning 5/19/2021   Patient's Healthcare Decision Maker is: Named in scanned ACP document   Confirm Advance Directive Yes, not on file       Medical Interventions: Comfort measures        Artificially Administered Nutrition: No feeding tube     Other:    As far as possible, the palliative care team has discussed with patient / health care proxy about goals of care / treatment preferences for patient. HISTORY:     History obtained from: chart    CHIEF COMPLAINT: SOB    HPI/SUBJECTIVE:    The patient is:   [x] Verbal and participatory   [] Non-participatory due to:     5/14: BIBA to [facility] ED  with c/o SOB x 1 week that has gradually worsened over time with associated sore throat and orthopnea. Initially he c/o COURTNEY that improved with rest, however, over the past 2 days SOB was not relieved with rest.  Transferred to [facility] due to intermittent responsiveness and chronic respiratory acidosis, for routine progression of care. Chest CTA: bibasilar infiltrates, no pulmonary emboli.    Head CT:    Clinical Pain Assessment (nonverbal scale for severity on nonverbal patients):   Clinical Pain Assessment  Severity: 4  Location: generalized  Character: achy  Duration: days  Effect: movement  Factors: lying in bed too long  Frequency: constant     Activity (Movement): Lying quietly, normal position    Duration: for how long has pt been experiencing pain (e.g., 2 days, 1 month, years)  Frequency: how often pain is an issue (e.g., several times per day, once every few days, constant)     FUNCTIONAL ASSESSMENT:     Palliative Performance Scale (PPS):  PPS: 30       PSYCHOSOCIAL/SPIRITUAL SCREENING:     Palliative IDT has assessed this patient for cultural preferences / practices and a referral made as appropriate to needs (Cultural Services, Patient Advocacy, Ethics, etc.)    Any spiritual / Religion concerns:  [] Yes /  [x] No    Caregiver Burnout:  [] Yes /  [x] No /  [] No Caregiver Present      Anticipatory grief assessment:   [x] Normal  / [] Maladaptive       ESAS Anxiety: Anxiety: 0    ESAS Depression: Depression: 3 REVIEW OF SYSTEMS:     Positive and pertinent negative findings in ROS are noted above in HPI. The following systems were [x] reviewed / [] unable to be reviewed as noted in HPI  Other findings are noted below. Systems: constitutional, ears/nose/mouth/throat, respiratory, gastrointestinal, genitourinary, musculoskeletal, integumentary, neurologic, psychiatric, endocrine. Positive findings noted below. Modified ESAS Completed by: provider   Fatigue: 8 Drowsiness: 0   Depression: 3 Pain: 4   Anxiety: 0 Nausea: 0   Anorexia: 10 Dyspnea: 0     Constipation: No     Stool Occurrence(s): 1        PHYSICAL EXAM:     From RN flowsheet:  Wt Readings from Last 3 Encounters:   05/19/21 138 lb 3.7 oz (62.7 kg)   04/20/21 135 lb 3.2 oz (61.3 kg)   03/04/21 143 lb 9.6 oz (65.1 kg)     Blood pressure 127/67, pulse 77, temperature 97.7 °F (36.5 °C), resp. rate 18, height 5' 9\" (1.753 m), weight 138 lb 3.7 oz (62.7 kg), SpO2 98 %.     Pain Scale 1: Numeric (0 - 10)  Pain Intensity 1: 0     Pain Location 1: Generalized           Last bowel movement, if known:     Constitutional: elderly, frail, pleasant, NAD, AAO x3  Eyes: pupils equal, anicteric  ENMT: no nasal discharge, moist mucous membranes  Cardiovascular: regular rhythm, distal pulses intact  Respiratory: breathing not labored, symmetric, NC02  Gastrointestinal: soft non-tender, +bowel sounds  Musculoskeletal: no deformity, no tenderness to palpation  Skin: warm, dry  Neurologic: following commands, moving all extremities  Psychiatric: full affect, no hallucinations         HISTORY:     Principal Problem:    CAP (community acquired pneumonia) (5/14/2021)    Active Problems:    Hypertension (5/31/2014)      Dyslipidemia ()      CHF exacerbation ([facility] [address] (5/14/2021)      Acute hypoxemic respiratory failure (HCC) (5/14/2021)      PAF (paroxysmal atrial fibrillation) (HCC) (5/15/2021)      Poor appetite (5/19/2021)      Moderate protein-calorie malnutrition (HCC) (5/19/2021)      Physical debility (5/19/2021)      Goals of care, counseling/discussion (5/19/2021)      Past Medical History:   Diagnosis Date    Afib ([facility] [address]     Dilated aortic root (HCC)     Diverticulosis     Dyslipidemia     GERD (gastroesophageal reflux disease)     HTN (hypertension)     Hx of colonic polyps     Hyponatremia 2/23/2021    Mild valvular heart disease     Prostate cancer ([facility] [address] 2014    s/p XRT (proton beam)      Past Surgical History:   Procedure Laterality Date    HX ORTHOPAEDIC      HX UROLOGICAL        Family History   Problem Relation Age of Onset    Stroke Father     Hypertension Father     Cancer Sister         colon    Hypertension Mother       History reviewed, no pertinent family history.   Social History     Tobacco Use    Smoking status: Never Smoker    Smokeless tobacco: Never Used   Substance Use Topics    Alcohol use: No     Comment: quit 20 years ago- states he was a heavy drinker     Allergies   Allergen Reactions    Oxycodone-Acetaminophen Other (comments)     Cloudy conscious      Prednisone Other (comments)      Current Facility-Administered Medications   Medication Dose Route Frequency    alcohol 62% (NOZIN) nasal  1 Ampule  1 Ampule Topical Q12H    amiodarone (CORDARONE) tablet 400 mg  400 mg Oral BID    metoprolol tartrate (LOPRESSOR) tablet 25 mg  25 mg Oral BID    acetaminophen (TYLENOL) tablet 650 mg  650 mg Oral Q6H PRN    levalbuterol (XOPENEX) nebulizer soln 0.63 mg/3 mL  0.63 mg Nebulization Q6H PRN    apixaban (ELIQUIS) tablet 2.5 mg  2.5 mg Oral BID    sodium chloride (NS) flush 5-40 mL  5-40 mL IntraVENous Q8H    sodium chloride (NS) flush 5-40 mL  5-40 mL IntraVENous PRN    polyethylene glycol (MIRALAX) packet 17 g  17 g Oral DAILY PRN    ondansetron (ZOFRAN) injection 4 mg  4 mg IntraVENous Q6H PRN          LAB AND IMAGING FINDINGS:     Lab Results   Component Value Date/Time    WBC 6.9 05/20/2021 05:32 AM    HGB 9.6 (L) 05/20/2021 05:32 AM    PLATELET 845 (L) 04/52/2188 05:32 AM     Lab Results   Component Value Date/Time    Sodium 127 (L) 05/20/2021 05:32 AM    Potassium 4.3 05/20/2021 05:32 AM    Chloride 84 (L) 05/20/2021 05:32 AM    CO2 44 (HH) 05/20/2021 05:32 AM    BUN 8 05/20/2021 05:32 AM    Creatinine 0.26 (L) 05/20/2021 05:32 AM    Calcium 8.2 (L) 05/20/2021 05:32 AM    Magnesium 2.1 05/19/2021 04:54 AM    Phosphorus 2.3 (L) 05/19/2021 04:54 AM      Lab Results   Component Value Date/Time    Alk. phosphatase 78 05/18/2021 07:44 PM    Protein, total 6.0 (L) 05/18/2021 07:44 PM    Albumin 3.0 (L) 05/18/2021 07:44 PM    Globulin 3.0 05/18/2021 07:44 PM     Lab Results   Component Value Date/Time    INR 1.1 05/18/2021 07:44 PM    Prothrombin time 11.5 (H) 05/18/2021 07:44 PM      No results found for: IRON, FE, TIBC, IBCT, PSAT, FERR   Lab Results   Component Value Date/Time    pH 7.34 (L) 05/18/2021 08:34 PM    PCO2 82 (H) 05/18/2021 08:34 PM    PO2 235 (H) 05/18/2021 08:34 PM     No components found for: [place]   Lab Results   Component Value Date/Time     08/03/2018 11:15 PM    CK - MB 2.5 08/03/2018 11:15 PM                Total time: 35  Counseling / coordination time, spent as noted above: 25  > 50% counseling / coordination?: y    Prolonged service was provided for  []30 min   []75 min in face to face time in the presence of the patient, spent as noted above. Time Start:   Time End:   Note: this can only be billed with 28123 (initial) or 90274 (follow up). If multiple start / stop times, list each separately.

## 2021-05-27 ENCOUNTER — TELEPHONE (OUTPATIENT)
Dept: ONCOLOGY | Age: 86
End: 2021-05-27

## 2021-09-29 NOTE — ED PROVIDER NOTES
EMERGENCY DEPARTMENT HISTORY AND PHYSICAL EXAM      Date: 5/14/2021  Patient Name: Reena Wyman    History of Presenting Illness     Chief Complaint   Patient presents with    Shortness of Breath       History Provided By: Patient and EMS    HPI:   The history is provided by the patient. Shortness of Breath  This is a new problem. The problem occurs continuously. The current episode started more than 1 week ago. The problem has been gradually worsening. Associated symptoms include sore throat and orthopnea. Pertinent negatives include no fever, no headaches, no coryza, no rhinorrhea, no neck pain, no cough, no hemoptysis, no wheezing, no chest pain, no vomiting, no abdominal pain, no rash, no leg pain and no leg swelling. He has tried nothing for the symptoms. The treatment provided no relief. He has had prior ED visits. Associated medical issues do not include COPD or heart failure. Reena Wyman, 80 y.o. male  presents to the ED with cc of shortness of breath. Patient reports has been short of breath for several weeks usually occurs with exertion however when he stops and rests the shortness of breath goes away. Reports over the last 2 days his shortness of breath has not improved with rest.  He denies any chest pain. He reports he had a sore throat several days ago and reports constipation. Denies any chest pain abdominal pain nausea vomiting diarrhea. Denies any fevers chills cough cold. He does report a sore throat several days ago but that has resolved. He has a history of atrial fibrillation denies history of CHF and COPD. He reports that he is currently taking Eliquis. He was noted by EMS to have sats in the upper 80s on room air. There are no other complaints, changes, or physical findings at this time. PCP: Chin Pabon MD    No current facility-administered medications on file prior to encounter.       Current Outpatient Medications on File Prior to Encounter   Medication Sig Dispense Refill    omeprazole (PRILOSEC) 10 mg capsule Take 10 mg by mouth daily.  silodosin (RAPAFLO) 4 mg capsule Take 4 mg by mouth daily (with breakfast).  krill oil 500 mg cap Take 1 Cap by mouth daily (with breakfast).  cholecalciferol (Vitamin D3) 25 mcg (1,000 unit) cap Take 1,000 Units by mouth daily.  dilTIAZem ER (CARDIZEM LA) 180 mg tablet Take 1 Tab by mouth daily. 30 Tab 0    rosuvastatin (CRESTOR) 5 mg tablet Take 1 Tab by mouth nightly.  metoprolol tartrate (LOPRESSOR) 50 mg tablet Take 1 Tab by mouth two (2) times a day. 60 Tab 0    polyethylene glycol (Miralax) 17 gram/dose powder Take 17 g by mouth.  antiox #8/om3/dha/epa/lut/zeax (PRESERVISION AREDS 2, OMEGA-3, PO) Take 1 Cap by mouth two (2) times a day.  apixaban (Eliquis) 2.5 mg tablet Take 1 Tab by mouth two (2) times a day. 61 Tab 0       Past History     Past Medical History:  Past Medical History:   Diagnosis Date    [facility] [address]     Dilated aortic root ([facility] [address] [facility]     Diverticulosis     Dyslipidemia     GERD (gastroesophageal reflux disease)     HTN (hypertension)     Hx of colonic polyps     Hyponatremia 2/23/2021    Mild valvular heart disease     Prostate cancer ([facility] [address] 2014    s/p XRT (proton beam)       Past Surgical History:  Past Surgical History:   Procedure Laterality Date    HX ORTHOPAEDIC      HX UROLOGICAL         Family History:  Family History   Problem Relation Age of Onset    Stroke Father     Hypertension Father     Cancer Sister         colon    Hypertension Mother        Social History:  Social History     Tobacco Use    Smoking status: Never Smoker    Smokeless tobacco: Never Used   Substance Use Topics    Alcohol use: No     Comment: quit 20 years ago- states he was a heavy drinker    Drug use: Never       Allergies:   Allergies   Allergen Reactions    Oxycodone-Acetaminophen Other (comments)     Cloudy conscious      Prednisone Other (comments)         Review of Systems   Review of Systems   Constitutional: Negative. Negative for chills and fever. HENT: Positive for sore throat. Negative for congestion and rhinorrhea. Eyes: Negative. Negative for discharge and redness. Respiratory: Positive for shortness of breath. Negative for cough, hemoptysis and wheezing. Cardiovascular: Positive for orthopnea. Negative for chest pain, palpitations and leg swelling. Gastrointestinal: Negative. Negative for abdominal pain, nausea and vomiting. Endocrine: Negative. Negative for polydipsia and polyuria. Genitourinary: Negative. Negative for dysuria, flank pain and frequency. Musculoskeletal: Negative. Negative for arthralgias, back pain and neck pain. Skin: Negative. Negative for rash and wound. Allergic/Immunologic: Negative. Neurological: Negative. Negative for dizziness and headaches. Hematological: Negative. Negative for adenopathy. Does not bruise/bleed easily. Psychiatric/Behavioral: Negative. Negative for confusion. The patient is not nervous/anxious. All other systems reviewed and are negative. Physical Exam   Physical Exam  Vitals signs and nursing note reviewed. Constitutional:       General: He is not in acute distress. Appearance: Normal appearance. He is well-developed. He is not ill-appearing, toxic-appearing or diaphoretic. Comments: Thin elderly male   HENT:      Head: Normocephalic and atraumatic. Nose: Nose normal.      Mouth/Throat:      Mouth: Mucous membranes are moist.      Pharynx: Oropharynx is clear. Eyes:      Extraocular Movements: Extraocular movements intact. Conjunctiva/sclera: Conjunctivae normal.      Pupils: Pupils are equal, round, and reactive to light. Neck:      Musculoskeletal: Normal range of motion and neck supple. No neck rigidity or muscular tenderness. Cardiovascular:      Rate and Rhythm: Normal rate. Rhythm irregular. Pulses: Normal pulses.    Pulmonary:      Effort: Pulmonary effort is normal. No respiratory distress. Breath sounds: No stridor. Examination of the right-lower field reveals rales. Examination of the left-lower field reveals rales. Rales present. No rhonchi. Abdominal:      General: There is no distension. Palpations: Abdomen is soft. Musculoskeletal: Normal range of motion. General: No swelling or tenderness. Right lower leg: No edema. Left lower leg: No edema. Skin:     General: Skin is warm and dry. Capillary Refill: Capillary refill takes less than 2 seconds. Findings: No erythema or rash. Neurological:      General: No focal deficit present. Mental Status: He is alert and oriented to person, place, and time. Mental status is at baseline. Cranial Nerves: No cranial nerve deficit. Sensory: No sensory deficit.    Psychiatric:         Mood and Affect: Mood normal.         Behavior: Behavior normal.         Diagnostic Study Results     Labs -     Recent Results (from the past 12 hour(s))   BLOOD GAS, ARTERIAL    Collection Time: 05/14/21  3:20 PM   Result Value Ref Range    pH 7.37 7.35 - 7.45      PCO2 69 (H) 35 - 45 mmHg    PO2 46 (LL) 80 - 100 mmHg    O2 SAT 79 (L) 92 - 97 %    BICARBONATE 39 (H) 22 - 26 mmol/L    BASE EXCESS 10.1 mmol/L    O2 METHOD ROOM AIR      Sample source ARTERIAL      SITE RIGHT RADIAL      ABISAI'S TEST YES      Critical value read back Called to Enrique Donnelly MD on 05/14/2021 at 15:24    CBC WITH AUTOMATED DIFF    Collection Time: 05/14/21  3:21 PM   Result Value Ref Range    WBC 10.4 4.1 - 11.1 K/uL    RBC 3.73 (L) 4.10 - 5.70 M/uL    HGB 11.4 (L) 12.1 - 17.0 g/dL    HCT 34.9 (L) 36.6 - 50.3 %    MCV 93.6 80.0 - 99.0 FL    MCH 30.6 26.0 - 34.0 PG    MCHC 32.7 30.0 - 36.5 g/dL    RDW 12.7 11.5 - 14.5 %    PLATELET 397 (L) 579 - 400 K/uL    MPV 9.2 8.9 - 12.9 FL    NRBC 0.0 0  WBC    ABSOLUTE NRBC 0.00 0.00 - 0.01 K/uL    NEUTROPHILS 92 (H) 32 - 75 %    BAND NEUTROPHILS 2 % LYMPHOCYTES 3 (L) 12 - 49 %    MONOCYTES 3 (L) 5 - 13 %    EOSINOPHILS 0 0 - 7 %    BASOPHILS 0 0 - 1 %    IMMATURE GRANULOCYTES 0 0.0 - 0.5 %    ABS. NEUTROPHILS 9.8 (H) 1.8 - 8.0 K/UL    ABS. LYMPHOCYTES 0.3 (L) 0.8 - 3.5 K/UL    ABS. MONOCYTES 0.3 0.0 - 1.0 K/UL    ABS. EOSINOPHILS 0.0 0.0 - 0.4 K/UL    ABS. BASOPHILS 0.0 0.0 - 0.1 K/UL    ABS. IMM. GRANS. 0.0 0.00 - 0.04 K/UL    DF MANUAL      PLATELET COMMENTS ADEQUATE PLATELETS      RBC COMMENTS NORMOCYTIC, NORMOCHROMIC     PROTHROMBIN TIME + INR    Collection Time: 05/14/21  3:21 PM   Result Value Ref Range    INR 1.2 (H) 0.9 - 1.1      Prothrombin time 11.6 (H) 9.0 - 52.4 sec   METABOLIC PANEL, COMPREHENSIVE    Collection Time: 05/14/21  3:21 PM   Result Value Ref Range    Sodium 129 (L) 136 - 145 mmol/L    Potassium 4.0 3.5 - 5.1 mmol/L    Chloride 89 (L) 97 - 108 mmol/L    CO2 38 (H) 21 - 32 mmol/L    Anion gap 2 (L) 5 - 15 mmol/L    Glucose 144 (H) 65 - 100 mg/dL    BUN 7 6 - 20 MG/DL    Creatinine 0.59 (L) 0.70 - 1.30 MG/DL    BUN/Creatinine ratio 12 12 - 20      GFR est AA >60 >60 ml/min/1.73m2    GFR est non-AA >60 >60 ml/min/1.73m2    Calcium 8.8 8.5 - 10.1 MG/DL    Bilirubin, total 0.6 0.2 - 1.0 MG/DL    ALT (SGPT) 44 12 - 78 U/L    AST (SGOT) 25 15 - 37 U/L    Alk.  phosphatase 73 45 - 117 U/L    Protein, total 6.6 6.4 - 8.2 g/dL    Albumin 3.4 (L) 3.5 - 5.0 g/dL    Globulin 3.2 2.0 - 4.0 g/dL    A-G Ratio 1.1 1.1 - 2.2     TROPONIN I    Collection Time: 05/14/21  3:21 PM   Result Value Ref Range    Troponin-I, Qt. <0.05 <0.05 ng/mL   MAGNESIUM    Collection Time: 05/14/21  3:21 PM   Result Value Ref Range    Magnesium 2.1 1.6 - 2.4 mg/dL   LACTIC ACID    Collection Time: 05/14/21  3:21 PM   Result Value Ref Range    Lactic acid 1.1 0.4 - 2.0 MMOL/L   NT-PRO BNP    Collection Time: 05/14/21  3:21 PM   Result Value Ref Range    NT pro-BNP 7,493 (H) 0 - 450 PG/ML   COVID-19 WITH INFLUENZA A/B    Collection Time: 05/14/21  4:13 PM   Result Value Ref Range    SARS-CoV-2 Not detected NOTD      Influenza A by PCR Not detected NOTD      Influenza B by PCR Not detected NOTD         Radiologic Studies -   CT HEAD WO CONT   Final Result   No acute changes. CTA CHEST W OR W WO CONT   Final Result   Bibasal infiltrates , no evidence for pulmonary emboli. XR CHEST PORT   Final Result   Bibasilar patchy opacification. CT Results  (Last 48 hours)               05/14/21 1731  CT HEAD WO CONT Final result    Impression:  No acute changes. Narrative:  EXAM: CT HEAD WO CONT       INDICATION: AMS       COMPARISON: None. CONTRAST: None. TECHNIQUE: Unenhanced CT of the head was performed using 5 mm images. Brain and   bone windows were generated. Coronal and sagittal reformats. CT dose reduction   was achieved through use of a standardized protocol tailored for this   examination and automatic exposure control for dose modulation. FINDINGS:       There is no extra-axial fluid collection, hemorrhage or shift. Mild atrophy and nonspecific white matter changes. 05/14/21 1731  CTA CHEST W OR W WO CONT Final result    Impression:  Bibasal infiltrates , no evidence for pulmonary emboli. Narrative:  Clinical indication: Hypoxia, shortness of breath. Localizer obtained without contrast at the level of the pulmonary arteries. Fast   injection rate of 100 cc of Isovue-370 with review of the raw data and MIP   reconstructions. Comparison December 27, 2018. INTERPRETATION PROVIDED FOR   COMPLIANCE ONLY AT NO CHARGE        . Bibasilar infiltrate. No definite evidence for pulmonary emboli. Chronic   elevation of the right hemidiaphragm. No masses or adenopathy. No pericardial   pleural effusion no shift or pneumothorax. CXR Results  (Last 48 hours)               05/14/21 1516  XR CHEST PORT Final result    Impression:  Bibasilar patchy opacification.        Narrative:  EXAM: XR CHEST PORT       INDICATION: SOB       COMPARISON: January 29       FINDINGS: A portable AP radiograph of the chest was obtained at 1509 hours. The   patient is on a cardiac monitor. There is patchy opacification in the lower   lobes bilaterally. There is atherosclerosis of the aorta. The bones and soft   tissues are grossly within normal limits. Medical Decision Making   I am the first provider for this patient. I reviewed the vital signs, available nursing notes, past medical history, past surgical history, family history and social history. Vital Signs-Reviewed the patient's vital signs. Patient Vitals for the past 12 hrs:   Temp Pulse Resp BP SpO2   05/14/21 1831 98.3 °F (36.8 °C) 92 24 123/85 100 %   05/14/21 1611  89 25 123/66 100 %   05/14/21 1553  77 8 116/75 100 %   05/14/21 1550  78  116/75    05/14/21 1504 97.7 °F (36.5 °C)       05/14/21 1502  80 26 111/74 (!) 89 %           EKG interpretation: (Preliminary)  Rhythm: atrial fib;  irregular. Rate (approx.): 91; Blocks: RBBB; Ectopy: noneAxis: normal; P wave: ; QRS interval: prolonged; ST/T wave: non-specific changes; in  Lead: ; Other findings: .        Records Reviewed: Nursing Notes, Old Medical Records, Previous electrocardiograms, Previous Radiology Studies and Previous Laboratory Studies    Provider Notes (Medical Decision Making):   Patient presents with shortness of breath, patient is hypoxic on room air, will obtain labs x-ray ABG to assess etiology of shortness of breath. Plan to do Covid test also. ED Course:   Initial assessment performed. The patients presenting problems have been discussed, and they are in agreement with the care plan formulated and outlined with them. I have encouraged them to ask questions as they arise throughout their visit. ED Course as of May 14 1844   Fri May 14, 2021   1610 Patient with a transient alteration of awareness, patient was decreased responsiveness but awake and did follow commands.   Will obtain a head CT evaluate. No Focal findings noted to suggest stroke. [MF]   56 Spoke with Dr. Dann Akhtar hospitalist, recommend transfer for higher level care. [MF]   6298 Patient on plan for admission, patient is agreeable to be admitted to [facility]. Transfer center contacted    [MF]   1811 Dr Brenda Persaud accepts to ED [facility] ED    [MF]      ED Course User Index  [MF] Juan Carlos Lugo MD           6:07 PM    Pt presents shortness of breath, found to have bilateral bibasilar airspace disease, differentials pneumonia versus CHF, he was treated for both. Lactic acid was normal white cell counts normal he improved with oxygen. Appears to have a chronic respiratory acidosis with compensatory metabolic alkalosis. Will transfer patient for higher level care further evaluation and treatment. I have reviewed all pertinent and currently available diagnostic test results for this visit including, but not limited to, labs, xrays, and EKGs. I have reviewed all pertinent and currently available medical records. My plan of care and further evaluation and/or disposition is based on these results, as well as the initial, and subsequent, history and physical exam, as well as any additional complaints during the visit. Giorgio Linares MD          Procedures      Critical Care Time:   CRITICAL CARE NOTE :    6:07 PM      IMPENDING DETERIORATION -Airway, Respiratory and Cardiovascular  ASSOCIATED RISK FACTORS - Metabolic changes  MANAGEMENT- Bedside Assessment, Supervision of Care and Transfer  INTERPRETATION -  Xrays, CT Scan, Blood Gases and ECG  INTERVENTIONS - Metobolic interventions and oxygen  CASE REVIEW - Nursing, Family and ED MD  TREATMENT RESPONSE -Improved  PERFORMED BY - Self    NOTES   :  I have spent 35 minutes of critical care time involved in lab review, consultations with specialist, family decision- making, bedside attention and documentation.  This time excludes time spent in any separate billed procedures. During this entire length of time I was immediately available to the patient . Rendall Kayser, MD      Disposition:    Transferred to Another Facility        Diagnosis     Clinical Impression:   1. Acute respiratory failure with hypoxemia (HCC)    2. Pneumonia of both lower lobes due to infectious organism    3. Chronic atrial fibrillation (HCC)        Attestations: Rendall Kayser, MD    Please note that this dictation was completed with [id number], the computer voice recognition software. Quite often unanticipated grammatical, syntax, homophones, and other interpretive errors are inadvertently transcribed by the computer software. Please disregard these errors. Please excuse any errors that have escaped final proofreading. Thank you. FAMILY HISTORY:  Father  Still living? Unknown  Family history of lung cancer, Age at diagnosis: Age Unknown

## 2022-03-18 PROBLEM — J18.9 CAP (COMMUNITY ACQUIRED PNEUMONIA): Status: ACTIVE | Noted: 2021-05-14

## 2022-03-18 PROBLEM — I50.9 CHF EXACERBATION (HCC): Status: ACTIVE | Noted: 2021-05-14

## 2022-03-18 PROBLEM — K57.30 DIVERTICULOSIS OF LARGE INTESTINE WITHOUT HEMORRHAGE: Status: ACTIVE | Noted: 2017-08-24

## 2022-03-18 PROBLEM — J96.01 ACUTE HYPOXEMIC RESPIRATORY FAILURE (HCC): Status: ACTIVE | Noted: 2021-05-14

## 2022-03-18 PROBLEM — R53.81 PHYSICAL DEBILITY: Status: ACTIVE | Noted: 2021-05-19

## 2022-03-18 PROBLEM — K80.20 GALLSTONES: Status: ACTIVE | Noted: 2017-08-24

## 2022-03-19 PROBLEM — Z96.651 HISTORY OF TOTAL RIGHT KNEE REPLACEMENT: Status: ACTIVE | Noted: 2017-08-24

## 2022-03-19 PROBLEM — R63.0 POOR APPETITE: Status: ACTIVE | Noted: 2021-05-19

## 2022-03-19 PROBLEM — E44.0 MODERATE PROTEIN-CALORIE MALNUTRITION (HCC): Status: ACTIVE | Noted: 2021-05-19

## 2022-03-19 PROBLEM — I48.0 PAF (PAROXYSMAL ATRIAL FIBRILLATION) (HCC): Status: ACTIVE | Noted: 2021-05-15

## 2022-03-19 PROBLEM — E87.1 HYPONATREMIA: Status: ACTIVE | Noted: 2021-02-23

## 2022-03-19 PROBLEM — Z86.79 HISTORY OF ATRIAL FIBRILLATION: Status: ACTIVE | Noted: 2017-08-24

## 2022-03-20 PROBLEM — Z71.89 GOALS OF CARE, COUNSELING/DISCUSSION: Status: ACTIVE | Noted: 2021-05-19

## 2023-01-17 NOTE — PROGRESS NOTES
Interdisciplinary team rounds were held 5/19/2021  with the following team members:Care Management, Diabetes Treatment Specialist, Nursing, Nutrition, Pharmacy, Physical Therapy and Physician. Plan of care discussed. Goal: Palliative consult. See MD orders and progress notes for further  interventions and desired outcomes. [FreeTextEntry1] : Discussed diagnosis and treatment plan including PT.\par Limit sitting.  Can get proper pillow for back in car\par Continue Pilates\par Get in shape.\par Discussed leti if not better.\par Take mobic prn\par \par f/u 1 month

## 2023-10-02 NOTE — ED NOTES
I agree. I will call him tomorrow and try to see if I can convince him to make an appt in [place].  O2 removed at 1505 for ABG , after ABG drawn spO2 down to 82 % and patient placed back on O2 at 3 lpm. Provider notified.

## 2024-03-26 NOTE — PROGRESS NOTES
Patient: Karthikeyan Riggs    Procedure Summary       Date: 03/26/24 Room / Location:  [facility]    Anesthesia Start: 1142 Anesthesia Stop: 1220    Procedure: COLONOSCOPY Diagnosis:       Screen for colon cancer      (Screen for colon cancer [Z12.11])    Surgeons: Damaso Vuong MD Provider: Johnathan Delong MD    Anesthesia Type: general ASA Status: 3            Anesthesia Type: general    Vitals  No vitals data found for the desired time range.  BP 97/69  T 97F  RR 14  SPO2 94% 2L NC  HR 85 PACED/NSR        Post Anesthesia Care and Evaluation    Patient location during evaluation: PACU  Patient participation: complete - patient participated  Level of consciousness: awake and alert  Pain management: adequate    Airway patency: patent  Anesthetic complications: No anesthetic complications  PONV Status: none  Cardiovascular status: hemodynamically stable and acceptable  Respiratory status: nonlabored ventilation, acceptable and nasal cannula  Hydration status: acceptable         Problem: Mobility Impaired (Adult and Pediatric)  Goal: *Acute Goals and Plan of Care (Insert Text)  Description: FUNCTIONAL STATUS PRIOR TO ADMISSION: Patient was independent and active without use of DME.    HOME SUPPORT PRIOR TO ADMISSION: The patient lived with spouse. Physical Therapy Goals  Initiated 5/17/2021  1. Patient will move from supine to sit and sit to supine  in bed with modified independence within 7 day(s). 2.  Patient will transfer from bed to chair and chair to bed with minimal assistance/contact guard assist using the least restrictive device within 7 day(s). 3.  Patient will perform sit to stand with minimal assistance/contact guard assist within 7 day(s). 4.  Patient will ambulate with minimal assistance/contact guard assist for 50 feet with the least restrictive device within 7 day(s). 5.  Patient will ascend/descend 4 stairs with 1 handrail(s) with minimal assistance/contact guard assist within 7 day(s). Outcome: Progressing Towards Goal   PHYSICAL THERAPY EVALUATION  Patient: Swapnil Gonzalez (77 y.o. male)  Date: 5/17/2021  Primary Diagnosis: Acute hypoxemic respiratory failure ([address] [J96.01]  CHF exacerbation ([facility]) [I50.9]  CAP (community acquired pneumonia) [J18.9]        Precautions:   Fall    ASSESSMENT  Based on the objective data described below, the patient presents with decreased independence with functional mobility, decreased balance, and need for supplemental O2 S/P admission for acute respiratory failure, CHF exacerbation and CAP. He demonstrates the need for increased time to complete all functional mobility. Patient demonstrates stable BP from supine to sit but orthostatic drop is noted from sitting to standing. He demonstrates the need for Mod Ax2 for stand pivot with use of RW to bedside commode and then to chair. Patient requires increased assistance to achieve and maintain standing balance.  With pressure drop patient demonstrates buckling of the knees and lack of coordination even with limited mobility requiring increased assistance to prevent a fall. Once seated BP normalizes. Patient denies symptoms of dizziness or feeling lightheaded. Patient is maintained on 2L/min O2 via nasal cannula throughout session. Spot checking O2 thorughout limited mobility patient maintain O2 sats 90% and above. Current Level of Function Impacting Discharge (mobility/balance): Mod- max Ax2 for stand pivot with use of RW. Functional Outcome Measure: The patient scored 15/100 on the Barthel outcome measure. Other factors to consider for discharge: medical stability, increased need for assistance, PLOF- independent but admits to recently looking to get additional help, lives with elderly spouse, increased risk for falls      Patient will benefit from skilled therapy intervention to address the above noted impairments. PLAN :  Recommendations and Planned Interventions: bed mobility training, transfer training, gait training, therapeutic exercises, neuromuscular re-education, edema management/control, patient and family training/education, and therapeutic activities      Frequency/Duration: Patient will be followed by physical therapy:  5 times a week to address goals. Recommendation for discharge: (in order for the patient to meet his/her long term goals)  Therapy up to 5 days/week in SNF setting    This discharge recommendation:  Has not yet been discussed the attending provider and/or case management    IF patient discharges home will need the following DME: to be determined (TBD)         SUBJECTIVE:   Patient stated I just want to get back to how I was.     OBJECTIVE DATA SUMMARY:   HISTORY:    Past Medical History:   Diagnosis Date    Afib ([facility] [address]     Dilated aortic root ([facility] [address]     Diverticulosis     Dyslipidemia     GERD (gastroesophageal reflux disease)     HTN (hypertension)     Hx of colonic polyps     Hyponatremia 2/23/2021    Mild valvular heart disease Prostate cancer ([facility] [address]     s/p XRT (proton beam)     Past Surgical History:   Procedure Laterality Date    HX ORTHOPAEDIC      HX UROLOGICAL         Personal factors and/or comorbidities impacting plan of care: please see above    Home Situation  Home Environment: Private residence  One/Two Story Residence: One story  Living Alone: No  Support Systems: Family member(s), Friends \ neighbors, [place] Hugh / chava community  Patient Expects to be Discharged to[de-identified] Unknown  Current DME Used/Available at Home: Shower chair, Walker, rolling, Cane, straight    EXAMINATION/PRESENTATION/DECISION MAKING:   Critical Behavior:  Neurologic State: Alert(moderate difficulty with word-finding)  Orientation Level: Oriented X4  Cognition: Follows commands     Hearing: Auditory  Auditory Impairment: None  Skin:    Edema:   Range Of Motion:  AROM: Generally decreased, functional           PROM: Generally decreased, functional           Strength:    Strength: Generally decreased, functional                    Tone & Sensation:                                  Coordination:  Coordination: Generally decreased, functional  Vision:   Corrective Lenses: Glasses  Functional Mobility:  Bed Mobility:     Supine to Sit: Minimum assistance;Bed Modified     Scooting: Minimum assistance  Transfers:  Sit to Stand: Moderate assistance  Stand to Sit: Moderate assistance  Stand Pivot Transfers: Moderate assistance;Assist x2     Bed to Chair: Moderate assistance;Assist x2              Balance:   Sitting: Impaired; With support  Sitting - Static: Good (unsupported)  Sitting - Dynamic: Fair (occasional)  Standing: Impaired; With support  Standing - Static: Constant support;Fair;Poor  Standing - Dynamic : Poor  Ambulation/Gait Training:                                                         Stairs:               Therapeutic Exercises:       Functional Measure:  Barthel Index:    Bathin  Bladder: 0  Bowels: 0  Groomin  Dressin  Feedin  Mobility: 0  Stairs: 0  Toilet Use: 5  Transfer (Bed to Chair and Back): 5  Total: 15/100       The Barthel ADL Index: Guidelines  1. The index should be used as a record of what a patient does, not as a record of what a patient could do. 2. The main aim is to establish degree of independence from any help, physical or verbal, however minor and for whatever reason. 3. The need for supervision renders the patient not independent. 4. A patient's performance should be established using the best available evidence. Asking the patient, friends/relatives and nurses are the usual sources, but direct observation and common sense are also important. However direct testing is not needed. 5. Usually the patient's performance over the preceding 24-48 hours is important, but occasionally longer periods will be relevant. 6. Middle categories imply that the patient supplies over 50 per cent of the effort. 7. Use of aids to be independent is allowed. Shasta Faust., Barthel, D.W. ([id number]). Functional evaluation: the Barthel Index. [facility] (14)2. JUANITA CastellanosF, Carol Plush., [facility], [address] [place] [address] (1999). Measuring the change indisability after inpatient rehabilitation; comparison of the responsiveness of the Barthel Index and Functional [place] Measure. Journal of Neurology, Neurosurgery, and Psychiatry, 66(4), 476-570. GAYLE Baugh.ZACHARY, MACIE Mack, & Violet Rosen M.A. (2004.) Assessment of post-stroke quality of life in cost-effectiveness studies: The usefulness of the Barthel Index and the EuroQoL-5D.  Quality of Life Research, 15, 975-23            Physical Therapy Evaluation Charge Determination   History Examination Presentation Decision-Making   HIGH Complexity :3+ comorbidities / personal factors will impact the outcome/ POC  LOW Complexity : 1-2 Standardized tests and measures addressing body structure, function, activity limitation and / or participation in recreation  MEDIUM Complexity : Evolving with changing characteristics  Other outcome measures barthel  HIGH       Based on the above components, the patient evaluation is determined to be of the following complexity level: HIGH     Pain Rating:      Activity Tolerance:   Fair, Poor, desaturates with exertion and requires oxygen, requires rest breaks, and signs and symptoms of orthostatic hypotension    After treatment patient left in no apparent distress:   Sitting in chair, Call bell within reach, and Bed / chair alarm activated    COMMUNICATION/EDUCATION:   The patients plan of care was discussed with: Occupational therapist and Registered nurse. Fall prevention education was provided and the patient/caregiver indicated understanding., Patient/family have participated as able in goal setting and plan of care. , and Patient/family agree to work toward stated goals and plan of care.     Thank you for this referral.  Evelyn Stewart, PT   Time Calculation: 43 mins